# Patient Record
Sex: MALE | Race: WHITE | NOT HISPANIC OR LATINO | Employment: FULL TIME | URBAN - NONMETROPOLITAN AREA
[De-identification: names, ages, dates, MRNs, and addresses within clinical notes are randomized per-mention and may not be internally consistent; named-entity substitution may affect disease eponyms.]

---

## 2017-05-12 ENCOUNTER — OFFICE VISIT (OUTPATIENT)
Dept: FAMILY MEDICINE | Facility: OTHER | Age: 52
End: 2017-05-12
Attending: FAMILY MEDICINE
Payer: COMMERCIAL

## 2017-05-12 VITALS
BODY MASS INDEX: 33.37 KG/M2 | HEART RATE: 64 BPM | WEIGHT: 260 LBS | SYSTOLIC BLOOD PRESSURE: 103 MMHG | TEMPERATURE: 97.1 F | DIASTOLIC BLOOD PRESSURE: 70 MMHG | HEIGHT: 74 IN

## 2017-05-12 DIAGNOSIS — R97.20 ELEVATED PROSTATE SPECIFIC ANTIGEN (PSA): ICD-10-CM

## 2017-05-12 DIAGNOSIS — E78.1 HYPERTRIGLYCERIDEMIA: ICD-10-CM

## 2017-05-12 DIAGNOSIS — Z12.11 SPECIAL SCREENING FOR MALIGNANT NEOPLASMS, COLON: ICD-10-CM

## 2017-05-12 DIAGNOSIS — Z00.00 ROUTINE GENERAL MEDICAL EXAMINATION AT A HEALTH CARE FACILITY: Primary | ICD-10-CM

## 2017-05-12 DIAGNOSIS — K42.9 UMBILICAL HERNIA WITHOUT OBSTRUCTION AND WITHOUT GANGRENE: ICD-10-CM

## 2017-05-12 DIAGNOSIS — Z12.5 SCREENING FOR PROSTATE CANCER: ICD-10-CM

## 2017-05-12 DIAGNOSIS — I83.93 VARICOSE VEINS OF BOTH LOWER EXTREMITIES: ICD-10-CM

## 2017-05-12 LAB
ALBUMIN SERPL-MCNC: 4 G/DL (ref 3.4–5)
ALP SERPL-CCNC: 74 U/L (ref 40–150)
ALT SERPL W P-5'-P-CCNC: 30 U/L (ref 0–70)
ANION GAP SERPL CALCULATED.3IONS-SCNC: 6 MMOL/L (ref 3–14)
AST SERPL W P-5'-P-CCNC: 22 U/L (ref 0–45)
BASOPHILS # BLD AUTO: 0 10E9/L (ref 0–0.2)
BASOPHILS NFR BLD AUTO: 0.4 %
BILIRUB SERPL-MCNC: 0.8 MG/DL (ref 0.2–1.3)
BUN SERPL-MCNC: 12 MG/DL (ref 7–30)
CALCIUM SERPL-MCNC: 8.3 MG/DL (ref 8.5–10.1)
CHLORIDE SERPL-SCNC: 108 MMOL/L (ref 94–109)
CHOLEST SERPL-MCNC: 156 MG/DL
CO2 SERPL-SCNC: 29 MMOL/L (ref 20–32)
CREAT SERPL-MCNC: 0.98 MG/DL (ref 0.66–1.25)
DIFFERENTIAL METHOD BLD: NORMAL
EOSINOPHIL # BLD AUTO: 0.3 10E9/L (ref 0–0.7)
EOSINOPHIL NFR BLD AUTO: 4.8 %
ERYTHROCYTE [DISTWIDTH] IN BLOOD BY AUTOMATED COUNT: 12.7 % (ref 10–15)
GFR SERPL CREATININE-BSD FRML MDRD: 81 ML/MIN/1.7M2
GLUCOSE SERPL-MCNC: 91 MG/DL (ref 70–99)
HCT VFR BLD AUTO: 43.9 % (ref 40–53)
HDLC SERPL-MCNC: 36 MG/DL
HGB BLD-MCNC: 15.5 G/DL (ref 13.3–17.7)
IMM GRANULOCYTES # BLD: 0 10E9/L (ref 0–0.4)
IMM GRANULOCYTES NFR BLD: 0.4 %
LDLC SERPL CALC-MCNC: 92 MG/DL
LYMPHOCYTES # BLD AUTO: 1.5 10E9/L (ref 0.8–5.3)
LYMPHOCYTES NFR BLD AUTO: 29.2 %
MCH RBC QN AUTO: 30.4 PG (ref 26.5–33)
MCHC RBC AUTO-ENTMCNC: 35.3 G/DL (ref 31.5–36.5)
MCV RBC AUTO: 86 FL (ref 78–100)
MONOCYTES # BLD AUTO: 0.6 10E9/L (ref 0–1.3)
MONOCYTES NFR BLD AUTO: 11.3 %
NEUTROPHILS # BLD AUTO: 2.8 10E9/L (ref 1.6–8.3)
NEUTROPHILS NFR BLD AUTO: 53.9 %
NONHDLC SERPL-MCNC: 120 MG/DL
NRBC # BLD AUTO: 0 10*3/UL
NRBC BLD AUTO-RTO: 0 /100
PLATELET # BLD AUTO: 183 10E9/L (ref 150–450)
POTASSIUM SERPL-SCNC: 4 MMOL/L (ref 3.4–5.3)
PROT SERPL-MCNC: 7.1 G/DL (ref 6.8–8.8)
PSA SERPL-ACNC: 5.08 UG/L (ref 0–4)
RBC # BLD AUTO: 5.1 10E12/L (ref 4.4–5.9)
SODIUM SERPL-SCNC: 143 MMOL/L (ref 133–144)
TRIGL SERPL-MCNC: 140 MG/DL
WBC # BLD AUTO: 5.2 10E9/L (ref 4–11)

## 2017-05-12 PROCEDURE — 99396 PREV VISIT EST AGE 40-64: CPT | Performed by: FAMILY MEDICINE

## 2017-05-12 PROCEDURE — 85025 COMPLETE CBC W/AUTO DIFF WBC: CPT | Performed by: FAMILY MEDICINE

## 2017-05-12 PROCEDURE — 80061 LIPID PANEL: CPT | Performed by: FAMILY MEDICINE

## 2017-05-12 PROCEDURE — 93000 ELECTROCARDIOGRAM COMPLETE: CPT | Performed by: INTERNAL MEDICINE

## 2017-05-12 PROCEDURE — 84153 ASSAY OF PSA TOTAL: CPT | Performed by: FAMILY MEDICINE

## 2017-05-12 PROCEDURE — 80053 COMPREHEN METABOLIC PANEL: CPT | Performed by: FAMILY MEDICINE

## 2017-05-12 PROCEDURE — 36415 COLL VENOUS BLD VENIPUNCTURE: CPT | Performed by: FAMILY MEDICINE

## 2017-05-12 ASSESSMENT — ANXIETY QUESTIONNAIRES
2. NOT BEING ABLE TO STOP OR CONTROL WORRYING: MORE THAN HALF THE DAYS
5. BEING SO RESTLESS THAT IT IS HARD TO SIT STILL: NOT AT ALL
IF YOU CHECKED OFF ANY PROBLEMS ON THIS QUESTIONNAIRE, HOW DIFFICULT HAVE THESE PROBLEMS MADE IT FOR YOU TO DO YOUR WORK, TAKE CARE OF THINGS AT HOME, OR GET ALONG WITH OTHER PEOPLE: SOMEWHAT DIFFICULT
GAD7 TOTAL SCORE: 8
7. FEELING AFRAID AS IF SOMETHING AWFUL MIGHT HAPPEN: SEVERAL DAYS
3. WORRYING TOO MUCH ABOUT DIFFERENT THINGS: MORE THAN HALF THE DAYS
6. BECOMING EASILY ANNOYED OR IRRITABLE: SEVERAL DAYS
1. FEELING NERVOUS, ANXIOUS, OR ON EDGE: SEVERAL DAYS

## 2017-05-12 ASSESSMENT — PAIN SCALES - GENERAL: PAINLEVEL: NO PAIN (0)

## 2017-05-12 ASSESSMENT — PATIENT HEALTH QUESTIONNAIRE - PHQ9: 5. POOR APPETITE OR OVEREATING: SEVERAL DAYS

## 2017-05-12 NOTE — LETTER
May 12, 2017      Rajendra SWAPNA Gagnon  [address]   [place] [address]            To Whom this may concern,    Rajendra had a clinic appointment 5/12/17.       Sincerely,    Dr. Mckeon

## 2017-05-12 NOTE — PATIENT INSTRUCTIONS
Preventive Health Recommendations  Male Ages 50   64    Yearly exam:             See your health care provider every year in order to  o   Review health changes.   o   Discuss preventive care.    o   Review your medicines if your doctor has prescribed any.     Have a cholesterol test every 5 years, or more frequently if you are at risk for high cholesterol/heart disease.     Have a diabetes test (fasting glucose) every three years. If you are at risk for diabetes, you should have this test more often.     Have a colonoscopy at age 50, or have a yearly FIT test (stool test). These exams will check for colon cancer.      Talk with your health care provider about whether or not a prostate cancer screening test (PSA) is right for you.    You should be tested each year for STDs (sexually transmitted diseases), if you re at risk.     Shots: Get a flu shot each year. Get a tetanus shot every 10 years.     Nutrition:    Eat at least 5 servings of fruits and vegetables daily.     Eat whole-grain bread, whole-wheat pasta and brown rice instead of white grains and rice.     Talk to your provider about Calcium and Vitamin D.     Lifestyle    Exercise for at least 150 minutes a week (30 minutes a day, 5 days a week). This will help you control your weight and prevent disease.     Limit alcohol to one drink per day.     No smoking.     Wear sunscreen to prevent skin cancer.     See your dentist every six months for an exam and cleaning.     See your eye doctor every 1 to 2 years.     Ref Range & Units 7:54 AM   1yr ago   2yr ago   3yr ago         Sodium 133 - 144 mmol/L 143 139 138 133 (L)R      Potassium 3.4 - 5.3 mmol/L 4.0 3.8 4.1 4.0R      Chloride 94 - 109 mmol/L 108 105 107 101R      Carbon Dioxide 20 - 32 mmol/L 29 29 24 28R      Anion Gap 3 - 14 mmol/L 6 5 7R 4.2 (L)R      Glucose 70 - 99 mg/dL 91 85 86CM 101 (H)R     Comments: Fasting specimen      Urea Nitrogen 7 - 30 mg/dL 12 18 13CM 15R      Creatinine 0.66 - 1.25  mg/dL 0.98 1.02 0.88 1.34 (H)R      GFR Estimate >60 mL/min/1.7m2 81 78CM >90  Non  Amer... 57 (L)     Comments: Non  GFR Calc      GFR Estimate If Black >60 mL/min/1.7m2 >90  ... >90  ... >90  ... 69     >90    GFR Calc         Calcium 8.5 - 10.1 mg/dL 8.3 (L) 8.3 (L) 8.6CM 8.5R      Bilirubin Total 0.2 - 1.3 mg/dL 0.8 0.9 1.0       Albumin 3.4 - 5.0 g/dL 4.0 3.9 4.3R       Protein Total 6.8 - 8.8 g/dL 7.1 7.2 7.4       Alkaline Phosphatase 40 - 150 U/L 74 67 67       ALT 0 - 70 U/L 30 35 33       AST 0 - 45 U/L 22 26 26      Resulting [facility]          Specimen Collected: 05/12/17  7:54 AM Last Resulted: 05/12/17  8:21 AM                CM=Additional comments  R=Reference range differs from displayed range                     Prostate spec antigen screen   Status:  Final result   Visible to patient:  No (Not Released) Dx:  Screening for prostate cancer Order: [id number]             Ref Range & Units 7:54 AM   3yr ago        PSA 0 - 4 ug/L 5.08 (H) 3.50     Comments: Assay Method:  Chemiluminescence using Siemens Vista analyzer     Resulting [facility]          Specimen Collected: 05/12/17  7:54 AM Last Resulted: 05/12/17  8:21 AM                                Lipid Profile   Status:  Final result   Visible to patient:  No (Not Released) Dx:  Hypertriglyceridemia Order: [id number]             Ref Range & Units 7:54 AM   1yr ago   2yr ago   3yr ago        Cholesterol <200 mg/dL 156 176 160CM 173R, CM      Triglycerides <150 mg/dL 140 233 (H) (H)R 166 (H)R     Comments: Fasting specimen      HDL Cholesterol >39 mg/dL 36 (L) 34 (L) 35 (L)R 29 (L)R      LDL Cholesterol Calculated <100 mg/dL 92 95CM 94R, CM 111R, CM     Comments: Desirable:       <100 mg/dl      Non HDL Cholesterol <130 mg/dL 120 142 (H)CM       Resulting [facility]          Specimen Collected: 05/12/17  7:54 AM Last Resulted: 05/12/17   8:21 AM                CM=Additional comments  R=Reference range differs from displayed range                      CBC with platelets differential   Status:  Final result   Visible to patient:  No (Not Released) Dx:  Special screening for malignant neopl... Order: [id number]             Ref Range & Units 7:54 AM  (5/12/17) 1yr ago  (5/11/16) 2yr ago  (8/8/14) 3yr ago  (2/10/14) 3yr ago  (8/22/13)      WBC 4.0 - 11.0 10e9/L 5.2 6.5 6.0 8.0 6.1      RBC Count 4.4 - 5.9 10e12/L 5.10 5.10 5.02 5.50 4.98      Hemoglobin 13.3 - 17.7 g/dL 15.5 15.5 15.3 16.6 15.1      Hematocrit 40.0 - 53.0 % 43.9 44.8 43.5 47.2 43.2      MCV 78 - 100 fl 86 88 87 86 87      MCH 26.5 - 33.0 pg 30.4 30.4 30.5 30.2 30.3      MCHC 31.5 - 36.5 g/dL 35.3 34.6 35.2 35.2 35.0      RDW 10.0 - 15.0 % 12.7 13.0 13.7 13.4 13.4      Platelet Count 150 - 450 10e9/L 183 172 165 196 157      Diff Method  Automated Method Automated Method Automated Method Automated Method Automated Method      % Neutrophils % 53.9 54.9 59.1 61.3 68.4      % Lymphocytes % 29.2 31.2 29.0 27.7 21.2      % Monocytes % 11.3 9.0 8.7 7.4 8.5      % Eosinophils % 4.8 4.0 2.7 2.9 1.3      % Basophils % 0.4 0.6 0.3 0.4 0.3      % Immature Granulocytes % 0.4 0.3 0.2 0.3 0.3      Nucleated RBCs 0 /100 0 0         Absolute Neutrophil 1.6 - 8.3 10e9/L 2.8 3.6 3.5 4.9 4.2      Absolute Lymphocytes 0.8 - 5.3 10e9/L 1.5 2.0 1.7 2.2 1.3      Absolute Monocytes 0.0 - 1.3 10e9/L 0.6 0.6 0.5 0.6 0.5      Absolute Eosinophils 0.0 - 0.7 10e9/L 0.3 0.3 0.2 0.2 0.1      Absolute Basophils 0.0 - 0.2 10e9/L 0.0 0.0 0.0 0.0 0.0      Abs Immature Granulocytes 0 - 0.4 10e9/L 0.0 0.0 0.0 0.0 0.0      Absolute Nucleated RBC  0.0 0.0        Resulting Agency  [facility] HI          Specimen Collected: 05/12/17  7:54 AM Last Resulted: 05/12/17  8:01 AM                                 Status of Other Orders        Order    Lab Status Result Date Provider Status       EKG 12-lead complete w/read - Clinics No  Result  Ordered             Colorectal Cancer Screening  Colorectal cancer (cancer in the colon or rectum) is a leading cause of cancer deaths in the [place]. But it doesn t have to be. When this cancer is found and removed early, the chances of a full recovery are very good. Because colorectal cancer rarely causes symptoms in its early stages, screening for the disease is important. It s even more crucial if you have risk factors for the disease. Learn more about colorectal cancer and its risk factors. Then talk to your healthcare provider about being screened. You could be saving your own life.  Risk factors for colorectal cancer  Your risk of having colorectal cancer increases if you:    Are 50 years of age or older    Have a family history or personal history of colorectal cancer or polyps    Have a personal history of type 2 diabetes, Crohn s disease, or ulcerative colitis    Have an inherited genetic syndrome like Starr syndrome (also known as HNPCC) or familial adenomatous polyposis (FAP)    Are very overweight    Are not physically active    Smoke    Drink a lot of alcohol    Eat a lot of red or processed meat  The colon and rectum        A ESTIVEN can detect a growth in the rectum or anus.      Waste from food you eat enters the colon from the small intestine. As it travels through the colon, the waste (stool) loses water and becomes more solid. Intestinal muscles push it toward the sigmoid the last section of the colon. Stool then moves into the rectum, where it s stored until it s ready to leave the body during a bowel movement.  How cancer develops  Polyps are growths that form on the inner lining of the colon or rectum. Most are benign, which means they aren t cancerous. But over time, some polyps can become malignant (cancerous). This happens when cells in these polyps begin growing abnormally. In time, malignant cells invade more and more of the colon and rectum. The cancer may also spread to nearby  organs or lymph nodes or to other parts of the body. Finding and removing polyps can help prevent cancer from ever forming.  Your screening  Screening means looking for a medical problem before you have symptoms. During screening for colorectal cancer, your healthcare provider will ask about your medical history, examine you, and do one or more tests.  History and exam  The history and exam involve the following:    Medical history. Your healthcare provider will ask about your medical history. Mention if a family member has had colon cancer or polyps. Also mention any health problems you have had in the past.    Digital rectal exam (ESTIVEN). During a ESTIVEN, the healthcare provider inserts a lubricated gloved finger into the rectum. The test is painless and takes less than a minute. Healthcare providers agree that this test alone is not enough to screen for colorectal cancer.  Screening test choices  Fecal occult blood test (FOBT) or fecal immunochemical test (FIT)  These tests check for occult blood in stool (blood you can t see). Hidden blood may be a sign of colon polyps or cancer. A small sample of stool is tested for blood in a laboratory. Most often, you collect this sample at home using a kit your healthcare provider gives you. Follow the instructions carefully for using this kit. You might need to avoid certain foods and medicines before the test, as directed.  Barium enema with contrast (double-contrast barium enema)  This test uses X-rays to provide images of the entire colon and rectum. The day before this test, you will need to do a bowel prep to clean out the colon and rectum. A bowel prep is a liquid diet plus strong laxatives or enemas. You will be awake for the test, but you may be given medicine to help you relax. At the start of the test, a radiologist (a healthcare provider who specializes in imaging tests) places a soft tube into the rectum. The tube is used to fill the colon with a contrast liquid  (barium) and air. This can be uncomfortable for some people. The liquid helps the colon show up clearly on the X-rays. Because the test uses X-rays, it exposes you to a small amount of radiation.  Virtual colonoscopy  This exam is also called a CT colonography. It uses a series of X-ray photographs to create a 3-D view of the colon and rectum. The day before the test, you will need to do a bowel prep to clean out your colon. Your healthcare provider will give you instructions on how to do this. During the procedure, you will lie on a table that is part of a special X-ray machine called a CT scanner. A small tube will be placed into your rectum to fill the colon and rectum with air. This can be uncomfortable for some people. Then, the table will move into the machine and pictures will be taken of your colon and rectum. A computer will combine these photos to create a 3-D picture. Because the test uses X-rays, it exposes you to a small amount of radiation.        Scope exams  Here are two types of scope exams:    Colonoscopy. This test can be used to find and remove polyps anywhere in the colon or rectum. The day before the test, you will do a bowel prep. This is a liquid diet plus a strong laxative solution or an enema. The bowel prep will cleanse your colon. You will be given instructions for this. Just before the test, you are given a medicine to make you sleepy. Then, a long, flexible, lighted tube called a colonoscope is gently inserted into the rectum and guided through the entire colon. Images of the colon are viewed on a video screen. Any polyps that are found are removed and sent to a lab for testing. If a polyp can t be removed, a sample of tissue is taken and the polyp might be removed later during surgery. You will need to bring someone with you to drive you home after this test.     Sigmoidoscopy. This test is similar to colonoscopy, but focuses only on the sigmoid colon and rectum. As with colonoscopy,  bowel prep must be done the day before this test. It might not need to be as complete as the bowel prep for a colonoscopy. You are awake during the procedure, but you may be given medicine to help you relax. During the test, the healthcare provider guides a thin, flexible, lighted tube called a sigmoidoscope through your rectum and lower colon. The images are displayed on a video screen. Polyps are removed, if possible, and sent to a lab for testing.  Colonoscopy is the only screening test that lets your healthcare provider see the entire colon and rectum. This test also lets your healthcare provider remove any pieces of tissue that need to be looked at by a lab. If something suspicious is found using any other tests, you will likely need a colonoscopy.  When to call your healthcare provider after a test  Call your healthcare provider if you have any of the following after any screening test:    Bleeding    Fever over 101 F (38.8 C)    Abdominal pain    Vomiting     9419-6751 [employer]. [address] [place] [address]. All rights reserved. This information is not intended as a substitute for professional medical care. Always follow your healthcare professional's instructions.        Having Hernia Surgery: Patch Repair  Surgery treats a hernia by repairing the weakness in the abdominal wall. An incision is made so the surgeon has a direct view of the hernia. The repair is then done through this incision (open surgery). To repair the defect, special mesh materials are used to patch the weak area and make a  tension-free repair.  Follow your doctor s advice on how to get ready for the procedure. You can usually go home the same day as your surgery. In some cases, though, you may need to stay in the hospital overnight.  Getting Ready for Surgery  Your doctor will talk with you about preparing for surgery. Follow all the instructions you re given and be sure to:     Tell your doctor about any  medications, supplements, or herbs you take. This includes both prescription and over-the-counter items.    Stop taking aspirin, ibuprofen, naproxen and other NSAIDs as directed.    Arrange for an adult family member or friend to give you a ride home after surgery.    Stop smoking. Smoking affects blood flow and can slow healing.    Gently wash the surgical area the night before surgery.    Don t eat or drink after midnight, the night before your surgery. You may need to take some medication with sips of water on the day of surgery. Talk with your doctor.     Repair in Front           Repair in Back           Combination Repair      The Day of Surgery  Arrive at the hospital or surgical center at your scheduled time. You ll be asked to change into a patient gown. You ll then be given an IV to provide fluids and medication. Shortly before surgery, an anesthesiologist will talk with you. He or she will explain the types of anesthesia used to prevent pain during surgery. You will have one or more of the following:    Monitored sedation to make you relaxed and sleepy.    Local anesthesia to numb the surgical site.    Regional anesthesia to numb specific areas of your body.    General anesthesia to let you sleep during surgery.  During the Surgery  Most hernias are treated using  tension-free  repairs. This is surgery that uses special mesh materials to repair the weak area. Unlike traditional repairs, the abdominal fascia (tissue around the muscle that gives strength to the abdominal wall) isn t sutured together. Instead, the mesh covers the weak area like a patch. This repairs the defect without  tension  on the muscles. It also makes it less likely to happen again. The mesh is made of strong, flexible plastic that stays in the body. Over time, nearby tissues grow into the mesh to strengthen the repair.  After Surgery  When the procedure is over, you ll be taken to the recovery area to rest. Your blood pressure and  heart rate will be monitored. You ll also have a bandage over the surgical site. To help reduce discomfort, you ll be given pain medications. You may also be given breathing exercises to keep your lungs clear. Later, you ll be asked to get up and walk. This helps prevent blood clots in the legs. You can go home when your doctor says you re ready.     Risks and Complications  Hernia surgery is safe, but does have risks including:    Bleeding    Infection    Anesthesia risks    Mesh complications    Inability to urinate     Bowel or bladder injury     Numbness or pain in the groin or leg    Risk the hernia will recur    Damage to the testicles or testicular function        6559-6191 [employer]. [address] [place] [address], [place] [address]. All rights reserved. This information is not intended as a substitute for professional medical care. Always follow your healthcare professional's instructions.        PSA Test  The prostate specific antigen (PSA) test is a blood test. It can help find prostate cancer. PSA is a substance in semen. It s made by the prostate. It is normal for some PSA to leak from the prostate into the bloodstream. But sometimes, more than a normal amount of PSA gets into the blood. The PSA test measures the amount of PSA in the blood. If the test shows high blood level of PSA, other tests are needed to help find the cause.     PSA is a simple blood test.   Possible causes of increased PSA  Several things can cause extra PSA to enter the blood, such as:    Prostate cancer    Prostate infection (prostatitis)    Enlarged prostate not due to cancer (benign prostatic hyperplasia, or BPH)    Prostate massage    Prostate biopsy  Why a PSA test is done  A PSA test can be done to check for prostate cancer. But the PSA test by itself can t tell for sure whether or not a man has prostate cancer. And not all health care providers agree if men should have PSA tests to screen for prostate cancer. The  American [employer] Society and many other organizations advise men talk with their health care provider about if prostate cancer screening is right for them. Talk with your health care provider about the PSA test beginning around age 50, or earlier if you re at higher risk.  A PSA test may also be done if a problem is found during a routine prostate exam. And it may be done if you have symptoms that suggest that you have a prostate problem. You may need a PSA if you have symptoms such as:    Needing to urinate more often    Waking often to urinate at night    Having to strain when urinating    Seeing blood in your urine    Having pain when urinating  How a PSA test is done  Before a PSA test, you may have a digital rectal exam (ESTIVEN) of the prostate. For this exam, the health care provider inserts a lubricated, gloved finger into the rectum to feel the prostate. Then you ll be sent to have your blood drawn for the PSA test. The test may be done in the health care provider s office. Or it may be at a lab, clinic, or hospital. Blood is taken from your arm and sent to a lab to be tested.  Getting your results  The time it takes to get your test results varies. Ask your health care provider when you can expect your results. You and your health care provider will discuss the results. A normal range for your PSA depends on a number of factors. These include your age and the size of your prostate. They also include your risk factors for cancer, your symptoms, and the results of any previous PSA tests you ve had. All of these things are taken into account when your PSA tests numbers are assessed.  If you're at higher risk for prostate cancer  If you are -American or have a family history of prostate cancer, talk with your health care provider about PSA tests by age 40 to 45.     2430-3740 [facility]. [address] [place] [address], [place] [address]. All rights reserved. This information is not intended as a  substitute for professional medical care. Always follow your healthcare professional's instructions.

## 2017-05-12 NOTE — MR AVS SNAPSHOT
After Visit Summary   5/12/2017    Rajendra Gagnon    MRN: [id number]           Patient Information     Date Of Birth          1965        Visit Information        Provider Department      5/12/2017 8:00 AM Bhupinder Mckeon MD [facility]        Today's Diagnoses     Routine general medical examination at a health care facility    -  1    Hypertriglyceridemia        Screening for prostate cancer        Special screening for malignant neoplasms, colon        Elevated prostate specific antigen (PSA)        Varicose veins of both lower extremities        Umbilical hernia without obstruction and without gangrene          Care Instructions      Preventive Health Recommendations  Male Ages 50 - 64    Yearly exam:             See your health care provider every year in order to  o   Review health changes.   o   Discuss preventive care.    o   Review your medicines if your doctor has prescribed any.     Have a cholesterol test every 5 years, or more frequently if you are at risk for high cholesterol/heart disease.     Have a diabetes test (fasting glucose) every three years. If you are at risk for diabetes, you should have this test more often.     Have a colonoscopy at age 50, or have a yearly FIT test (stool test). These exams will check for colon cancer.      Talk with your health care provider about whether or not a prostate cancer screening test (PSA) is right for you.    You should be tested each year for STDs (sexually transmitted diseases), if you re at risk.     Shots: Get a flu shot each year. Get a tetanus shot every 10 years.     Nutrition:    Eat at least 5 servings of fruits and vegetables daily.     Eat whole-grain bread, whole-wheat pasta and brown rice instead of white grains and rice.     Talk to your provider about Calcium and Vitamin D.     Lifestyle    Exercise for at least 150 minutes a week (30 minutes a day, 5 days a week). This will help you control your weight and  prevent disease.     Limit alcohol to one drink per day.     No smoking.     Wear sunscreen to prevent skin cancer.     See your dentist every six months for an exam and cleaning.     See your eye doctor every 1 to 2 years.     Ref Range & Units 7:54 AM   1yr ago   2yr ago   3yr ago         Sodium 133 - 144 mmol/L 143 139 138 133 (L)R      Potassium 3.4 - 5.3 mmol/L 4.0 3.8 4.1 4.0R      Chloride 94 - 109 mmol/L 108 105 107 101R      Carbon Dioxide 20 - 32 mmol/L 29 29 24 28R      Anion Gap 3 - 14 mmol/L 6 5 7R 4.2 (L)R      Glucose 70 - 99 mg/dL 91 85 86CM 101 (H)R     Comments: Fasting specimen      Urea Nitrogen 7 - 30 mg/dL 12 18 13CM 15R      Creatinine 0.66 - 1.25 mg/dL 0.98 1.02 0.88 1.34 (H)R      GFR Estimate >60 mL/min/1.7m2 81 78CM >90  Non  Amer... 57 (L)     Comments: Non  GFR Calc      GFR Estimate If Black >60 mL/min/1.7m2 >90  ... >90  ... >90  ... 69     >90    GFR Calc         Calcium 8.5 - 10.1 mg/dL 8.3 (L) 8.3 (L) 8.6CM 8.5R      Bilirubin Total 0.2 - 1.3 mg/dL 0.8 0.9 1.0       Albumin 3.4 - 5.0 g/dL 4.0 3.9 4.3R       Protein Total 6.8 - 8.8 g/dL 7.1 7.2 7.4       Alkaline Phosphatase 40 - 150 U/L 74 67 67       ALT 0 - 70 U/L 30 35 33       AST 0 - 45 U/L 22 26 26      Resulting Agency  [facility]          Specimen Collected: 05/12/17  7:54 AM Last Resulted: 05/12/17  8:21 AM                CM=Additional comments  R=Reference range differs from displayed range                     Prostate spec antigen screen   Status:  Final result   Visible to patient:  No (Not Released) Dx:  Screening for prostate cancer Order: [id number]             Ref Range & Units 7:54 AM   3yr ago        PSA 0 - 4 ug/L 5.08 (H) 3.50     Comments: Assay Method:  Chemiluminescence using Siemens Vista analyzer     Resulting Agency  [facility]          Specimen Collected: 05/12/17  7:54 AM Last Resulted: 05/12/17  8:21 AM                                 Lipid Profile   Status:  Final result   Visible to patient:  No (Not Released) Dx:  Hypertriglyceridemia Order: [id number]             Ref Range & Units 7:54 AM   1yr ago   2yr ago   3yr ago        Cholesterol <200 mg/dL 156 176 160CM 173R, CM      Triglycerides <150 mg/dL 140 233 (H) (H)R 166 (H)R     Comments: Fasting specimen      HDL Cholesterol >39 mg/dL 36 (L) 34 (L) 35 (L)R 29 (L)R      LDL Cholesterol Calculated <100 mg/dL 92 95CM 94R, CM 111R, CM     Comments: Desirable:       <100 mg/dl      Non HDL Cholesterol <130 mg/dL 120 142 (H)CM       Resulting Agency  [facility] HI HI HI          Specimen Collected: 05/12/17  7:54 AM Last Resulted: 05/12/17  8:21 AM                CM=Additional comments  R=Reference range differs from displayed range                      CBC with platelets differential   Status:  Final result   Visible to patient:  No (Not Released) Dx:  Special screening for malignant neopl... Order: [id number]             Ref Range & Units 7:54 AM  (5/12/17) 1yr ago  (5/11/16) 2yr ago  (8/8/14) 3yr ago  (2/10/14) 3yr ago  (8/22/13)      WBC 4.0 - 11.0 10e9/L 5.2 6.5 6.0 8.0 6.1      RBC Count 4.4 - 5.9 10e12/L 5.10 5.10 5.02 5.50 4.98      Hemoglobin 13.3 - 17.7 g/dL 15.5 15.5 15.3 16.6 15.1      Hematocrit 40.0 - 53.0 % 43.9 44.8 43.5 47.2 43.2      MCV 78 - 100 fl 86 88 87 86 87      MCH 26.5 - 33.0 pg 30.4 30.4 30.5 30.2 30.3      MCHC 31.5 - 36.5 g/dL 35.3 34.6 35.2 35.2 35.0      RDW 10.0 - 15.0 % 12.7 13.0 13.7 13.4 13.4      Platelet Count 150 - 450 10e9/L 183 172 165 196 157      Diff Method  Automated Method Automated Method Automated Method Automated Method Automated Method      % Neutrophils % 53.9 54.9 59.1 61.3 68.4      % Lymphocytes % 29.2 31.2 29.0 27.7 21.2      % Monocytes % 11.3 9.0 8.7 7.4 8.5      % Eosinophils % 4.8 4.0 2.7 2.9 1.3      % Basophils % 0.4 0.6 0.3 0.4 0.3      % Immature Granulocytes % 0.4 0.3 0.2 0.3 0.3      Nucleated RBCs 0 /100 0 0         Absolute  Neutrophil 1.6 - 8.3 10e9/L 2.8 3.6 3.5 4.9 4.2      Absolute Lymphocytes 0.8 - 5.3 10e9/L 1.5 2.0 1.7 2.2 1.3      Absolute Monocytes 0.0 - 1.3 10e9/L 0.6 0.6 0.5 0.6 0.5      Absolute Eosinophils 0.0 - 0.7 10e9/L 0.3 0.3 0.2 0.2 0.1      Absolute Basophils 0.0 - 0.2 10e9/L 0.0 0.0 0.0 0.0 0.0      Abs Immature Granulocytes 0 - 0.4 10e9/L 0.0 0.0 0.0 0.0 0.0      Absolute Nucleated RBC  0.0 0.0        Resulting Agency  [facility] HI HI HI          Specimen Collected: 05/12/17  7:54 AM Last Resulted: 05/12/17  8:01 AM                                 Status of Other Orders        Order    Lab Status Result Date Provider Status       EKG 12-lead complete w/read - Clinics No Result  Ordered             Colorectal Cancer Screening  Colorectal cancer (cancer in the colon or rectum) is a leading cause of cancer deaths in the [place]. But it doesn t have to be. When this cancer is found and removed early, the chances of a full recovery are very good. Because colorectal cancer rarely causes symptoms in its early stages, screening for the disease is important. It s even more crucial if you have risk factors for the disease. Learn more about colorectal cancer and its risk factors. Then talk to your healthcare provider about being screened. You could be saving your own life.  Risk factors for colorectal cancer  Your risk of having colorectal cancer increases if you:    Are 50 years of age or older    Have a family history or personal history of colorectal cancer or polyps    Have a personal history of type 2 diabetes, Crohn s disease, or ulcerative colitis    Have an inherited genetic syndrome like Starr syndrome (also known as HNPCC) or familial adenomatous polyposis (FAP)    Are very overweight    Are not physically active    Smoke    Drink a lot of alcohol    Eat a lot of red or processed meat  The colon and rectum        A ESTIVEN can detect a growth in the rectum or anus.      Waste from food you eat enters the colon from  the small intestine. As it travels through the colon, the waste (stool) loses water and becomes more solid. Intestinal muscles push it toward the sigmoid--the last section of the colon. Stool then moves into the rectum, where it s stored until it s ready to leave the body during a bowel movement.  How cancer develops  Polyps are growths that form on the inner lining of the colon or rectum. Most are benign, which means they aren t cancerous. But over time, some polyps can become malignant (cancerous). This happens when cells in these polyps begin growing abnormally. In time, malignant cells invade more and more of the colon and rectum. The cancer may also spread to nearby organs or lymph nodes or to other parts of the body. Finding and removing polyps can help prevent cancer from ever forming.  Your screening  Screening means looking for a medical problem before you have symptoms. During screening for colorectal cancer, your healthcare provider will ask about your medical history, examine you, and do one or more tests.  History and exam  The history and exam involve the following:    Medical history. Your healthcare provider will ask about your medical history. Mention if a family member has had colon cancer or polyps. Also mention any health problems you have had in the past.    Digital rectal exam (ESTIVEN). During a ESTIVEN, the healthcare provider inserts a lubricated gloved finger into the rectum. The test is painless and takes less than a minute. Healthcare providers agree that this test alone is not enough to screen for colorectal cancer.  Screening test choices  Fecal occult blood test (FOBT) or fecal immunochemical test (FIT)  These tests check for occult blood in stool (blood you can t see). Hidden blood may be a sign of colon polyps or cancer. A small sample of stool is tested for blood in a laboratory. Most often, you collect this sample at home using a kit your healthcare provider gives you. Follow the  instructions carefully for using this kit. You might need to avoid certain foods and medicines before the test, as directed.  Barium enema with contrast (double-contrast barium enema)  This test uses X-rays to provide images of the entire colon and rectum. The day before this test, you will need to do a bowel prep to clean out the colon and rectum. A bowel prep is a liquid diet plus strong laxatives or enemas. You will be awake for the test, but you may be given medicine to help you relax. At the start of the test, a radiologist (a healthcare provider who specializes in imaging tests) places a soft tube into the rectum. The tube is used to fill the colon with a contrast liquid (barium) and air. This can be uncomfortable for some people. The liquid helps the colon show up clearly on the X-rays. Because the test uses X-rays, it exposes you to a small amount of radiation.  Virtual colonoscopy  This exam is also called a CT colonography. It uses a series of X-ray photographs to create a 3-D view of the colon and rectum. The day before the test, you will need to do a bowel prep to clean out your colon. Your healthcare provider will give you instructions on how to do this. During the procedure, you will lie on a table that is part of a special X-ray machine called a CT scanner. A small tube will be placed into your rectum to fill the colon and rectum with air. This can be uncomfortable for some people. Then, the table will move into the machine and pictures will be taken of your colon and rectum. A computer will combine these photos to create a 3-D picture. Because the test uses X-rays, it exposes you to a small amount of radiation.        Scope exams  Here are two types of scope exams:    Colonoscopy. This test can be used to find and remove polyps anywhere in the colon or rectum. The day before the test, you will do a bowel prep. This is a liquid diet plus a strong laxative solution or an enema. The bowel prep  will cleanse your colon. You will be given instructions for this. Just before the test, you are given a medicine to make you sleepy. Then, a long, flexible, lighted tube called a colonoscope is gently inserted into the rectum and guided through the entire colon. Images of the colon are viewed on a video screen. Any polyps that are found are removed and sent to a lab for testing. If a polyp can t be removed, a sample of tissue is taken and the polyp might be removed later during surgery. You will need to bring someone with you to drive you home after this test.     Sigmoidoscopy. This test is similar to colonoscopy, but focuses only on the sigmoid colon and rectum. As with colonoscopy, bowel prep must be done the day before this test. It might not need to be as complete as the bowel prep for a colonoscopy. You are awake during the procedure, but you may be given medicine to help you relax. During the test, the healthcare provider guides a thin, flexible, lighted tube called a sigmoidoscope through your rectum and lower colon. The images are displayed on a video screen. Polyps are removed, if possible, and sent to a lab for testing.  Colonoscopy is the only screening test that lets your healthcare provider see the entire colon and rectum. This test also lets your healthcare provider remove any pieces of tissue that need to be looked at by a lab. If something suspicious is found using any other tests, you will likely need a colonoscopy.  When to call your healthcare provider after a test  Call your healthcare provider if you have any of the following after any screening test:    Bleeding    Fever over 101 F (38.8 C)    Abdominal pain    Vomiting     9580-2589 [employer]. [address] [place] [address], [place] [address]. All rights reserved. This information is not intended as a substitute for professional medical care. Always follow your healthcare professional's instructions.        Having Hernia Surgery:  Patch Repair  Surgery treats a hernia by repairing the weakness in the abdominal wall. An incision is made so the surgeon has a direct view of the hernia. The repair is then done through this incision (open surgery). To repair the defect, special mesh materials are used to patch the weak area and make a  tension-free repair.  Follow your doctor s advice on how to get ready for the procedure. You can usually go home the same day as your surgery. In some cases, though, you may need to stay in the hospital overnight.  Getting Ready for Surgery  Your doctor will talk with you about preparing for surgery. Follow all the instructions you re given and be sure to:     Tell your doctor about any medications, supplements, or herbs you take. This includes both prescription and over-the-counter items.    Stop taking aspirin, ibuprofen, naproxen and other NSAIDs as directed.    Arrange for an adult family member or friend to give you a ride home after surgery.    Stop smoking. Smoking affects blood flow and can slow healing.    Gently wash the surgical area the night before surgery.    Don t eat or drink after midnight, the night before your surgery. You may need to take some medication with sips of water on the day of surgery. Talk with your doctor.     Repair in Front           Repair in Back           Combination Repair      The Day of Surgery  Arrive at the hospital or surgical center at your scheduled time. You ll be asked to change into a patient gown. You ll then be given an IV to provide fluids and medication. Shortly before surgery, an anesthesiologist will talk with you. He or she will explain the types of anesthesia used to prevent pain during surgery. You will have one or more of the following:    Monitored sedation to make you relaxed and sleepy.    Local anesthesia to numb the surgical site.    Regional anesthesia to numb specific areas of your body.    General anesthesia to let you sleep during surgery.  During  the Surgery  Most hernias are treated using  tension-free  repairs. This is surgery that uses special mesh materials to repair the weak area. Unlike traditional repairs, the abdominal fascia (tissue around the muscle that gives strength to the abdominal wall) isn t sutured together. Instead, the mesh covers the weak area like a patch. This repairs the defect without  tension  on the muscles. It also makes it less likely to happen again. The mesh is made of strong, flexible plastic that stays in the body. Over time, nearby tissues grow into the mesh to strengthen the repair.  After Surgery  When the procedure is over, you ll be taken to the recovery area to rest. Your blood pressure and heart rate will be monitored. You ll also have a bandage over the surgical site. To help reduce discomfort, you ll be given pain medications. You may also be given breathing exercises to keep your lungs clear. Later, you ll be asked to get up and walk. This helps prevent blood clots in the legs. You can go home when your doctor says you re ready.     Risks and Complications  Hernia surgery is safe, but does have risks including:    Bleeding    Infection    Anesthesia risks    Mesh complications    Inability to urinate     Bowel or bladder injury     Numbness or pain in the groin or leg    Risk the hernia will recur    Damage to the testicles or testicular function        0098-3204 [employer]. [address] [place] [address], [place] [address]. All rights reserved. This information is not intended as a substitute for professional medical care. Always follow your healthcare professional's instructions.        PSA Test  The prostate specific antigen (PSA) test is a blood test. It can help find prostate cancer. PSA is a substance in semen. It s made by the prostate. It is normal for some PSA to leak from the prostate into the bloodstream. But sometimes, more than a normal amount of PSA gets into the blood. The PSA test measures the  amount of PSA in the blood. If the test shows high blood level of PSA, other tests are needed to help find the cause.     PSA is a simple blood test.   Possible causes of increased PSA  Several things can cause extra PSA to enter the blood, such as:    Prostate cancer    Prostate infection (prostatitis)    Enlarged prostate not due to cancer (benign prostatic hyperplasia, or BPH)    Prostate massage    Prostate biopsy  Why a PSA test is done  A PSA test can be done to check for prostate cancer. But the PSA test by itself can t tell for sure whether or not a man has prostate cancer. And not all health care providers agree if men should have PSA tests to screen for prostate cancer. The American Cancer Society and many other organizations advise men talk with their health care provider about if prostate cancer screening is right for them. Talk with your health care provider about the PSA test beginning around age 50, or earlier if you re at higher risk.  A PSA test may also be done if a problem is found during a routine prostate exam. And it may be done if you have symptoms that suggest that you have a prostate problem. You may need a PSA if you have symptoms such as:    Needing to urinate more often    Waking often to urinate at night    Having to strain when urinating    Seeing blood in your urine    Having pain when urinating  How a PSA test is done  Before a PSA test, you may have a digital rectal exam (ESTIVEN) of the prostate. For this exam, the health care provider inserts a lubricated, gloved finger into the rectum to feel the prostate. Then you ll be sent to have your blood drawn for the PSA test. The test may be done in the health care provider s office. Or it may be at a lab, clinic, or hospital. Blood is taken from your arm and sent to a lab to be tested.  Getting your results  The time it takes to get your test results varies. Ask your health care provider when you can expect your results. You and your health  care provider will discuss the results. A normal range for your PSA depends on a number of factors. These include your age and the size of your prostate. They also include your risk factors for cancer, your symptoms, and the results of any previous PSA tests you ve had. All of these things are taken into account when your PSA tests numbers are assessed.  If you're at higher risk for prostate cancer  If you are -American or have a family history of prostate cancer, talk with your health care provider about PSA tests by age 40 to 45.     2940-8363 [employer]. [address] [place] [address]. All rights reserved. This information is not intended as a substitute for professional medical care. Always follow your healthcare professional's instructions.              Follow-ups after your visit        Additional Services     GENERAL SURG ADULT REFERRAL       Your provider has referred you to: [facility]   Please be aware that coverage of these services is subject to the terms and limitations of your health insurance plan.  Call member services at your health plan with any benefit or coverage questions.      Please bring the following to your appointment:  >>   Any x-rays, CTs or MRIs which have been performed.  Contact the facility where they were done to arrange for  prior to your scheduled appointment.  Any new CT, MRI or other procedures ordered by your specialist must be performed at a [facility] facility or coordinated by your clinic's referral office.    >>   List of current medications   >>   This referral request   >>   Any documents/labs given to you for this referral            UROLOGY ADULT REFERRAL       Your provider has referred you to:[facility].    Please be aware that coverage of these services is subject to the terms and limitations of your health insurance plan.  Call member services at your health plan with any benefit or coverage questions.      Please bring  "the following with you to your appointment:    (1) Any X-Rays, CTs or MRIs which have been performed.  Contact the facility where they were done to arrange for  prior to your scheduled appointment.    (2) List of current medications  (3) This referral request   (4) Any documents/labs given to you for this referral                  Who to contact     If you have questions or need follow up information about today's clinic visit or your schedule please contact [facility] directly at [phone].  Normal or non-critical lab and imaging results will be communicated to you by [employer], letter or phone within 4 business days after the clinic has received the results. If you do not hear from us within 7 days, please contact the clinic through [employer] or phone. If you have a critical or abnormal lab result, we will notify you by phone as soon as possible.  Submit refill requests through [employer] or call your pharmacy and they will forward the refill request to us. Please allow 3 business days for your refill to be completed.          Additional Information About Your Visit        [employer] Information     [employer] gives you secure access to your electronic health record. If you see a primary care provider, you can also send messages to your care team and make appointments. If you have questions, please call your primary care clinic.  If you do not have a primary care provider, please call [phone] and they will assist you.        Care EveryWhere ID     This is your Care EveryWhere ID. This could be used by other organizations to access your [facility] medical records  [id number]        Your Vitals Were     Pulse Temperature Height BMI (Body Mass Index)          64 97.1  F (36.2  C) 6' 2\" (1.88 m) 33.38 kg/m2         Blood Pressure from Last 3 Encounters:   05/12/17 103/70   06/16/16 143/95   05/26/16 112/80    Weight from Last 3 Encounters:   05/12/17 260 lb (117.9 kg)   06/16/16 265 lb (120.2 kg) "   05/26/16 255 lb (115.7 kg)              We Performed the Following     CBC with platelets differential     Comprehensive metabolic panel     EKG 12-lead complete w/read - Clinics     GENERAL SURG ADULT REFERRAL     Lipid Profile     Prostate spec antigen screen     UROLOGY ADULT REFERRAL          Today's Medication Changes          These changes are accurate as of: 5/12/17  9:03 AM.  If you have any questions, ask your nurse or doctor.               Stop taking these medicines if you haven't already. Please contact your care team if you have questions.     IBUPROFEN PO   Stopped by:  Bhupinder Mckeon MD           traMADol 50 MG tablet   Commonly known as:  ULTRAM   Stopped by:  Bhupinder Mckeon MD                    Primary Care Provider Office Phone # Fax #    Bhupinder Mckeon [phone]       [facility] [address] [facility]  [facility] [address]        Thank you!     Thank you for choosing [facility]  for your care. Our goal is always to provide you with excellent care. Hearing back from our patients is one way we can continue to improve our services. Please take a few minutes to complete the written survey that you may receive in the mail after your visit with us. Thank you!             Your Updated Medication List - Protect others around you: Learn how to safely use, store and throw away your medicines at www.disposemymeds.org.          This list is accurate as of: 5/12/17  9:03 AM.  Always use your most recent med list.                   Brand Name Dispense Instructions for use    BABY ASPIRIN 81 MG chewable tablet   Generic drug:  aspirin      Take 162 mg by mouth daily Reported on 5/12/2017       calcium & magnesium carbonates 311-232 MG Tabs      Take 1 tablet by mouth daily       cetirizine 10 MG tablet    zyrTEC    30 tablet    Take 1 tablet (10 mg) by mouth every evening       GLUCOSAMINE CHOND COMPLEX/MSM Tabs      Take 0.5 tablets by mouth daily       MULTIVITAMIN  PO      Take 1 tablet by mouth daily with food.       OMEGA-3 FISH OIL PO      Take 1 g by mouth every other day       TYLENOL PO      Take 650 mg by mouth

## 2017-05-12 NOTE — NURSING NOTE
"Chief Complaint   Patient presents with     Physical       Initial /70  Pulse 64  Temp 97.1  F (36.2  C)  Ht 6' 2\" (1.88 m)  Wt 260 lb (117.9 kg)  BMI 33.38 kg/m2 Estimated body mass index is 33.38 kg/(m^2) as calculated from the following:    Height as of this encounter: 6' 2\" (1.88 m).    Weight as of this encounter: 260 lb (117.9 kg).  Medication Reconciliation: complete     Juan A Vitale      "

## 2017-05-12 NOTE — PROGRESS NOTES
SUBJECTIVE:     CC: Rajendra Gagnon is an 52 year old male who presents for preventative health visit.     Healthy Habits:    Do you get at least three servings of calcium containing foods daily (dairy, green leafy vegetables, etc.)? yes    Amount of exercise or daily activities, outside of work: 7 day(s) per week    Problems taking medications regularly No    Medication side effects: No    Have you had an eye exam in the past two years? yes    Do you see a dentist twice per year? yes    Do you have sleep apnea, excessive snoring or daytime drowsiness?no            Today's PHQ-2 Score:   PHQ-2 ( 1999 [employer]) 9/11/2013   Q1: Little interest or pleasure in doing things 0   Q2: Feeling down, depressed or hopeless 1   PHQ-2 Score 1     PHQ-9 SCORE 8/8/2014 5/11/2016 5/12/2017   Total Score 6 - -   Total Score - 4 5         Abuse: Current or Past(Physical, Sexual or Emotional)- No  Do you feel safe in your environment - Yes    Social History   Substance Use Topics     Smoking status: Never Smoker     Smokeless tobacco: Never Used     Alcohol use 2.0 oz/week     4 Cans of beer per week      Comment: rarely     The patient does not drink >3 drinks per day nor >7 drinks per week.    Last PSA:   PSA   Date Value Ref Range Status   05/12/2017 5.08 (H) 0 - 4 ug/L Final     Comment:     Assay Method:  Chemiluminescence using Siemens Vista analyzer       Recent Labs   Lab Test  05/12/17   0754  05/11/16   0750  08/08/14   1427  09/11/13   1111   CHOL  156  176  160  173   HDL  36*  34*  35*  29*   LDL  92  95  94  111   TRIG  140  233*  157*  166*   CHOLHDLRATIO   --    --   4.6  6.0*   NHDL  120  142*   --    --        Reviewed orders with patient. Reviewed health maintenance and updated orders accordingly - Yes    Reviewed and updated as needed this visit by clinical staff  Tobacco  Allergies  Meds  Med Hx  Surg Hx  Fam Hx  Soc Hx        Reviewed and updated as needed this visit by Provider        Past Medical History:  "  Diagnosis Date     Dysthymia 3/26/2012     Hearing loss in right ear 3/14/2012     History of atrial fibrillation 4/23/2012     Hypertriglyceridemia      Right acoustic neuroma 3/14/2012     Tinnitus, unspecified 3/14/2012     Varicose veins       Past Surgical History:   Procedure Laterality Date     acustic neuroma removed  3/2007    right     funnel chest[      states had chest reconstruction     HERNIA REPAIR       ORTHOPEDIC SURGERY      right knee surgery     PE TUBES         ROS:  C: NEGATIVE for fever, chills, change in weight  I: NEGATIVE for worrisome rashes, moles or lesions  E: NEGATIVE for vision changes or irritation  ENT: NEGATIVE for ear, mouth and throat problems  R: NEGATIVE for significant cough or SOB  CV: NEGATIVE for chest pain, palpitations or peripheral edema  GI: NEGATIVE for nausea, abdominal pain, heartburn, or change in bowel habits   male: negative for dysuria, hematuria, decreased urinary stream, erectile dysfunction, urethral discharge  M: NEGATIVE for significant arthralgias or myalgia  N: NEGATIVE for weakness, dizziness or paresthesias  P: NEGATIVE for changes in mood or affect    Problem list, Medication list, Allergies, and Medical/Social/Surgical histories reviewed in [facility] and updated as appropriate.  OBJECTIVE:     /70  Pulse 64  Temp 97.1  F (36.2  C)  Ht 6' 2\" (1.88 m)  Wt 260 lb (117.9 kg)  BMI 33.38 kg/m2  EXAM:  GENERAL: healthy, alert and no distress  EYES: Eyes grossly normal to inspection, PERRL and conjunctivae and sclerae normal  HENT: ear canals and TM's normal, nose and mouth without ulcers or lesions  NECK: no adenopathy, no asymmetry, masses, or scars and thyroid normal to palpation  RESP: lungs clear to auscultation - no rales, rhonchi or wheezes  CV: regular rate and rhythm, normal S1 S2, no S3 or S4, no murmur, click or rub, no peripheral edema and peripheral pulses strong  ABDOMEN: soft, nontender, no hepatosplenomegaly, no masses and bowel " sounds normal  Sliding umibilical hernia    (male): normal male genitalia without lesions or urethral discharge, no hernia  RECTAL: normal sphincter tone, no rectal masses, prostate normal size---- actual small , smooth, nontender without nodules or masses  MS: no gross musculoskeletal defects noted, no edema--- very large varicose veins in both ext.   SKIN: no suspicious lesions or rashes  NEURO: Normal strength and tone, mentation intact and speech normal  PSYCH: mentation appears normal, affect normal/bright  Results for orders placed or performed in visit on 05/12/17 (from the past 24 hour(s))   CBC with platelets differential   Result Value Ref Range    WBC 5.2 4.0 - 11.0 10e9/L    RBC Count 5.10 4.4 - 5.9 10e12/L    Hemoglobin 15.5 13.3 - 17.7 g/dL    Hematocrit 43.9 40.0 - 53.0 %    MCV 86 78 - 100 fl    MCH 30.4 26.5 - 33.0 pg    MCHC 35.3 31.5 - 36.5 g/dL    RDW 12.7 10.0 - 15.0 %    Platelet Count 183 150 - 450 10e9/L    Diff Method Automated Method     % Neutrophils 53.9 %    % Lymphocytes 29.2 %    % Monocytes 11.3 %    % Eosinophils 4.8 %    % Basophils 0.4 %    % Immature Granulocytes 0.4 %    Nucleated RBCs 0 0 /100    Absolute Neutrophil 2.8 1.6 - 8.3 10e9/L    Absolute Lymphocytes 1.5 0.8 - 5.3 10e9/L    Absolute Monocytes 0.6 0.0 - 1.3 10e9/L    Absolute Eosinophils 0.3 0.0 - 0.7 10e9/L    Absolute Basophils 0.0 0.0 - 0.2 10e9/L    Abs Immature Granulocytes 0.0 0 - 0.4 10e9/L    Absolute Nucleated RBC 0.0    Comprehensive metabolic panel   Result Value Ref Range    Sodium 143 133 - 144 mmol/L    Potassium 4.0 3.4 - 5.3 mmol/L    Chloride 108 94 - 109 mmol/L    Carbon Dioxide 29 20 - 32 mmol/L    Anion Gap 6 3 - 14 mmol/L    Glucose 91 70 - 99 mg/dL    Urea Nitrogen 12 7 - 30 mg/dL    Creatinine 0.98 0.66 - 1.25 mg/dL    GFR Estimate 81 >60 mL/min/1.7m2    GFR Estimate If Black >90   GFR Calc   >60 mL/min/1.7m2    Calcium 8.3 (L) 8.5 - 10.1 mg/dL    Bilirubin Total 0.8 0.2 - 1.3  mg/dL    Albumin 4.0 3.4 - 5.0 g/dL    Protein Total 7.1 6.8 - 8.8 g/dL    Alkaline Phosphatase 74 40 - 150 U/L    ALT 30 0 - 70 U/L    AST 22 0 - 45 U/L   Lipid Profile   Result Value Ref Range    Cholesterol 156 <200 mg/dL    Triglycerides 140 <150 mg/dL    HDL Cholesterol 36 (L) >39 mg/dL    LDL Cholesterol Calculated 92 <100 mg/dL    Non HDL Cholesterol 120 <130 mg/dL   Prostate spec antigen screen   Result Value Ref Range    PSA 5.08 (H) 0 - 4 ug/L     EKG - nsr  ASSESSMENT/PLAN:     1. Routine general medical examination at a health care facility  Overall doing well.  LOT of stress in family and work - laid off for 2 yrs.  Lost 30 lbs with increase activity and wt watchers.     2. Hypertriglyceridemia  Doing well. Lipids ok .  HDL little low - discussed   - CBC with platelets differential; Future  - Comprehensive metabolic panel; Future  - Lipid Profile; Future  - EKG 12-lead complete w/read - Clinics; Future  - CBC with platelets differential  - Comprehensive metabolic panel  - Lipid Profile  - EKG 12-lead complete w/read - Clinics    3. Screening for prostate cancer  See below   - Prostate spec antigen screen; Future  - Prostate spec antigen screen    4. Special screening for malignant neoplasms, colon  Discussed. Needs colonoscopy--  Pt is cleared.   - CBC with platelets differential; Future  - Comprehensive metabolic panel; Future  - EKG 12-lead complete w/read - Clinics; Future  - CBC with platelets differential  - Comprehensive metabolic panel  - EKG 12-lead complete w/read - Clinics  - GENERAL SURG ADULT REFERRAL    5. Elevated prostate specific antigen (PSA)  Still in normal growth of 10-15%/ yr.  Higher for younger lori. And smaller prostate.   Discussed and recommend recheck in 6 months. Pt is little anxious- lots of cancer and health issues in family - wants to see Urology. Will refer for another opinion   - UROLOGY ADULT REFERRAL      Umbilical hernia- discussed - need to get fixed at some  "point    Varicose veins- discussed options of fixing them       COUNSELING:  Reviewed preventive health counseling, as reflected in patient instructions       Regular exercise       Healthy diet/nutrition       Colon cancer screening       Prostate cancer screening         reports that he has never smoked. He has never used smokeless tobacco.    Estimated body mass index is 33.38 kg/(m^2) as calculated from the following:    Height as of this encounter: 6' 2\" (1.88 m).    Weight as of this encounter: 260 lb (117.9 kg).   Weight management plan: Discussed healthy diet and exercise guidelines and patient will follow up in 12 months in clinic to re-evaluate.    Counseling Resources:  ATP IV Guidelines  Pooled Cohorts Equation Calculator  FRAX Risk Assessment  ICSI Preventive Guidelines  Dietary Guidelines for Americans, 2010  USDA's MyPlate  ASA Prophylaxis  Lung CA Screening    Bhupinder Mckeon MD  [facility] [place]  "

## 2017-05-13 ASSESSMENT — ANXIETY QUESTIONNAIRES: GAD7 TOTAL SCORE: 8

## 2017-05-13 ASSESSMENT — PATIENT HEALTH QUESTIONNAIRE - PHQ9: SUM OF ALL RESPONSES TO PHQ QUESTIONS 1-9: 5

## 2017-05-26 ENCOUNTER — TELEPHONE (OUTPATIENT)
Dept: SURGERY | Facility: OTHER | Age: 52
End: 2017-05-26

## 2017-05-26 NOTE — TELEPHONE ENCOUNTER
Called to get patient scheduled for meet and greet colonoscopy. Left voicemail to have patient return writers call to get scheduled.    Malinda Monsalve

## 2017-10-02 ENCOUNTER — HOSPITAL ENCOUNTER (EMERGENCY)
Facility: HOSPITAL | Age: 52
Discharge: HOME | End: 2017-10-02
Attending: FAMILY MEDICINE | Admitting: FAMILY MEDICINE
Payer: COMMERCIAL

## 2017-10-02 VITALS
RESPIRATION RATE: 16 BRPM | SYSTOLIC BLOOD PRESSURE: 125 MMHG | DIASTOLIC BLOOD PRESSURE: 89 MMHG | HEART RATE: 85 BPM | TEMPERATURE: 99 F | OXYGEN SATURATION: 98 %

## 2017-10-02 DIAGNOSIS — R00.2 PALPITATIONS: ICD-10-CM

## 2017-10-02 LAB
ALBUMIN SERPL-MCNC: 4 G/DL (ref 3.4–5)
ALP SERPL-CCNC: 80 U/L (ref 40–150)
ALT SERPL W P-5'-P-CCNC: 40 U/L (ref 0–70)
ANION GAP SERPL CALCULATED.3IONS-SCNC: 5 MMOL/L (ref 3–14)
AST SERPL W P-5'-P-CCNC: 30 U/L (ref 0–45)
BASOPHILS # BLD AUTO: 0 10E9/L (ref 0–0.2)
BASOPHILS NFR BLD AUTO: 0.4 %
BILIRUB SERPL-MCNC: 0.7 MG/DL (ref 0.2–1.3)
BUN SERPL-MCNC: 19 MG/DL (ref 7–30)
CALCIUM SERPL-MCNC: 8.3 MG/DL (ref 8.5–10.1)
CHLORIDE SERPL-SCNC: 106 MMOL/L (ref 94–109)
CO2 SERPL-SCNC: 29 MMOL/L (ref 20–32)
CREAT SERPL-MCNC: 1.1 MG/DL (ref 0.66–1.25)
DIFFERENTIAL METHOD BLD: ABNORMAL
EOSINOPHIL # BLD AUTO: 0.2 10E9/L (ref 0–0.7)
EOSINOPHIL NFR BLD AUTO: 1.8 %
ERYTHROCYTE [DISTWIDTH] IN BLOOD BY AUTOMATED COUNT: 12.9 % (ref 10–15)
GFR SERPL CREATININE-BSD FRML MDRD: 71 ML/MIN/1.7M2
GLUCOSE SERPL-MCNC: 94 MG/DL (ref 70–99)
HCT VFR BLD AUTO: 41.8 % (ref 40–53)
HGB BLD-MCNC: 15.3 G/DL (ref 13.3–17.7)
IMM GRANULOCYTES # BLD: 0 10E9/L (ref 0–0.4)
IMM GRANULOCYTES NFR BLD: 0.4 %
LYMPHOCYTES # BLD AUTO: 1.5 10E9/L (ref 0.8–5.3)
LYMPHOCYTES NFR BLD AUTO: 16.4 %
MAGNESIUM SERPL-MCNC: 2.5 MG/DL (ref 1.6–2.3)
MCH RBC QN AUTO: 31.5 PG (ref 26.5–33)
MCHC RBC AUTO-ENTMCNC: 36.6 G/DL (ref 31.5–36.5)
MCV RBC AUTO: 86 FL (ref 78–100)
MONOCYTES # BLD AUTO: 0.6 10E9/L (ref 0–1.3)
MONOCYTES NFR BLD AUTO: 6.3 %
NEUTROPHILS # BLD AUTO: 7 10E9/L (ref 1.6–8.3)
NEUTROPHILS NFR BLD AUTO: 74.7 %
NRBC # BLD AUTO: 0 10*3/UL
NRBC BLD AUTO-RTO: 0 /100
PLATELET # BLD AUTO: 173 10E9/L (ref 150–450)
POTASSIUM SERPL-SCNC: 3.9 MMOL/L (ref 3.4–5.3)
PROT SERPL-MCNC: 7.3 G/DL (ref 6.8–8.8)
RBC # BLD AUTO: 4.85 10E12/L (ref 4.4–5.9)
SODIUM SERPL-SCNC: 140 MMOL/L (ref 133–144)
TROPONIN I SERPL-MCNC: <0.015 UG/L (ref 0–0.04)
TSH SERPL DL<=0.005 MIU/L-ACNC: 1.82 MU/L (ref 0.4–4)
WBC # BLD AUTO: 9.4 10E9/L (ref 4–11)

## 2017-10-02 PROCEDURE — 36415 COLL VENOUS BLD VENIPUNCTURE: CPT | Performed by: FAMILY MEDICINE

## 2017-10-02 PROCEDURE — 71020 ZZHC CHEST TWO VIEWS, FRONT/LAT: CPT | Mod: TC

## 2017-10-02 PROCEDURE — 99285 EMERGENCY DEPT VISIT HI MDM: CPT | Performed by: FAMILY MEDICINE

## 2017-10-02 PROCEDURE — 85025 COMPLETE CBC W/AUTO DIFF WBC: CPT | Performed by: FAMILY MEDICINE

## 2017-10-02 PROCEDURE — 93005 ELECTROCARDIOGRAM TRACING: CPT

## 2017-10-02 PROCEDURE — 83735 ASSAY OF MAGNESIUM: CPT | Performed by: FAMILY MEDICINE

## 2017-10-02 PROCEDURE — 99285 EMERGENCY DEPT VISIT HI MDM: CPT | Mod: 25

## 2017-10-02 PROCEDURE — 80053 COMPREHEN METABOLIC PANEL: CPT | Performed by: FAMILY MEDICINE

## 2017-10-02 PROCEDURE — 84443 ASSAY THYROID STIM HORMONE: CPT | Performed by: FAMILY MEDICINE

## 2017-10-02 PROCEDURE — 84484 ASSAY OF TROPONIN QUANT: CPT | Performed by: FAMILY MEDICINE

## 2017-10-02 RX ORDER — SODIUM CHLORIDE, SODIUM LACTATE, POTASSIUM CHLORIDE, CALCIUM CHLORIDE 600; 310; 30; 20 MG/100ML; MG/100ML; MG/100ML; MG/100ML
INJECTION, SOLUTION INTRAVENOUS CONTINUOUS
Status: DISCONTINUED | OUTPATIENT
Start: 2017-10-02 | End: 2017-10-02

## 2017-10-02 RX ORDER — SODIUM CHLORIDE 9 MG/ML
1000 INJECTION, SOLUTION INTRAVENOUS CONTINUOUS
Status: DISCONTINUED | OUTPATIENT
Start: 2017-10-02 | End: 2017-10-02

## 2017-10-02 NOTE — ED AVS SNAPSHOT
[facility]    [address]    [place] [address]    Phone:  [phone]                                       Rajendra Gagnon   MRN: [id number]    Department:  [facility]   Date of Visit:  10/2/2017           After Visit Summary Signature Page     I have received my discharge instructions, and my questions have been answered. I have discussed any challenges I see with this plan with the nurse or doctor.    ..........................................................................................................................................  Patient/Patient Representative Signature      ..........................................................................................................................................  Patient Representative Print Name and Relationship to Patient    ..................................................               ................................................  Date                                            Time    ..........................................................................................................................................  Reviewed by Signature/Title    ...................................................              ..............................................  Date                                                            Time

## 2017-10-02 NOTE — ED AVS SNAPSHOT
" [facility]    [address] [place] [address]    Phone:  [phone]                                       Rajendra Gagnon   MRN: [id number]    Department:  [facility]   Date of Visit:  10/2/2017           Patient Information     Date Of Birth          1965        Your diagnoses for this visit were:     Palpitations        You were seen by Gege Goins MD.        Discharge Instructions         * Heart Palpitations    Palpitations refers to the feeling that your heart is beating hard, fast or irregular. Some people describe it as \"pounding\" or \"skipped beats\". Palpitations may occur in persons with heart disease, but can also occur in healthy persons. Your doctor does not believe that anything dangerous is causing your symptoms at this time.  Heart-Related Causes:    Arrhythmia (a change from the heart's normal rhythm)    Disease of the heart valves  Non-Heart-Related Causes:    Certain medicines (such as asthma inhalers and decongestants)    Some herbal supplements, energy drinks and pills, and weight loss pills    Illegal stimulant drugs (such as cocaine, crank, methamphetamine, PCP)    Caffeine, alcohol and tobacco    Medical conditions such as thyroid disease, anemia, anxiety and panic disorder  Sometimes the cause cannot be found.  Home Care:  1. Avoid excess caffeine, alcohol, tobacco and any stimulant drugs.  2. Tell your doctor about any prescription or over-the-counter or herbal medicines you take.  Follow Up  with your doctor or as advised by our staff.  Get Prompt Medical Attention  if any of the following occur together with palpitations:    Weakness, dizziness, light-headed or fainting    Chest pain or shortness of breath    Rapid heart rate (over 120 beats per minute, at rest)    Palpitations that lasts over 20 minutes    Weakness of an arm or leg or one side of the face    Difficulty with speech or vision    4535-9336 [employer] [address] [place] [address]. All rights reserved. This information is not intended as a substitute for professional medical care. Always follow your healthcare professional's instructions.             Review of your medicines      Our records show that you are taking the medicines listed below. If these are incorrect, please call your family doctor or clinic.        Dose / Directions Last dose taken    BABY ASPIRIN 81 MG chewable tablet   Dose:  162 mg   Generic drug:  aspirin        Take 162 mg by mouth daily Reported on 5/12/2017   Refills:  0        calcium & magnesium carbonates 311-232 MG Tabs   Dose:  1 tablet   Indication:  along with zinc        Take 1 tablet by mouth daily   Refills:  0        cetirizine 10 MG tablet   Commonly known as:  zyrTEC   Dose:  10 mg   Quantity:  30 tablet        Take 1 tablet (10 mg) by mouth every evening   Refills:  1        GLUCOSAMINE CHOND COMPLEX/MSM Tabs   Dose:  0.5 tablet        Take 0.5 tablets by mouth daily   Refills:  0        MULTIVITAMIN PO   Dose:  1 tablet        Take 1 tablet by mouth daily with food.   Refills:  0        OMEGA-3 FISH OIL PO   Dose:  1 g        Take 1 g by mouth every other day   Refills:  0        TYLENOL PO   Dose:  650 mg        Take 650 mg by mouth   Refills:  0                Procedures and tests performed during your visit     CBC with platelets differential    Chest XR,  PA & LAT    Comprehensive metabolic panel    EKG 12-lead    Magnesium    TSH    Troponin I      Orders Needing Specimen Collection     None      Pending Results     Date and Time Order Name Status Description    10/2/2017 1454 Chest XR,  PA & LAT In process             Pending Culture Results     No orders found from 9/30/2017 to 10/3/2017.            Thank you for choosing [facility]       Thank you for choosing [facility] for your care. Our goal is always to provide you with excellent care. Hearing back from our patients is one way we can continue to improve our  services. Please take a few minutes to complete the written survey that you may receive in the mail after you visit with us. Thank you!        [employer] Information     [facility] gives you secure access to your electronic health record. If you see a primary care provider, you can also send messages to your care team and make appointments. If you have questions, please call your primary care clinic.  If you do not have a primary care provider, please call [phone] and they will assist you.        Care EveryWhere ID     This is your Care EveryWhere ID. This could be used by other organizations to access your [place] medical records  [id number]        Equal Access to Services     BUNNY LOMELI : Josefa Villegas, blaise rashid, niki aranda. So [facility] [phone].    ATENCIÓN: Si habla español, tiene a olvera disposición servicios gratuitos de asistencia lingüística. Llame al [phone].    We comply with applicable federal civil rights laws and [place] laws. We do not discriminate on the basis of race, color, national origin, age, disability, sex, sexual orientation, or gender identity.            After Visit Summary       This is your record. Keep this with you and show to your community pharmacist(s) and doctor(s) at your next visit.

## 2017-10-02 NOTE — LETTER
To Whom it may concern:      Rajendra Gagnon was seen in our Emergency Department today, 10/02/17.  He is cleared to return to work.      Sincerely,      Gege Goins MD

## 2017-10-02 NOTE — ED NOTES
[facility] gate called per patient request and they are going to come  the patient.  [phone]

## 2017-10-02 NOTE — DISCHARGE INSTRUCTIONS
"  * Heart Palpitations    Palpitations refers to the feeling that your heart is beating hard, fast or irregular. Some people describe it as \"pounding\" or \"skipped beats\". Palpitations may occur in persons with heart disease, but can also occur in healthy persons. Your doctor does not believe that anything dangerous is causing your symptoms at this time.  Heart-Related Causes:    Arrhythmia (a change from the heart's normal rhythm)    Disease of the heart valves  Non-Heart-Related Causes:    Certain medicines (such as asthma inhalers and decongestants)    Some herbal supplements, energy drinks and pills, and weight loss pills    Illegal stimulant drugs (such as cocaine, crank, methamphetamine, PCP)    Caffeine, alcohol and tobacco    Medical conditions such as thyroid disease, anemia, anxiety and panic disorder  Sometimes the cause cannot be found.  Home Care:  1. Avoid excess caffeine, alcohol, tobacco and any stimulant drugs.  2. Tell your doctor about any prescription or over-the-counter or herbal medicines you take.  Follow Up  with your doctor or as advised by our staff.  Get Prompt Medical Attention  if any of the following occur together with palpitations:    Weakness, dizziness, light-headed or fainting    Chest pain or shortness of breath    Rapid heart rate (over 120 beats per minute, at rest)    Palpitations that lasts over 20 minutes    Weakness of an arm or leg or one side of the face    Difficulty with speech or vision    6304-6707 [employer]. [address] [place] [address]. All rights reserved. This information is not intended as a substitute for professional medical care. Always follow your healthcare professional's instructions.        "

## 2017-10-02 NOTE — ED NOTES
Patient presents to the emergency room with concerns about a fast and irregular heart rate.  Patient states he first noticed this while at work and that her rate was between 's.  Patient states when his heart rate was beating this fast he has some dizziness but no chest pain or SOB.  At this time patient is awake/alert and interactive with staff.  Skin is pink, warm, dry and intact.  Cardiac monitor on, showing NSR 90's with no ectopy.  IV was placed with labs drawn.  Rest of nursing assessment as charted.  Patient states he did drink 30 OZ of caffeine today and also reports increase stress over the last few years.  MD at bedside to martínez

## 2017-10-05 DIAGNOSIS — R00.2 PALPITATIONS: Primary | ICD-10-CM

## 2017-10-05 NOTE — ED PROVIDER NOTES
eMERGENCY dEPARTMENT eNCOUnter        CHIEF COMPLAINT    Chief Complaint   Patient presents with     Palpitations       HPI    Rajendra Gagnon is a 52 year old male who presents feeling palpitations and a fast heart rate for the last couple of hours.  He was at work at [employer].  Nothing unusual at work except they are preparing for a shut-down.  He ate some smoked salmon that a co-worker brought and after that felt a little sick to his stomach.  Then he felt his heart was going fast.  At one point he things it might have been 120.  he is on the emergency response team.    REVIEW OF SYSTEMS    Cardiac:no Chest Pain, No syncope  Respiratory: No cough or hemoptysis  GI: No Vomiting or Diarrhea  : No Dysuria or Hematuria  General: No Fever or Chills  All other systems reviewed and are negative.    PAST MEDICAL & SURGICAL HISTORY    Past Medical History:   Diagnosis Date     Dysthymia 3/26/2012     Hearing loss in right ear 3/14/2012     History of atrial fibrillation 4/23/2012     Hypertriglyceridemia      Right acoustic neuroma 3/14/2012     Tinnitus, unspecified 3/14/2012     Umbilical hernia without obstruction and without gangrene 5/11/2016     Varicose veins      Varicose veins of both lower extremities 5/12/2017     Past Surgical History:   Procedure Laterality Date     acustic neuroma removed  3/2007    right     funnel chest[      states had chest reconstruction     HERNIA REPAIR       ORTHOPEDIC SURGERY      right knee surgery     PE TUBES         CURRENT MEDICATIONS    Current Outpatient Rx   Medication Sig Dispense Refill     Omega-3 Fatty Acids (OMEGA-3 FISH OIL PO) Take 1 g by mouth every other day       calcium & magnesium carbonates 311-232 MG TABS Take 1 tablet by mouth daily        Multiple Vitamins-Minerals (MULTIVITAMIN OR) Take 1 tablet by mouth daily with food.       Acetaminophen (TYLENOL PO) Take 650 mg by mouth       Misc Natural Products (GLUCOSAMINE CHOND COMPLEX/MSM) TABS Take 0.5 tablets by  mouth daily       cetirizine (ZYRTEC) 10 MG tablet Take 1 tablet (10 mg) by mouth every evening 30 tablet 1     aspirin (BABY ASPIRIN) 81 MG chewable tablet Take 162 mg by mouth daily Reported on 5/12/2017         ALLERGIES    Allergies   Allergen Reactions     Aspartame      Venlafaxine Other (See Comments)     Night terrors       SOCIAL & FAMILY HISTORY    Social History     Social History     Marital status:      Spouse name: N/A     Number of children: N/A     Years of education: N/A     Occupational History     Maintanance /Oiler      [employer]     Social History Main Topics     Smoking status: Never Smoker     Smokeless tobacco: Never Used     Alcohol use 2.0 oz/week     4 Cans of beer per week      Comment: rarely     Drug use: No     Sexual activity: Not Asked     Other Topics Concern     Caffeine Concern Yes     2 cups coffee daily     Parent/Sibling W/ Cabg, Mi Or Angioplasty Before 65f 55m? No     Social History Narrative     Family History   Problem Relation Age of Onset     DIABETES Mother      type 2     Hypertension Mother      HEART DISEASE Mother      Lipids Mother      Alcohol/Drug Father      CANCER Maternal Grandmother      Eye Disorder Maternal Grandfather      HEART DISEASE Maternal Grandfather      CANCER Maternal Grandfather      CANCER Paternal Grandmother      Prostate Cancer Paternal Grandfather      Other - See Comments Daughter      lupus     KIDNEY DISEASE Daughter      KIDNEY DISEASE Daughter        PHYSICAL EXAM    VITAL SIGNS: /89  Pulse 85  Temp 99  F (37.2  C) (Oral)  Resp 16  SpO2 98%   Constitutional:  Well developed, well nourished, no acute distress   HENT:  Atraumatic, moist mucus membranes  Neck: supple, no JVD   Respiratory:  Lungs Clear, no retractions   Cardiovascular:  regular rate, no murmurs  Vascular: Radial pulses 2+ equal bilaterally  GI:  Soft, nontender, normal bowel sounds  Musculoskeletal:  No edema, no acute deformities  Integument:   Skin warm and dry, no petechiae   Neurologic:  Alert & oriented, no slurred speech  Psych: Pleasant affect, no hallucinations    EKG    Interpreted by emergency department physician:  Normal sinus rhythm.  Rate of 90.  No ectopy.    RADIOLOGY/PROCEDURES    CXR:    Results for orders placed or performed during the hospital encounter of 10/02/17   Chest XR,  PA & LAT    Narrative    TWO VIEWS OF CHEST    CLINICAL HISTORY:  Palpitations.    COMPARISON:  Today's study is compared to a prior examination which is  dated June 16, 2016.    FINDINGS:  Cardiac silhouette and pulmonary vasculature are within  normal limits.  Lungs are clear on both projections.  Pectus excavatum  is seen within the sternum.    IMPRESSION:  1.  CLEAR LUNGS, WITHOUT EVIDENCE OF ACUTE OR ACTIVE DISEASE.    2.  PECTUS EXCAVATUM.  Exam Date: Oct 02, 2017 03:11:00 PM  Author: ERNIE STEVENS  This report is final and signed           ED COURSE & MEDICAL DECISION MAKING    Pertinent Labs & Imaging studies reviewed and interpreted. (See chart for details)        See chart for details of medications given during the ED stay.    Vitals:    10/02/17 1459 10/02/17 1554 10/02/17 1700 10/02/17 1701   BP:  130/80 125/89 125/89   Pulse:    85   Resp: 17 (!) 31 (!) 9 16   Temp:    99  F (37.2  C)   TempSrc:    Oral   SpO2: 96%  97% 98%         FINAL IMPRESSION    1. Palpitations        Plan:  His workup has been negative here.  His heart rate remained in the high 80s and 90s, without ectopy.  That seems like it would be high for a healthy male.  I have ordered a ziopatch, they will contact him tomorrow to plan set up.  Return here in the meatime as needed.     Gege Goins MD  10/05/17 9709

## 2017-10-17 ENCOUNTER — TELEPHONE (OUTPATIENT)
Dept: FAMILY MEDICINE | Facility: OTHER | Age: 52
End: 2017-10-17

## 2017-10-17 NOTE — TELEPHONE ENCOUNTER
11:35 AM    Reason for Call: future OVERBOOK    Patient is having the following symptoms: patient needs a hospital follow up, calling from [facility], has questions about getting a Holter monitor     The patient is requesting an appointment for within a week.    Was an appointment offered for this call? No  If yes : Appointment type              Date    Preferred method for responding to this message: Telephone Call    What is your phone number [phone], bad reception but will try to call you back asap    If we cannot reach you directly, may we leave a detailed response at the number you provided? Yes    Can this message wait until your PCP/provider returns, if unavailable today? Not applicable, provider in    Abbie Carrillofin

## 2017-10-19 ENCOUNTER — APPOINTMENT (OUTPATIENT)
Dept: CT IMAGING | Facility: HOSPITAL | Age: 52
End: 2017-10-19
Attending: EMERGENCY MEDICINE
Payer: COMMERCIAL

## 2017-10-19 ENCOUNTER — HOSPITAL ENCOUNTER (EMERGENCY)
Facility: HOSPITAL | Age: 52
Discharge: HOME | End: 2017-10-19
Attending: EMERGENCY MEDICINE | Admitting: EMERGENCY MEDICINE
Payer: COMMERCIAL

## 2017-10-19 VITALS
HEIGHT: 74 IN | RESPIRATION RATE: 20 BRPM | WEIGHT: 260 LBS | SYSTOLIC BLOOD PRESSURE: 136 MMHG | TEMPERATURE: 97 F | DIASTOLIC BLOOD PRESSURE: 88 MMHG | BODY MASS INDEX: 33.37 KG/M2 | OXYGEN SATURATION: 98 % | HEART RATE: 85 BPM

## 2017-10-19 DIAGNOSIS — M62.81 MUSCLE WEAKNESS (GENERALIZED): ICD-10-CM

## 2017-10-19 DIAGNOSIS — R20.0 NUMBNESS OF FACE: Primary | ICD-10-CM

## 2017-10-19 LAB
ALBUMIN SERPL-MCNC: 4 G/DL (ref 3.4–5)
ALP SERPL-CCNC: 67 U/L (ref 40–150)
ALT SERPL W P-5'-P-CCNC: 35 U/L (ref 0–70)
ANION GAP SERPL CALCULATED.3IONS-SCNC: 6 MMOL/L (ref 3–14)
APTT PPP: 27 SEC (ref 24–37)
AST SERPL W P-5'-P-CCNC: 20 U/L (ref 0–45)
BASOPHILS # BLD AUTO: 0 10E9/L (ref 0–0.2)
BASOPHILS NFR BLD AUTO: 0.5 %
BILIRUB SERPL-MCNC: 0.7 MG/DL (ref 0.2–1.3)
BUN SERPL-MCNC: 15 MG/DL (ref 7–30)
CALCIUM SERPL-MCNC: 9.1 MG/DL (ref 8.5–10.1)
CHLORIDE SERPL-SCNC: 104 MMOL/L (ref 94–109)
CO2 SERPL-SCNC: 28 MMOL/L (ref 20–32)
CREAT SERPL-MCNC: 0.96 MG/DL (ref 0.66–1.25)
DIFFERENTIAL METHOD BLD: NORMAL
EOSINOPHIL # BLD AUTO: 0.1 10E9/L (ref 0–0.7)
EOSINOPHIL NFR BLD AUTO: 1.2 %
ERYTHROCYTE [DISTWIDTH] IN BLOOD BY AUTOMATED COUNT: 12.9 % (ref 10–15)
GFR SERPL CREATININE-BSD FRML MDRD: 82 ML/MIN/1.7M2
GLUCOSE BLDC GLUCOMTR-MCNC: 83 MG/DL (ref 70–99)
GLUCOSE SERPL-MCNC: 97 MG/DL (ref 70–99)
HCT VFR BLD AUTO: 44.8 % (ref 40–53)
HGB BLD-MCNC: 15.9 G/DL (ref 13.3–17.7)
IMM GRANULOCYTES # BLD: 0 10E9/L (ref 0–0.4)
IMM GRANULOCYTES NFR BLD: 0.5 %
INR PPP: 1.04 (ref 0.8–1.2)
LYMPHOCYTES # BLD AUTO: 1.1 10E9/L (ref 0.8–5.3)
LYMPHOCYTES NFR BLD AUTO: 16.6 %
MCH RBC QN AUTO: 30.8 PG (ref 26.5–33)
MCHC RBC AUTO-ENTMCNC: 35.5 G/DL (ref 31.5–36.5)
MCV RBC AUTO: 87 FL (ref 78–100)
MONOCYTES # BLD AUTO: 0.4 10E9/L (ref 0–1.3)
MONOCYTES NFR BLD AUTO: 6.4 %
NEUTROPHILS # BLD AUTO: 4.8 10E9/L (ref 1.6–8.3)
NEUTROPHILS NFR BLD AUTO: 74.8 %
NRBC # BLD AUTO: 0 10*3/UL
NRBC BLD AUTO-RTO: 0 /100
PLATELET # BLD AUTO: 166 10E9/L (ref 150–450)
POTASSIUM SERPL-SCNC: 4.5 MMOL/L (ref 3.4–5.3)
PROT SERPL-MCNC: 7.3 G/DL (ref 6.8–8.8)
RBC # BLD AUTO: 5.17 10E12/L (ref 4.4–5.9)
SODIUM SERPL-SCNC: 138 MMOL/L (ref 133–144)
TROPONIN I SERPL-MCNC: <0.015 UG/L (ref 0–0.04)
WBC # BLD AUTO: 6.4 10E9/L (ref 4–11)

## 2017-10-19 PROCEDURE — 36415 COLL VENOUS BLD VENIPUNCTURE: CPT | Performed by: EMERGENCY MEDICINE

## 2017-10-19 PROCEDURE — 86618 LYME DISEASE ANTIBODY: CPT | Performed by: EMERGENCY MEDICINE

## 2017-10-19 PROCEDURE — 93005 ELECTROCARDIOGRAM TRACING: CPT

## 2017-10-19 PROCEDURE — 80053 COMPREHEN METABOLIC PANEL: CPT | Performed by: EMERGENCY MEDICINE

## 2017-10-19 PROCEDURE — 70450 CT HEAD/BRAIN W/O DYE: CPT | Mod: TC,59

## 2017-10-19 PROCEDURE — 84484 ASSAY OF TROPONIN QUANT: CPT | Performed by: EMERGENCY MEDICINE

## 2017-10-19 PROCEDURE — 99285 EMERGENCY DEPT VISIT HI MDM: CPT | Mod: 25

## 2017-10-19 PROCEDURE — 70498 CT ANGIOGRAPHY NECK: CPT | Mod: TC

## 2017-10-19 PROCEDURE — 25000128 H RX IP 250 OP 636: Performed by: RADIOLOGY

## 2017-10-19 PROCEDURE — 99285 EMERGENCY DEPT VISIT HI MDM: CPT | Performed by: EMERGENCY MEDICINE

## 2017-10-19 PROCEDURE — 85025 COMPLETE CBC W/AUTO DIFF WBC: CPT | Performed by: EMERGENCY MEDICINE

## 2017-10-19 PROCEDURE — 93010 ELECTROCARDIOGRAM REPORT: CPT | Performed by: INTERNAL MEDICINE

## 2017-10-19 PROCEDURE — 85730 THROMBOPLASTIN TIME PARTIAL: CPT | Performed by: EMERGENCY MEDICINE

## 2017-10-19 PROCEDURE — 25000128 H RX IP 250 OP 636: Performed by: EMERGENCY MEDICINE

## 2017-10-19 PROCEDURE — 85610 PROTHROMBIN TIME: CPT | Performed by: EMERGENCY MEDICINE

## 2017-10-19 PROCEDURE — 00000146 ZZHCL STATISTIC GLUCOSE BY METER IP

## 2017-10-19 RX ORDER — IOPAMIDOL 755 MG/ML
50 INJECTION, SOLUTION INTRAVASCULAR ONCE
Status: COMPLETED | OUTPATIENT
Start: 2017-10-19 | End: 2017-10-19

## 2017-10-19 RX ADMIN — SODIUM CHLORIDE 500 ML: 9 INJECTION, SOLUTION INTRAVENOUS at 11:31

## 2017-10-19 RX ADMIN — IOPAMIDOL 50 ML: 755 INJECTION, SOLUTION INTRAVENOUS at 11:52

## 2017-10-19 ASSESSMENT — ENCOUNTER SYMPTOMS: NUMBNESS: 1

## 2017-10-19 NOTE — DISCHARGE INSTRUCTIONS
Electromyogram  Your test is on (date) ________________ at (time) __________  [facility]  Neuromuscular laboratory, [address] [place] [address]  Phone: [phone]; Fax: [phone]  What is an electromyogram (EMG)?  This test uses electrodes and a needle to measure the electrical signals of your nerves and muscles. It helps find the cause of weakness, spasms, numbness or pain in the arms, legs or face. The test helps diagnose muscle and nerve diseases.  How is the test done?  The test takes 30 to 60 minutes. Before the test, you will have a brief exam.  Part 1    We place electrodes on the skin.    An electric charge is sent along the nerve. Each charge causes a brief shock.    This allows us to measure how well the nerve carries the signal.    Part 2    The doctor inserts a fine needle into various muscle groups. There is no electric charge.    The doctor can see and hear the muscle's electrical activity on the monitors.    What if I'm feeling nervous?  Most people handle the test without a problem. We do not prescribe medicine for this test. If you feel very anxious, call your regular doctor and discuss options to help you relax.  How do I get the results?  Your regular doctor will receive a report in about a week. You will need to call your doctor.  For informational purposes only. Not to replace the advice of your health care provider.  Copyright   2015 [employer]. All rights reserved. [facility] 423443 - REV 02/17.      Weakness (Uncertain Cause)  Based on your exam today, the exact cause of your weakness is not certain. However, your weakness does not seem to be a sign of a serious illness at this time. Keep an eye on your symptoms and get medical advice as instructed below.  Home care    Rest at home today. Do not over-exert yourself.    Take any medicine as prescribed.    For the next few days, drink extra fluids (unless your  healthcare provider wants you to restrict fluids for other reasons). Do not skip meals.  Follow-up care  Follow up with your healthcare provider or as advised.  When to seek medical advice  Call your healthcare provider for any of the following    Worsening of your symptoms    Symptoms don't start getting better within 2 days    Fever of 100.4  F (38  C) or higher, or as directed by your healthcare provider     Call 911  Get emergency medical care for any of these:    Chest, arm, neck, jaw or upper back pain    Trouble breathing    Numbness or weakness of the face, one arm or one leg    Slurred speech, confusion, trouble speaking, walking or seeing    Blood in vomit or stool (black or red color)    Loss of consciousness  Date Last Reviewed: 6/10/2015    0404-3708 [employer]. [address] [place] [address], [place] [address]. All rights reserved. This information is not intended as a substitute for professional medical care. Always follow your healthcare professional's instructions.

## 2017-10-19 NOTE — ED NOTES
Pt with numbness to face, right more than left and feeling than he is going to slide off bed.  Pt alert and oriented with clear speech, smile equal, good hand grasps with no arm drift.  Negative FAST. Neuro faby called at 0924 and Dr curry here within a minute.  Pt has hx of acoustic tumor on the right. Also was in [facility] 2 days ago for palpitations.

## 2017-10-19 NOTE — ED PROVIDER NOTES
History     Chief Complaint   Patient presents with     Numbness     facial, hands     HPI  Rajendra Gagnon is a 52 year old male who presents to the emergency department accompanied by the spouse.  Patient has been having bilateral facial numbness but especially worse on the right side over the last 24 hours.  He started feeling numbness on both sides of the face yesterday but woke up this morning feeling more numbness on the right side of the face.  Denies any unilateral body weakness, fever, visual changes, facial drooping, slurring of speech.  He had right acoustic neuroma removal surgery several years ago.  He has always felt mild weakness on the right side of face but it seems to have worsened since yesterday.  Patient was treated for palpitations at [facility] in [place] last week.  Thoroughly investigated and discharged home on Xanax for anxiety.  Denies chest pain, weight loss, night sweats, shortness of breath.    Problem List:    Patient Active Problem List    Diagnosis Date Noted     Varicose veins of both lower extremities 05/12/2017     Priority: Medium     ACP (advance care planning) 05/11/2016     Priority: Medium     Advance Care Planning 5/11/2016: ACP Review of Chart / Resources Provided:  Reviewed chart for advance care plan.  Rajendra Gagnon has no plan or code status on file. Discussed available resources and provided with information.   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 Provider to review       Right acoustic neuroma 03/14/2012     Priority: Medium        Past Medical History:    Past Medical History:   Diagnosis Date      Dysthymia 3/26/2012     Hearing loss in right ear 3/14/2012     History of atrial fibrillation 4/23/2012     Hypertriglyceridemia      Right acoustic neuroma 3/14/2012     Tinnitus, unspecified 3/14/2012     Umbilical hernia without obstruction and without gangrene 5/11/2016     Varicose veins      Varicose veins of both lower extremities 5/12/2017       Past Surgical History:    Past Surgical History:   Procedure Laterality Date     acustic neuroma removed  3/2007    right     funnel chest[      states had chest reconstruction     HERNIA REPAIR       ORTHOPEDIC SURGERY      right knee surgery     PE TUBES         Family History:    Family History   Problem Relation Age of Onset     DIABETES Mother      type 2     Hypertension Mother      HEART DISEASE Mother      Lipids Mother      Alcohol/Drug Father      CANCER Maternal Grandmother      Eye Disorder Maternal Grandfather      HEART DISEASE Maternal Grandfather      CANCER Maternal Grandfather      CANCER Paternal Grandmother      Prostate Cancer Paternal Grandfather      Other - See Comments Daughter      lupus     KIDNEY DISEASE Daughter      KIDNEY DISEASE Daughter        Social History:  Marital Status:   [2]  Social History   Substance Use Topics     Smoking status: Never Smoker     Smokeless tobacco: Never Used     Alcohol use 2.0 oz/week     4 Cans of beer per week      Comment: rarely        Medications:      ASPIRIN PO   DiphenhydrAMINE HCl (BENADRYL PO)   Acetaminophen (TYLENOL PO)   Omega-3 Fatty Acids (OMEGA-3 FISH OIL PO)   calcium & magnesium carbonates 311-232 MG TABS   aspirin (BABY ASPIRIN) 81 MG chewable tablet   Multiple Vitamins-Minerals (MULTIVITAMIN OR)         Review of Systems   Neurological: Positive for numbness.   All other systems reviewed and are negative.      Physical Exam   BP: 145/97  Pulse: 85  Heart Rate: 79  Temp: 97.6  F (36.4  C)  Resp: (!) 10 (Simultaneous filing. User may not have seen previous data.)  Height: 188  "cm (6' 2\")  Weight: 117.9 kg (260 lb)  SpO2: 99 % (Simultaneous filing. User may not have seen previous data.)      Physical Exam   Constitutional: He is oriented to person, place, and time. He appears well-developed and well-nourished. No distress.   HENT:   Head: Normocephalic and atraumatic.   Mouth/Throat: Oropharynx is clear and moist. No oropharyngeal exudate.   Eyes: Pupils are equal, round, and reactive to light. No scleral icterus.   Cardiovascular: Regular rhythm, normal heart sounds and intact distal pulses.    Pulmonary/Chest: Breath sounds normal. No respiratory distress. He has no wheezes. He has no rales.   Abdominal: Soft. Bowel sounds are normal. He exhibits no distension. There is no tenderness. There is no rebound.   Musculoskeletal: He exhibits no edema or tenderness.   Neurological: He is alert and oriented to person, place, and time. He displays normal reflexes. No cranial nerve deficit. He exhibits normal muscle tone. Coordination normal.   Skin: Skin is warm. No rash noted. He is not diaphoretic.   Nursing note and vitals reviewed.      ED Course   Patient evaluated and laboratory tests ordered.  Normal EKG.  CT scan of the head ordered.    ED Course     Procedures         Labs Ordered and Resulted from Time of ED Arrival Up to the Time of Departure from the ED   CBC WITH PLATELETS DIFFERENTIAL   COMPREHENSIVE METABOLIC PANEL   INR   PARTIAL THROMBOPLASTIN TIME   TROPONIN I   GLUCOSE BY METER   LYME DISEASE DANIELLE WITH REFLEX TO WB SERUM   VITAL SIGNS AND NEURO CHECKS   ACTIVITY   PULSE OXIMETRY NURSING   GLUCOSE MONITOR NURSING POCT   ASSESSMENT   NOTIFY       Assessments & Plan (with Medical Decision Making)   Bilateral facial numbness and generalized weakness: Patient thoroughly investigated in the emergency department but all results were normal.  Normal CT scan of the head, normal CT angiogram of the head and neck, normal CBC, CMP, INR, troponin, urinalysis, chest x-ray.  Discussed my " findings with the patient.  Patient subsequently discharged home.  Advised follow-up with neurologist through PCP referral.  All questions answered.  Patient subsequently discharged home.    I have reviewed the nursing notes.    I have reviewed the findings, diagnosis, plan and need for follow up with the patient.    Discharge Medication List as of 10/19/2017  1:39 PM          Final diagnoses:   Numbness of face   Muscle weakness (generalized)       10/19/2017   [facility]     Azael Solis MD  10/19/17 6855

## 2017-10-19 NOTE — ED AVS SNAPSHOT
[facility]    [address]    [place] [address]    Phone:  [phone]                                       Rajendra Gagnon   MRN: [id number]    Department:  [facility]   Date of Visit:  10/19/2017           After Visit Summary Signature Page     I have received my discharge instructions, and my questions have been answered. I have discussed any challenges I see with this plan with the nurse or doctor.    ..........................................................................................................................................  Patient/Patient Representative Signature      ..........................................................................................................................................  Patient Representative Print Name and Relationship to Patient    ..................................................               ................................................  Date                                            Time    ..........................................................................................................................................  Reviewed by Signature/Title    ...................................................              ..............................................  Date                                                            Time

## 2017-10-19 NOTE — ED AVS SNAPSHOT
[facility]    [address] [place] [address]    [place] [address]    Phone:  [phone]                                       Rajendra Gagnon   MRN: [id number]    Department:  [facility]   Date of Visit:  10/19/2017           Patient Information     Date Of Birth          1965        Your diagnoses for this visit were:     Numbness of face     Muscle weakness (generalized)        You were seen by Azael Solis MD.      Follow-up Information     Follow up with Bhupinder Mckeon MD In 1 day.    Specialty:  Family Practice    Why:  Re-evaluation    Contact information:    [facility]  [address] [facility] [place] [id number] [phone]          Discharge Instructions       Electromyogram  Your test is on (date) ________________ at (time) __________  [facility]  Neuromuscular laboratory, [address] [place] [address]  Phone: [phone]; Fax: [phone]  What is an electromyogram (EMG)?  This test uses electrodes and a needle to measure the electrical signals of your nerves and muscles. It helps find the cause of weakness, spasms, numbness or pain in the arms, legs or face. The test helps diagnose muscle and nerve diseases.  How is the test done?  The test takes 30 to 60 minutes. Before the test, you will have a brief exam.  Part 1    We place electrodes on the skin.    An electric charge is sent along the nerve. Each charge causes a brief shock.    This allows us to measure how well the nerve carries the signal.    Part 2    The doctor inserts a fine needle into various muscle groups. There is no electric charge.    The doctor can see and hear the muscle's electrical activity on the monitors.    What if I'm feeling nervous?  Most people handle the test without a problem. We do not prescribe medicine for this test. If you feel very anxious, call your regular doctor and discuss options to help you relax.  How do I get the  results?  Your regular doctor will receive a report in about a week. You will need to call your doctor.  For informational purposes only. Not to replace the advice of your health care provider.  Copyright   2015 [facility]. All rights reserved. [employer] [id number] - REV 02/17.      Weakness (Uncertain Cause)  Based on your exam today, the exact cause of your weakness is not certain. However, your weakness does not seem to be a sign of a serious illness at this time. Keep an eye on your symptoms and get medical advice as instructed below.  Home care    Rest at home today. Do not over-exert yourself.    Take any medicine as prescribed.    For the next few days, drink extra fluids (unless your healthcare provider wants you to restrict fluids for other reasons). Do not skip meals.  Follow-up care  Follow up with your healthcare provider or as advised.  When to seek medical advice  Call your healthcare provider for any of the following    Worsening of your symptoms    Symptoms don't start getting better within 2 days    Fever of 100.4  F (38  C) or higher, or as directed by your healthcare provider     Call 911  Get emergency medical care for any of these:    Chest, arm, neck, jaw or upper back pain    Trouble breathing    Numbness or weakness of the face, one arm or one leg    Slurred speech, confusion, trouble speaking, walking or seeing    Blood in vomit or stool (black or red color)    Loss of consciousness  Date Last Reviewed: 6/10/2015    6486-2011 [employer]. [address] [place] [address]. All rights reserved. This information is not intended as a substitute for professional medical care. Always follow your healthcare professional's instructions.             Review of your medicines      Our records show that you are taking the medicines listed below. If these are incorrect, please call your family doctor or clinic.        Dose / Directions Last dose taken    ASPIRIN PO    Dose:  325 mg        Take 325 mg by mouth once   Refills:  0        BABY ASPIRIN 81 MG chewable tablet   Dose:  162 mg   Generic drug:  aspirin        Take 162 mg by mouth daily Reported on 5/12/2017   Refills:  0        BENADRYL PO   Dose:  25 mg        Take 25 mg by mouth   Refills:  0        calcium & magnesium carbonates 311-232 MG Tabs   Dose:  1 tablet   Indication:  along with zinc        Take 1 tablet by mouth daily   Refills:  0        MULTIVITAMIN PO   Dose:  1 tablet        Take 1 tablet by mouth daily with food.   Refills:  0        OMEGA-3 FISH OIL PO   Dose:  1 g        Take 1 g by mouth every other day   Refills:  0        TYLENOL PO   Dose:  650 mg        Take 650 mg by mouth   Refills:  0                Procedures and tests performed during your visit     Activity: Bedrest    CBC with platelets differential    CT Head Neck Angio w/o & w Contrast    CT Head w/o Contrast    Comprehensive metabolic panel    EKG 12-lead, tracing only    Glucose by meter    Glucose monitor nursing POCT    INR    Partial thromboplastin time    Pulse oximetry nursing    Troponin I    Vital signs and neuro checks      Orders Needing Specimen Collection     None      Pending Results     No orders found from 10/17/2017 to 10/20/2017.            Pending Culture Results     No orders found from 10/17/2017 to 10/20/2017.            Thank you for choosing [facility]       Thank you for choosing [facility] for your care. Our goal is always to provide you with excellent care. Hearing back from our patients is one way we can continue to improve our services. Please take a few minutes to complete the written survey that you may receive in the mail after you visit with us. Thank you!        fl3urhart Information     [employer] gives you secure access to your electronic health record. If you see a primary care provider, you can also send messages to your care team and make appointments. If you have questions, please call your primary care  clinic.  If you do not have a primary care provider, please call [phone] and they will assist you.        Care EveryWhere ID     This is your Care EveryWhere ID. This could be used by other organizations to access your [place] medical records  [id number]        Equal Access to Services     BUNNY LOMELI : Josefa Villegas, wariri rashid, hector finchalmikki fuentes, niki dumont. So [facility] [phone].    ATENCIÓN: Si habla español, tiene a olvera disposición servicios gratuitos de asistencia lingüística. Llame al [phone].    We comply with applicable federal civil rights laws and [place] laws. We do not discriminate on the basis of race, color, national origin, age, disability, sex, sexual orientation, or gender identity.            After Visit Summary       This is your record. Keep this with you and show to your community pharmacist(s) and doctor(s) at your next visit.

## 2017-10-19 NOTE — ED NOTES
Pt brought back to room 11.  FAST exam neg with exception of slight lip droop to right side pt's wife states WNL from brain tumor removal 2007.

## 2017-10-19 NOTE — LETTER
To Whom it may concern:      Rajendra SWAPNA Chelsi was seen in our Emergency Department today, 10/19/17.  Pt will F/U with his Primary Care Physician for a return to work.    Sincerely,        Sivan Covington RN/ Dr Solis

## 2017-10-19 NOTE — ED NOTES
"Pt awaiting results. States less numbness in body still slight numbness in right side of face.  No further sensation of sliding off cart.  Pt also feels his speech is better-felt \"slow\" earlier though wife reports she didn't think speech was different.  "

## 2017-10-23 LAB — B BURGDOR IGG+IGM SER QL: 0.77 (ref 0–0.89)

## 2017-10-23 NOTE — PROGRESS NOTES
Negative Lyme Disease Antibodies Serum test results routed to PCP, Dr. ARGENTINA Mckeon. Pt has follow up appt with Dr. Mckeon on 10/24.

## 2017-10-24 ENCOUNTER — TELEPHONE (OUTPATIENT)
Dept: FAMILY MEDICINE | Facility: OTHER | Age: 52
End: 2017-10-24

## 2017-10-24 ENCOUNTER — OFFICE VISIT (OUTPATIENT)
Dept: FAMILY MEDICINE | Facility: OTHER | Age: 52
End: 2017-10-24
Attending: FAMILY MEDICINE
Payer: COMMERCIAL

## 2017-10-24 VITALS
SYSTOLIC BLOOD PRESSURE: 128 MMHG | OXYGEN SATURATION: 98 % | DIASTOLIC BLOOD PRESSURE: 84 MMHG | HEIGHT: 74 IN | TEMPERATURE: 97.5 F | WEIGHT: 271.6 LBS | BODY MASS INDEX: 34.86 KG/M2 | HEART RATE: 70 BPM

## 2017-10-24 DIAGNOSIS — F43.23 ADJUSTMENT DISORDER WITH MIXED ANXIETY AND DEPRESSED MOOD: Primary | ICD-10-CM

## 2017-10-24 DIAGNOSIS — Z12.11 SPECIAL SCREENING FOR MALIGNANT NEOPLASMS, COLON: Primary | ICD-10-CM

## 2017-10-24 PROCEDURE — 99214 OFFICE O/P EST MOD 30 MIN: CPT | Performed by: FAMILY MEDICINE

## 2017-10-24 ASSESSMENT — ANXIETY QUESTIONNAIRES
2. NOT BEING ABLE TO STOP OR CONTROL WORRYING: MORE THAN HALF THE DAYS
4. TROUBLE RELAXING: MORE THAN HALF THE DAYS
6. BECOMING EASILY ANNOYED OR IRRITABLE: SEVERAL DAYS
3. WORRYING TOO MUCH ABOUT DIFFERENT THINGS: MORE THAN HALF THE DAYS
GAD7 TOTAL SCORE: 12
1. FEELING NERVOUS, ANXIOUS, OR ON EDGE: MORE THAN HALF THE DAYS
IF YOU CHECKED OFF ANY PROBLEMS ON THIS QUESTIONNAIRE, HOW DIFFICULT HAVE THESE PROBLEMS MADE IT FOR YOU TO DO YOUR WORK, TAKE CARE OF THINGS AT HOME, OR GET ALONG WITH OTHER PEOPLE: SOMEWHAT DIFFICULT
5. BEING SO RESTLESS THAT IT IS HARD TO SIT STILL: SEVERAL DAYS
7. FEELING AFRAID AS IF SOMETHING AWFUL MIGHT HAPPEN: MORE THAN HALF THE DAYS

## 2017-10-24 ASSESSMENT — PAIN SCALES - GENERAL: PAINLEVEL: NO PAIN (0)

## 2017-10-24 ASSESSMENT — PATIENT HEALTH QUESTIONNAIRE - PHQ9: SUM OF ALL RESPONSES TO PHQ QUESTIONS 1-9: 14

## 2017-10-24 NOTE — PROGRESS NOTES
"  SUBJECTIVE:   Rajendra Gagnon is a 52 year old male who presents to clinic today for the following health issues:      ED/UC Followup:    Facility:  [facility] ED  Date of visit: 10/19/17  Reason for visit: Numbness of face and muscle weakness  Current Status: no facial numbness today, still fatigued and weak, currently has hives on arms, and head was itchy, bottoms of feet were itchy. Benadryl has helped.   Seen 3 times this month for palpations on 10/2, [facility] last week for chest pain /tightness, now just seen in our ER for other vague sx.  Stress with mother dying last year. Sick child - one twin has renal agenesis needs renal transplant, nephew  of murder, heavy in union at work and lots of stress at work. Brother is ill..   Took Xanax in ER - helped   Lots of somatic c/o - fears lymes/CWD after eating venison         PHQ-9 SCORE 2016 2017 10/24/2017   Total Score - - -   Total Score 4 5 14     CLAUDETTE-7 SCORE 2017 10/24/2017   Total Score 8 12           Problem list and histories reviewed & adjusted, as indicated.  Additional history: as documented        Reviewed and updated as needed this visit by clinical staff       Reviewed and updated as needed this visit by Provider         ROS:  C: NEGATIVE for fever, chills, change in weight  E/M: NEGATIVE for ear, mouth and throat problems  R: NEGATIVE for significant cough or SOB  NEURO: NEGATIVE for weakness, dizziness or paresthesias  ROS otherwise negative    OBJECTIVE:                                                    /84 (BP Location: Right arm, Patient Position: Chair, Cuff Size: Adult Regular)  Pulse 70  Temp 97.5  F (36.4  C) (Tympanic)  Ht 6' 2\" (1.88 m)  Wt 271 lb 9.6 oz (123.2 kg)  SpO2 98%  BMI 34.87 kg/m2  Body mass index is 34.87 kg/(m^2).   GENERAL: healthy, alert, well nourished, well hydrated, no distress  Skin =- possible urticarial rash - resolved mostly after benadryl  Psych- stressed. Teary eyed talking about dtr. Lots " on plate at home /work /father/brother - no rest for him      ASSESSMENT/PLAN:                                                    (F43.23) Adjustment disorder with mixed anxiety and depressed mood  (primary encounter diagnosis)  Comment: Lots of somatic c/o- w/u negative and therefore feel stress induced. Always had CLAUDETTE sx and dysthymia chronically  Plan: sertraline (ZOLOFT) 50 MG tablet, PSYCHOLOGY         REFERRAL        Risk and benefits of SSRI was discussed and verbal consent to proceed was given. Pt is open to counseling. Referral sent. F/u in 3 weeks.  Symptomatic treatment was discussed along when patient should call and/or come back into the clinic or go to ER/Urgent care. All questions answered.   Need to lighten the work load. More sleep and rest.  30 minutes was spent with patient and over 50%  of this time was spent on counseling patient regarding  illness, medication and / or treatment  options, coordinating further cares and follow ups that are needed along with resource material that will be helpful in the treatment of these issues.             See Patient Instructions    Bhupinder Mckeon MD  [facility]

## 2017-10-24 NOTE — MR AVS SNAPSHOT
After Visit Summary   10/24/2017    Rajendra Gagnon    MRN: [id number]           Patient Information     Date Of Birth          1965        Visit Information        Provider Department      10/24/2017 1:00 PM Bhupinder Mckeon MD [facility] [place]        Today's Diagnoses     Adjustment disorder with mixed anxiety and depressed mood    -  1       Follow-ups after your visit        Additional Services     PSYCHOLOGY REFERRAL       Your provider has referred you to: Counselor  Please be aware that coverage of these services is subject to the terms and limitations of your health insurance plan.  Call member services at your health plan with any benefit or coverage questions.      Please bring the following to your appointment:    >>   Any x-rays, CTs or MRIs which have been performed.  Contact the facility where they were done to arrange for  prior to your scheduled appointment.   >>   List of current medications   >>   This referral request   >>   Any documents/labs given to you for this referral                  Your next 10 appointments already scheduled     Nov 14, 2017  2:30 PM CST   (Arrive by 2:15 PM)   SHORT with Bhupinder Mckeon MD   [facility] [place] ([facility] )    [address] [place] [address] [place]  [place] [address]   [phone]              Who to contact     If you have questions or need follow up information about today's clinic visit or your schedule please contact [facility] directly at [phone].  Normal or non-critical lab and imaging results will be communicated to you by MyChart, letter or phone within 4 business days after the clinic has received the results. If you do not hear from us within 7 days, please contact the clinic through MyChart or phone. If you have a critical or abnormal lab result, we will notify you by phone as soon as possible.  Submit refill requests through [employer] or call your pharmacy and they will forward the  "refill request to us. Please allow 3 business days for your refill to be completed.          Additional Information About Your Visit        [employer] Information     [employer] gives you secure access to your electronic health record. If you see a primary care provider, you can also send messages to your care team and make appointments. If you have questions, please call your primary care clinic.  If you do not have a primary care provider, please call [phone] and they will assist you.        Care EveryWhere ID     This is your Care EveryWhere ID. This could be used by other organizations to access your [place] medical records  [id number]        Your Vitals Were     Pulse Temperature Height Pulse Oximetry BMI (Body Mass Index)       70 97.5  F (36.4  C) (Tympanic) 6' 2\" (1.88 m) 98% 34.87 kg/m2        Blood Pressure from Last 3 Encounters:   10/24/17 128/84   10/19/17 136/88   10/02/17 125/89    Weight from Last 3 Encounters:   10/24/17 271 lb 9.6 oz (123.2 kg)   10/19/17 260 lb (117.9 kg)   05/12/17 260 lb (117.9 kg)              We Performed the Following     PSYCHOLOGY REFERRAL          Today's Medication Changes          These changes are accurate as of: 10/24/17  1:43 PM.  If you have any questions, ask your nurse or doctor.               Start taking these medicines.        Dose/Directions    sertraline 50 MG tablet   Commonly known as:  ZOLOFT   Used for:  Adjustment disorder with mixed anxiety and depressed mood   Started by:  Bhupinder Mckeon MD        Take 1/2 tablet (25 mg) for 4-6 days , then increase to 1 tablet orally daily   Quantity:  30 tablet   Refills:  1            Where to get your medicines      These medications were sent to [facility] - [address]  [address] [place] [address]     Phone:  [phone]     sertraline 50 MG tablet                Primary Care Provider Office Phone # Fax #    Bhupinder Mckeon [phone]       [facility] " [facility] [address]  [facility] [address]        Equal Access to Services     MARYJOLEEN YANI : Hadii augusto corbett hadgayleo Soomaali, waaxda luqadaha, qaybta kaalmada sajiruthcesilia, niki duttasonyapacheco dumont. So [facility] [phone].    ATENCIÓN: Si habla español, tiene a olvera disposición servicios gratuitos de asistencia lingüística. Llame al [phone].    We comply with applicable federal civil rights laws and [place] laws. We do not discriminate on the basis of race, color, national origin, age, disability, sex, sexual orientation, or gender identity.            Thank you!     Thank you for choosing [facility]  for your care. Our goal is always to provide you with excellent care. Hearing back from our patients is one way we can continue to improve our services. Please take a few minutes to complete the written survey that you may receive in the mail after your visit with us. Thank you!             Your Updated Medication List - Protect others around you: Learn how to safely use, store and throw away your medicines at www.disposemymeds.org.          This list is accurate as of: 10/24/17  1:43 PM.  Always use your most recent med list.                   Brand Name Dispense Instructions for use Diagnosis    ASPIRIN PO      Take 325 mg by mouth once        BABY ASPIRIN 81 MG chewable tablet   Generic drug:  aspirin      Take 162 mg by mouth daily Reported on 5/12/2017        BENADRYL PO      Take 25 mg by mouth        calcium & magnesium carbonates 311-232 MG Tabs      Take 1 tablet by mouth daily        MULTIVITAMIN PO      Take 1 tablet by mouth daily with food.        OMEGA-3 FISH OIL PO      Take 1 g by mouth every other day        sertraline 50 MG tablet    ZOLOFT    30 tablet    Take 1/2 tablet (25 mg) for 4-6 days , then increase to 1 tablet orally daily    Adjustment disorder with mixed anxiety and depressed mood       TYLENOL PO      Take 650 mg by mouth

## 2017-10-24 NOTE — NURSING NOTE
"Chief Complaint   Patient presents with     ER F/U       Initial /84 (BP Location: Right arm, Patient Position: Chair, Cuff Size: Adult Regular)  Pulse 70  Temp 97.5  F (36.4  C) (Tympanic)  Ht 6' 2\" (1.88 m)  Wt 271 lb 9.6 oz (123.2 kg)  SpO2 98%  BMI 34.87 kg/m2 Estimated body mass index is 34.87 kg/(m^2) as calculated from the following:    Height as of this encounter: 6' 2\" (1.88 m).    Weight as of this encounter: 271 lb 9.6 oz (123.2 kg).  Medication Reconciliation: complete     Malinda Reddy     "

## 2017-10-25 ASSESSMENT — ANXIETY QUESTIONNAIRES: GAD7 TOTAL SCORE: 12

## 2017-10-30 ENCOUNTER — TELEPHONE (OUTPATIENT)
Dept: FAMILY MEDICINE | Facility: OTHER | Age: 52
End: 2017-10-30

## 2017-10-30 NOTE — TELEPHONE ENCOUNTER
12:54 PM    Reason for Call: Phone Call    Description: Pt called and stated he believes he is having an allergic reaction to his new med (Zoloft). Pt stated he started meds on Wed of last week. He developed hives on Friday and they have continued. He is also experiencing waves of warmth and tingling sensations throughout his body. Pt did not take med this morning. He has taken Benadryl for the hives and it has helped. Please call pt back at [phone] after 2:45 pm or later.     Was an appointment offered for this call? Yes, but pt has appt before first available  If yes : Appointment type              Date    Preferred method for responding to this message: Telephone Call  What is your phone number ?    If we cannot reach you directly, may we leave a detailed response at the number you provided? Yes    Can this message wait until your PCP/provider returns, if available today? Not applicable    Dagmar Noriega

## 2017-10-30 NOTE — TELEPHONE ENCOUNTER
Probably NOT zoloft -had hive when seen and before zoloft started.  Can stop zoloft for 3-5 days and see if sx resolve of warmth/tingling sensation.  Continue with antihist of claritin or zyrtec daily to BID and could add pepcid 20mg BID for hives. Can also take benadryl at night.

## 2017-10-31 NOTE — TELEPHONE ENCOUNTER
Patient notified via phone, will call when symptoms completely resolve. Does not want to try Zoloft again. Had burning sensation that started at top of head and went down arms into finger tips. Still has a little of that feeling but for most part feels better. Hives are also gone.

## 2017-11-14 ENCOUNTER — OFFICE VISIT (OUTPATIENT)
Dept: FAMILY MEDICINE | Facility: OTHER | Age: 52
End: 2017-11-14
Attending: FAMILY MEDICINE
Payer: COMMERCIAL

## 2017-11-14 ENCOUNTER — TELEPHONE (OUTPATIENT)
Dept: FAMILY MEDICINE | Facility: OTHER | Age: 52
End: 2017-11-14

## 2017-11-14 VITALS
HEART RATE: 87 BPM | HEIGHT: 74 IN | TEMPERATURE: 97.8 F | DIASTOLIC BLOOD PRESSURE: 80 MMHG | WEIGHT: 260 LBS | OXYGEN SATURATION: 95 % | SYSTOLIC BLOOD PRESSURE: 138 MMHG | BODY MASS INDEX: 33.37 KG/M2

## 2017-11-14 DIAGNOSIS — R21 FACIAL RASH: ICD-10-CM

## 2017-11-14 DIAGNOSIS — F43.23 ADJUSTMENT DISORDER WITH MIXED ANXIETY AND DEPRESSED MOOD: ICD-10-CM

## 2017-11-14 DIAGNOSIS — L50.9 HIVES: Primary | ICD-10-CM

## 2017-11-14 LAB — MISCELLANEOUS TEST: NORMAL

## 2017-11-14 PROCEDURE — 86039 ANTINUCLEAR ANTIBODIES (ANA): CPT | Mod: 90 | Performed by: FAMILY MEDICINE

## 2017-11-14 PROCEDURE — 99000 SPECIMEN HANDLING OFFICE-LAB: CPT | Performed by: FAMILY MEDICINE

## 2017-11-14 PROCEDURE — 99214 OFFICE O/P EST MOD 30 MIN: CPT | Performed by: FAMILY MEDICINE

## 2017-11-14 RX ORDER — CETIRIZINE HYDROCHLORIDE 10 MG/1
10 TABLET ORAL DAILY
Qty: 30 TABLET | Refills: 1 | Status: SHIPPED | OUTPATIENT
Start: 2017-11-14 | End: 2018-02-02

## 2017-11-14 RX ORDER — FAMOTIDINE 20 MG/1
20 TABLET, FILM COATED ORAL 2 TIMES DAILY
Qty: 60 TABLET | Refills: 1 | Status: SHIPPED | OUTPATIENT
Start: 2017-11-14 | End: 2018-03-14

## 2017-11-14 ASSESSMENT — ANXIETY QUESTIONNAIRES
5. BEING SO RESTLESS THAT IT IS HARD TO SIT STILL: SEVERAL DAYS
GAD7 TOTAL SCORE: 5
2. NOT BEING ABLE TO STOP OR CONTROL WORRYING: SEVERAL DAYS
7. FEELING AFRAID AS IF SOMETHING AWFUL MIGHT HAPPEN: SEVERAL DAYS
4. TROUBLE RELAXING: NOT AT ALL
3. WORRYING TOO MUCH ABOUT DIFFERENT THINGS: SEVERAL DAYS
6. BECOMING EASILY ANNOYED OR IRRITABLE: NOT AT ALL
1. FEELING NERVOUS, ANXIOUS, OR ON EDGE: SEVERAL DAYS

## 2017-11-14 ASSESSMENT — PATIENT HEALTH QUESTIONNAIRE - PHQ9: SUM OF ALL RESPONSES TO PHQ QUESTIONS 1-9: 7

## 2017-11-14 ASSESSMENT — PAIN SCALES - GENERAL: PAINLEVEL: NO PAIN (0)

## 2017-11-14 NOTE — MR AVS SNAPSHOT
After Visit Summary   11/14/2017    Rajendra Gagnon    MRN: [id number]           Patient Information     Date Of Birth          1965        Visit Information        Provider Department      11/14/2017 2:30 PM Bhupinder Mckeon MD [facility]        Today's Diagnoses     Hives    -  1    Adjustment disorder with mixed anxiety and depressed mood        Facial rash           Follow-ups after your visit        Additional Services     DERMATOLOGY REFERRAL       Your provider has referred you to: Dr johnson    Please be aware that coverage of these services is subject to the terms and limitations of your health insurance plan.  Call member services at your health plan with any benefit or coverage questions.      Please bring the following with you to your appointment:    (1) Any X-Rays, CTs or MRIs which have been performed.  Contact the facility where they were done to arrange for  prior to your scheduled appointment.    (2) List of current medications  (3) This referral request   (4) Any documents/labs given to you for this referral                  Your next 10 appointments already scheduled     Nov 27, 2017 12:30 PM CST   Holter Monitor with HI STRESS RM1   [facility] ([facility] )    [address]  [place] [address]   [phone]            Nov 28, 2017 10:00 AM CST   (Arrive by 9:45 AM)   New Visit with AMMON Johnson MD   [facility] ([facility] )    [address]  [place] [address]   [phone]            Nov 28, 2017 12:30 PM CST   Holter Monitor with HI STRESS RM1   [facility] ([facility] )    [address]  [place] [address]   [phone]            Nov 29, 2017 12:30 PM CST   Holter Monitor with HI STRESS RM1   [facility] ([facility] )    [address]  [place] [address]   [phone]            Dec 04, 2017  2:45 PM CST   (Arrive by 2:30 PM)  "  CONSULT with Reggie [facility],    [facility] [place] )    [address]  [place] [address]   [phone]            Dec 11, 2017  2:45 PM CST   (Arrive by 2:30 PM)   SHORT with Bhupinder Mckeon MD   [facility] ([facility] [place] )    [address]  [place] [address]   [phone]              Who to contact     If you have questions or need follow up information about today's clinic visit or your schedule please contact [facility] directly at [phone].  Normal or non-critical lab and imaging results will be communicated to you by MyChart, letter or phone within 4 business days after the clinic has received the results. If you do not hear from us within 7 days, please contact the clinic through [employer] or phone. If you have a critical or abnormal lab result, we will notify you by phone as soon as possible.  Submit refill requests through [employer] or call your pharmacy and they will forward the refill request to us. Please allow 3 business days for your refill to be completed.          Additional Information About Your Visit        [employer] Information     [employer] gives you secure access to your electronic health record. If you see a primary care provider, you can also send messages to your care team and make appointments. If you have questions, please call your primary care clinic.  If you do not have a primary care provider, please call [phone] and they will assist you.        Care EveryWhere ID     This is your Care EveryWhere ID. This could be used by other organizations to access your [place] medical records  [id number]        Your Vitals Were     Pulse Temperature Height Pulse Oximetry BMI (Body Mass Index)       87 97.8  F (36.6  C) (Tympanic) 6' 2\" (1.88 m) 95% 33.38 kg/m2        Blood Pressure from Last 3 Encounters:   11/14/17 138/80   10/24/17 128/84   10/19/17 136/88    Weight from Last 3 Encounters: "   11/14/17 260 lb (117.9 kg)   10/24/17 271 lb 9.6 oz (123.2 kg)   10/19/17 260 lb (117.9 kg)              We Performed the Following     DEPRESSION ACTION PLAN (DAP)     DERMATOLOGY REFERRAL     RYLAND TITER: Laboratory Miscellaneous Order          Today's Medication Changes          These changes are accurate as of: 11/14/17  3:44 PM.  If you have any questions, ask your nurse or doctor.               Start taking these medicines.        Dose/Directions    cetirizine 10 MG tablet   Commonly known as:  zyrTEC   Used for:  Hives   Started by:  Bhupinder Mckeon MD        Dose:  10 mg   Take 1 tablet (10 mg) by mouth daily   Quantity:  30 tablet   Refills:  1       famotidine 20 MG tablet   Commonly known as:  PEPCID   Used for:  Hives   Started by:  Bhupinder Mckeon MD        Dose:  20 mg   Take 1 tablet (20 mg) by mouth 2 times daily   Quantity:  60 tablet   Refills:  1            Where to get your medicines      These medications [facility] [place] [facility] [address]  [address] [place] [address]     Phone:  [phone]     cetirizine 10 MG tablet    famotidine 20 MG tablet                Primary Care Provider Office Phone # Fax #    Bhupinder Mckeon [phone]       [facility] [address]  [facility] [address]        Equal Access to Services     BUNNY LOMELI AH: Hadii aad ku hadasho Soomaali, waaxda luqadaha, qaybta kaalmada adeegyada, niki tipton . So [facility] [phone].    ATENCIÓN: Si habla español, tiene a olvera disposición servicios gratuitos de asistencia lingüística. Marin al [phone].    We comply with applicable federal civil rights laws and [place] laws. We do not discriminate on the basis of race, color, national origin, age, disability, sex, sexual orientation, or gender identity.            Thank you!     Thank you for choosing [facility]  for your care. Our goal is always to provide you with  excellent care. Hearing back from our patients is one way we can continue to improve our services. Please take a few minutes to complete the written survey that you may receive in the mail after your visit with us. Thank you!             Your Updated Medication List - Protect others around you: Learn how to safely use, store and throw away your medicines at www.disposemymeds.org.          This list is accurate as of: 11/14/17  3:44 PM.  Always use your most recent med list.                   Brand Name Dispense Instructions for use Diagnosis    ASPIRIN PO      Take 325 mg by mouth once        BABY ASPIRIN 81 MG chewable tablet   Generic drug:  aspirin      Take 162 mg by mouth daily Reported on 5/12/2017        BENADRYL PO      Take 25 mg by mouth        calcium & magnesium carbonates 311-232 MG Tabs      Take 1 tablet by mouth daily        cetirizine 10 MG tablet    zyrTEC    30 tablet    Take 1 tablet (10 mg) by mouth daily    Hives       famotidine 20 MG tablet    PEPCID    60 tablet    Take 1 tablet (20 mg) by mouth 2 times daily    Hives       MULTIVITAMIN PO      Take 1 tablet by mouth daily with food.        OMEGA-3 FISH OIL PO      Take 1 g by mouth every other day        sertraline 50 MG tablet    ZOLOFT    30 tablet    Take 1/2 tablet (25 mg) for 4-6 days , then increase to 1 tablet orally daily    Adjustment disorder with mixed anxiety and depressed mood       TYLENOL PO      Take 650 mg by mouth

## 2017-11-14 NOTE — NURSING NOTE
"Chief Complaint   Patient presents with     Depression     F/U     Derm Problem       Initial /80 (BP Location: Right arm, Patient Position: Sitting, Cuff Size: Adult Large)  Pulse 87  Temp 97.8  F (36.6  C) (Tympanic)  Ht 6' 2\" (1.88 m)  Wt 260 lb (117.9 kg)  SpO2 95%  BMI 33.38 kg/m2 Estimated body mass index is 33.38 kg/(m^2) as calculated from the following:    Height as of this encounter: 6' 2\" (1.88 m).    Weight as of this encounter: 260 lb (117.9 kg).  Medication Reconciliation: complete     Orestes Welch    "

## 2017-11-14 NOTE — PROGRESS NOTES
SUBJECTIVE:   Rajendra Gagnon is a 52 year old male who presents to clinic today for the following health issues:      Depression and Anxiety Follow-Up/  ZOLOFT    Status since last visit: No change    Other associated symptoms:None    Complicating factors:     Significant life event: Yes-  Mom passed away last yr,Daughter needs a kidney transplant     Current substance abuse: None    Feel better - not taking any meds    Zoloft - did not feel well- flush feeling in face to arms down to hands- started 3-4 days into it         PHQ-9 Score and MyChart F/U Questions 5/11/2016 5/12/2017 10/24/2017   Total Score 4 5 14   Q9: Suicide Ideation Not at all Not at all Not at all     CLAUDETTE-7 SCORE 5/12/2017 10/24/2017   Total Score 8 12       PHQ-9  English  PHQ-9   Any Language  GAD7  Suicide Assessment Five-step Evaluation and Treatment (SAFE-T)      Amount of exercise or physical activity: 6-7 days/week for an average of greater than 60 minutes    Problems taking medications regularly: No    Medication side effects: Stopped taking ZOLOFT tightness in chest itching everywhere, felt like waves of hot lava going through body    Diet: regular (no restrictions)        Rash/ Hives      Duration: 10/26/17    Description (location/character/radiation): all over body    Intensity:  mild    Accompanying signs and symptoms: itching, hives    History (similar episodes/previous evaluation): None    Precipitating or alleviating factors: None    Therapies tried and outcome: Stopped taking Zoloft, has been using  Benadryl it has been helping         Problem list and histories reviewed & adjusted, as indicated.  Additional history: as documented    Labs reviewed in EPIC    Reviewed and updated as needed this visit by clinical staffTobacco  Allergies  Meds       Reviewed and updated as needed this visit by Provider         ROS:  C: NEGATIVE for fever, chills, change in weight  E/M: NEGATIVE for ear, mouth and throat problems  R: NEGATIVE  "for significant cough or SOB    OBJECTIVE:                                                    /80 (BP Location: Right arm, Patient Position: Sitting, Cuff Size: Adult Large)  Pulse 87  Temp 97.8  F (36.6  C) (Tympanic)  Ht 6' 2\" (1.88 m)  Wt 260 lb (117.9 kg)  SpO2 95%  BMI 33.38 kg/m2  Body mass index is 33.38 kg/(m^2).   GENERAL: healthy, alert, well nourished, well hydrated, no distress  SKIN: butterfly rash on face, few hive like rash on hands   PSYCH: Alert and oriented times 3; speech- coherent , normal rate and volume; able to articulate logical thoughts, able to abstract reason, no tangential thoughts, no hallucinations or delusions, affect- little less depressed / anxiety still present           ASSESSMENT/PLAN:                                                      (L50.9) Hives  (primary encounter diagnosis)  Comment: still persistant -- etiology unclear and discussed. Could even be stress induced.  Pt has been treating prn- will schedule below for H1-2 block.   Plan: cetirizine (ZYRTEC) 10 MG tablet, famotidine         (PEPCID) 20 MG tablet        Symptomatic treatment was discussed along when patient should call and/or come back into the clinic or go to ER/Urgent care. All questions answered.     (F43.23) Adjustment disorder with mixed anxiety and depressed mood  Comment: discussed - options given. Getting set up with tel # for counselors.   Plan: DEPRESSION ACTION PLAN (DAP)        Pt is going to retry Zoloft-- question true reaction. R/B of trying zoloft vs trying another agent fully discussed. Pt wants to try zoloft     (R21) Facial rash  Comment: etiology unclear. Classic butterfly facial rash. Lupus with dtr. Will check RYLAND again and send to derm   Plan: DERMATOLOGY REFERRAL, RYLAND TITER: Laboratory         Miscellaneous Order                  Bhupinder Mckeon MD  [facility] [place]  "

## 2017-11-14 NOTE — TELEPHONE ENCOUNTER
Pt needs a new order [id number] for the 48 Holter the date expires on the 11/19/17 and the pt is having it done on 11/27/17. Please redo order

## 2017-11-15 ASSESSMENT — ANXIETY QUESTIONNAIRES: GAD7 TOTAL SCORE: 5

## 2017-11-17 LAB
RESULT: NORMAL
SEND OUTS MISC TEST CODE: NORMAL
SEND OUTS MISC TEST SPECIMEN: NORMAL
TEST NAME: NORMAL

## 2017-11-27 ENCOUNTER — HOSPITAL ENCOUNTER (OUTPATIENT)
Dept: CARDIOLOGY | Facility: HOSPITAL | Age: 52
Discharge: HOME | End: 2017-11-27
Attending: FAMILY MEDICINE | Admitting: FAMILY MEDICINE
Payer: COMMERCIAL

## 2017-11-27 DIAGNOSIS — R00.2 PALPITATIONS: ICD-10-CM

## 2017-11-27 PROCEDURE — 93226 XTRNL ECG REC<48 HR SCAN A/R: CPT | Performed by: FAMILY MEDICINE

## 2017-11-28 ENCOUNTER — HOSPITAL ENCOUNTER (OUTPATIENT)
Dept: CARDIOLOGY | Facility: HOSPITAL | Age: 52
End: 2017-11-28
Attending: FAMILY MEDICINE
Payer: COMMERCIAL

## 2017-11-28 ENCOUNTER — OFFICE VISIT (OUTPATIENT)
Dept: DERMATOLOGY | Facility: OTHER | Age: 52
End: 2017-11-28
Attending: FAMILY MEDICINE
Payer: COMMERCIAL

## 2017-11-28 VITALS
TEMPERATURE: 98.4 F | HEART RATE: 85 BPM | WEIGHT: 260 LBS | DIASTOLIC BLOOD PRESSURE: 80 MMHG | SYSTOLIC BLOOD PRESSURE: 112 MMHG | BODY MASS INDEX: 33.37 KG/M2 | OXYGEN SATURATION: 98 % | HEIGHT: 74 IN | RESPIRATION RATE: 20 BRPM

## 2017-11-28 DIAGNOSIS — L28.0 LICHEN SIMPLEX CHRONICUS: ICD-10-CM

## 2017-11-28 DIAGNOSIS — R21 FACIAL RASH: ICD-10-CM

## 2017-11-28 DIAGNOSIS — L71.9 ACNE ROSACEA: Primary | ICD-10-CM

## 2017-11-28 PROCEDURE — 99202 OFFICE O/P NEW SF 15 MIN: CPT | Performed by: DERMATOLOGY

## 2017-11-28 RX ORDER — MINOCYCLINE HYDROCHLORIDE 100 MG/1
CAPSULE ORAL
Qty: 28 CAPSULE | Refills: 3 | Status: SHIPPED | OUTPATIENT
Start: 2017-11-28 | End: 2019-01-29

## 2017-11-28 RX ORDER — METRONIDAZOLE 7.5 MG/G
GEL TOPICAL
Qty: 45 G | Refills: 3 | Status: SHIPPED | OUTPATIENT
Start: 2017-11-28 | End: 2022-02-16

## 2017-11-28 ASSESSMENT — PAIN SCALES - GENERAL: PAINLEVEL: NO PAIN (0)

## 2017-11-28 NOTE — NURSING NOTE
"Chief Complaint   Patient presents with     Rash     facial rash and 2 rash area on left lower leg       Initial /80  Pulse 85  Temp 98.4  F (36.9  C) (Tympanic)  Resp 20  Ht 1.88 m (6' 2\")  Wt 117.9 kg (260 lb)  SpO2 98%  BMI 33.38 kg/m2 Estimated body mass index is 33.38 kg/(m^2) as calculated from the following:    Height as of this encounter: 1.88 m (6' 2\").    Weight as of this encounter: 117.9 kg (260 lb).  Medication Reconciliation: complete   Xin Rucker LPN      "

## 2017-11-28 NOTE — MR AVS SNAPSHOT
After Visit Summary   11/28/2017    Rajendra Gagnon    MRN: [id number]           Patient Information     Date Of Birth          1965        Visit Information        Provider Department      11/28/2017 10:00 AM AMMON Johnson MD [facility]        Today's Diagnoses     Acne rosacea    -  1    Facial rash        Lichen simplex chronicus           Follow-ups after your visit        Your next 10 appointments already scheduled     Dec 11, 2017  2:45 PM CST   (Arrive by 2:30 PM)   SHORT with Bhupinder Mckeon MD   [facility] [place] ([facility] [place] )    [address]  [place] [address]   [phone]              Who to contact     If you have questions or need follow up information about today's clinic visit or your schedule please contact [facility] directly at [phone].  Normal or non-critical lab and imaging results will be communicated to you by MyChart, letter or phone within 4 business days after the clinic has received the results. If you do not hear from us within 7 days, please contact the clinic through MyChart or phone. If you have a critical or abnormal lab result, we will notify you by phone as soon as possible.  Submit refill requests through [employer] or call your pharmacy and they will forward the refill request to us. Please allow 3 business days for your refill to be completed.          Additional Information About Your Visit        MyChart Information     [employer] gives you secure access to your electronic health record. If you see a primary care provider, you can also send messages to your care team and make appointments. If you have questions, please call your primary care clinic.  If you do not have a primary care provider, please call [phone] and they will assist you.        Care EveryWhere ID     This is your Care EveryWhere ID. This could be used by other organizations to access your [facility]  "records  [id number]        Your Vitals Were     Pulse Temperature Respirations Height Pulse Oximetry BMI (Body Mass Index)    85 98.4  F (36.9  C) (Tympanic) 20 1.88 m (6' 2\") 98% 33.38 kg/m2       Blood Pressure from Last 3 Encounters:   12/04/17 116/74   11/28/17 112/80   11/14/17 138/80    Weight from Last 3 Encounters:   12/04/17 117.9 kg (260 lb)   11/28/17 117.9 kg (260 lb)   11/14/17 117.9 kg (260 lb)              Today, you had the following     No orders found for display         Today's Medication Changes          These changes are accurate as of: 11/28/17 11:59 PM.  If you have any questions, ask your nurse or doctor.               Start taking these medicines.        Dose/Directions    metroNIDAZOLE 0.75 % topical gel   Commonly known as:  METROGEL   Used for:  Acne rosacea, Lichen simplex chronicus   Started by:  AMMON Johnson MD        ,Apply to redness of the face morning and bedtime   Quantity:  45 g   Refills:  3       minocycline 100 MG capsule   Commonly known as:  MINOCIN/DYNACIN   Used for:  Acne rosacea   Started by:  AMMON Johnson MD        One capsule h.s. For rosacea   Quantity:  28 capsule   Refills:  3            Where to get your medicines      These medications were sent to [facility] - [place] - [address]  [address] [place] [address]     Phone:  [phone]     metroNIDAZOLE 0.75 % topical gel    minocycline 100 MG capsule                Primary Care Provider Office Phone # Fax #    Bhupinder Mckeon [phone]       [facility] [address]  [place] [address]        Equal Access to Services     JOLEEN LOMELI AH: Josefa Villegas, blaise rashid, hector fuentes, niki dumont. So [facility] [phone].    ATENCIÓN: Si habla español, tiene a olvera disposición servicios gratuitos de asistencia lingüística. Llame al [phone].    We comply with applicable federal civil " rights laws and [place] laws. We do not discriminate on the basis of race, color, national origin, age, disability, sex, sexual orientation, or gender identity.            Thank you!     [facility] you [facility]  [facility] your [facility]. Our goal is always to provide you with excellent care. Hearing back from our patients is one way we can continue to improve our services. Please take a few minutes to complete the written survey that you may receive in the mail after your visit with us. Thank you!             Your Updated Medication List - Protect others around you: Learn how to safely use, store and throw away your medicines at www.disposemymeds.org.          This list is accurate as of: 11/28/17 11:59 PM.  Always use your most recent med list.                   Brand Name Dispense Instructions for use Diagnosis    ASPIRIN PO      Take 325 mg by mouth once        BABY ASPIRIN 81 MG chewable tablet   Generic drug:  aspirin      Take 162 mg by mouth daily Reported on 5/12/2017        BENADRYL PO      Take 25 mg by mouth        calcium & magnesium carbonates 311-232 MG Tabs      Take 1 tablet by mouth daily        cetirizine 10 MG tablet    zyrTEC    30 tablet    Take 1 tablet (10 mg) by mouth daily    Hives       famotidine 20 MG tablet    PEPCID    60 tablet    Take 1 tablet (20 mg) by mouth 2 times daily    Hives       metroNIDAZOLE 0.75 % topical gel    METROGEL    45 g    ,Apply to redness of the face morning and bedtime    Acne rosacea, Lichen simplex chronicus       minocycline 100 MG capsule    MINOCIN/DYNACIN    28 capsule    One capsule h.s. For rosacea    Acne rosacea       MULTIVITAMIN PO      Take 1 tablet by mouth daily with food.        OMEGA-3 FISH OIL PO      Take 1 g by mouth every other day        sertraline 50 MG tablet    ZOLOFT    30 tablet    Take 1/2 tablet (25 mg) for 4-6 days , then increase to 1 tablet orally daily    Adjustment disorder with mixed anxiety and depressed mood        TYLENOL PO      Take 650 mg by mouth

## 2017-11-29 ENCOUNTER — HOSPITAL ENCOUNTER (OUTPATIENT)
Dept: CARDIOLOGY | Facility: HOSPITAL | Age: 52
End: 2017-11-29
Attending: FAMILY MEDICINE
Payer: COMMERCIAL

## 2017-11-29 NOTE — CONSULTS
DATE OF VISIT:  2017      SUBJECTIVE:  Rajendra Billings comes in because of a rash on his face and some patches on his legs that have never quite gone away.  He has been studied for lupus in that the patient's facial rash is a butterfly configuration.       OBJECTIVE:  Examination shows on his face numerous bright red papules and a flared type of erythema  consistent with acne rosacea. The  papules are present on his forehead and on the central malar areas. No complaints of any oral lesions.     . This appears to be classic acne rosacea of the standard type. Systemic lupus may have the same location of erythema but    it is not papular and does not have this intensity that we see today.      On his leg are 2 patches of what appears to be lichen simplex eczema - very localized lesions  of thickening and fissuring , probably due to rubbing and scratching. Overall, his skin other than the face and those 2 areas - is  healthy.        ASSESSMENT:  Acne rosacea.  Classic and lichen simplex chronicus.       PLAN:     1.  For the acne rosacea, MetroGel 0.75 morning and night.   2.  Minocycline 100 mg a day for at least 2 months, then discontinue if possible.     3.  For the lichen simplex I injected the areas  with 10 mg cc triamcinolone.  Advised that he only use moisturizers on these spots.   4.  Return in 2-3 months to see how things are going.     5.  Meds and allergies reviewed.         AMMON KAUFMAN MD             D: 2017 15:44   T: 2017 18:44   MT: MARCUS      Name:     RAJENDRA BILLINGS   MRN:      [id number]        Account:       [id number]   :      1965           Consult Date:  2017      Document: [id number]

## 2017-12-04 ENCOUNTER — OFFICE VISIT (OUTPATIENT)
Dept: SURGERY | Facility: OTHER | Age: 52
End: 2017-12-04
Attending: FAMILY MEDICINE
Payer: COMMERCIAL

## 2017-12-04 VITALS
HEART RATE: 83 BPM | BODY MASS INDEX: 33.37 KG/M2 | DIASTOLIC BLOOD PRESSURE: 74 MMHG | RESPIRATION RATE: 18 BRPM | OXYGEN SATURATION: 97 % | WEIGHT: 260 LBS | TEMPERATURE: 98.4 F | SYSTOLIC BLOOD PRESSURE: 116 MMHG | HEIGHT: 74 IN

## 2017-12-04 DIAGNOSIS — Z12.11 SPECIAL SCREENING FOR MALIGNANT NEOPLASMS, COLON: ICD-10-CM

## 2017-12-04 DIAGNOSIS — K42.9 UMBILICAL HERNIA WITHOUT OBSTRUCTION AND WITHOUT GANGRENE: ICD-10-CM

## 2017-12-04 DIAGNOSIS — E66.09 CLASS 1 OBESITY DUE TO EXCESS CALORIES WITHOUT SERIOUS COMORBIDITY WITH BODY MASS INDEX (BMI) OF 33.0 TO 33.9 IN ADULT: ICD-10-CM

## 2017-12-04 DIAGNOSIS — R06.02 SOB (SHORTNESS OF BREATH): Primary | ICD-10-CM

## 2017-12-04 DIAGNOSIS — E66.811 CLASS 1 OBESITY DUE TO EXCESS CALORIES WITHOUT SERIOUS COMORBIDITY WITH BODY MASS INDEX (BMI) OF 33.0 TO 33.9 IN ADULT: ICD-10-CM

## 2017-12-04 PROCEDURE — 99243 OFF/OP CNSLTJ NEW/EST LOW 30: CPT | Performed by: SURGERY

## 2017-12-04 RX ORDER — SODIUM, POTASSIUM,MAG SULFATES 17.5-3.13G
2 SOLUTION, RECONSTITUTED, ORAL ORAL SEE ADMIN INSTRUCTIONS
Qty: 2 BOTTLE | Refills: 0 | Status: ON HOLD | OUTPATIENT
Start: 2017-12-04 | End: 2018-01-03

## 2017-12-04 ASSESSMENT — PAIN SCALES - GENERAL: PAINLEVEL: NO PAIN (0)

## 2017-12-04 NOTE — PROGRESS NOTES
Surgery Consult Clinic Note      RE: Rajendra Gagnon  : 1965    Chief Complaint:  colonoscopy    History of Present Illness:  Mr. Gagnon is a very pleasant 52 year old male who I am seeing at the request of Dr. Bhupinder Mckeon MD for evaluation of screening colon malignant neoplasm and consideration for colonoscopy.  He denies family history of colon or rectal cancer, blood in stool, changes in bowel habits, weight loss, abdominal pain. Abdominal surgeries include inguinal hernia surgery.  He's been to the ER a couple of times for SOB and brings this up with me today.  He's afraid that he shouldn't be having any procedures with anesthesia while having this problem.  I see he's recently had a Holter monitor test  He specifically denies fever, chills, nausea, vomiting, chest pain, shortness of breath or palpitations.      Medical history:  Past Medical History:   Diagnosis Date     Dysthymia 3/26/2012     Hearing loss in right ear 3/14/2012     History of atrial fibrillation 2012     Hypertriglyceridemia      Right acoustic neuroma 3/14/2012     Tinnitus, unspecified 3/14/2012     Umbilical hernia without obstruction and without gangrene 2016     Varicose veins      Varicose veins of both lower extremities 2017       Surgical history:  Past Surgical History:   Procedure Laterality Date     acustic neuroma removed  3/2007    right     funnel chest[      states had chest reconstruction     HERNIA REPAIR       ORTHOPEDIC SURGERY      right knee surgery     PE TUBES         Family history:  Family History   Problem Relation Age of Onset     DIABETES Mother      type 2     Hypertension Mother      HEART DISEASE Mother      Lipids Mother      Alcohol/Drug Father      CANCER Maternal Grandmother      Eye Disorder Maternal Grandfather      HEART DISEASE Maternal Grandfather      CANCER Maternal Grandfather      CANCER Paternal Grandmother      Prostate Cancer Paternal Grandfather      Other - See  Comments Daughter      lupus     KIDNEY DISEASE Daughter      KIDNEY DISEASE Daughter        Medications:  Current Outpatient Prescriptions   Medication Sig Dispense Refill     metroNIDAZOLE (METROGEL) 0.75 % topical gel ,Apply to redness of the face morning and bedtime 45 g 3     minocycline (MINOCIN/DYNACIN) 100 MG capsule One capsule h.s. For rosacea 28 capsule 3     cetirizine (ZYRTEC) 10 MG tablet Take 1 tablet (10 mg) by mouth daily 30 tablet 1     famotidine (PEPCID) 20 MG tablet Take 1 tablet (20 mg) by mouth 2 times daily 60 tablet 1     sertraline (ZOLOFT) 50 MG tablet Take 1/2 tablet (25 mg) for 4-6 days , then increase to 1 tablet orally daily 30 tablet 1     ASPIRIN PO Take 325 mg by mouth once       DiphenhydrAMINE HCl (BENADRYL PO) Take 25 mg by mouth       Acetaminophen (TYLENOL PO) Take 650 mg by mouth       Omega-3 Fatty Acids (OMEGA-3 FISH OIL PO) Take 1 g by mouth every other day       calcium & magnesium carbonates 311-232 MG TABS Take 1 tablet by mouth daily        aspirin (BABY ASPIRIN) 81 MG chewable tablet Take 162 mg by mouth daily Reported on 5/12/2017       Multiple Vitamins-Minerals (MULTIVITAMIN OR) Take 1 tablet by mouth daily with food.         Allergies:  The patientis allergic to aspartame and venlafaxine.  .  Social history:  Social History   Substance Use Topics     Smoking status: Never Smoker     Smokeless tobacco: Never Used     Alcohol use 2.0 oz/week     4 Cans of beer per week      Comment: rarely     Marital status: .    Review of Systems:    Constitutional: Negative for fever, chills and weight loss.   HENT: Negative for ear pain, nosebleeds, congestion, sore throat, tinnitus and ear discharge.    Eyes: Negative for blurred vision, double vision, photophobia and pain.   Respiratory: Negative for cough, hemoptysis, shortness of breath, wheezing and stridor.    Cardiovascular: Negative for chest pain, palpitations and orthopnea.   Gastrointestinal: Per  "HPI  Genitourinary: Negative for urgency, frequency and hematuria.   Musculoskeletal: Negative for myalgias, back pain and joint pain.   Neurological: Negative for tingling, speech change and headaches.   Endo/Heme/Allergies: Does not bruise/bleed easily.   Psychiatric/Behavioral: Negative for depression, suicidal ideas and hallucinations. The patient is not nervous/anxious.    Physical Examination:  /74  Pulse 83  Temp 98.4  F (36.9  C) (Tympanic)  Resp 18  Ht 1.88 m (6' 2\")  Wt 117.9 kg (260 lb)  SpO2 97%  BMI 33.38 kg/m2  General: AAOx4, NAD, WN/WD, ambulating without assistance  HEENT:NCAT, EOMI, PERRL Sclerae anicteric; Trachea mideline, no JVD  Chest:   Clear to auscultation bilaterally.  Cardiac: S1S2 , regular rate and rhythm without additional sounds  Abdomen: Obese, soft, ND/NT no rebound, no guarding, reducible mass superior to the umbilicus   Extremities: Cursory exam unremarkable.  Skin: Warm, dry, < 2 sec cap refill  Neuro: CN 2-12 grossly intact, no focal deficit, GCS 15  Psych: happy, calm, asks appropriate questions      Assessment/Plan:  #1 Screening colon malignant neoplasm  #2 SOB  #3 Umbilical hernia  #4 Obesity    Thank you for the consult.  Mr. Gagnon and I had a long and angy discussion about colonoscopies.  The indications, risks, benefits, althernatives and technical aspects of whole colon colonoscopy were outlined with risks including, but not limited to, perforation, bleeding and inability to visualize entire colon.  Management of each was reviewed including the risk for life saving surgery and possible admittance to the ICU.  The need of mechanical preparation of the colon was reviewed along with the use of monitored anesthetic care which is needed to ensure proper visualization and safety concerns should biopsy be needed.  The patient's questions were asked and answered.  Scheduled first available date. Because of the recent cardiac work up and the SOB complaint, I'd like " him to be clear for anesthesia once all his testing is complete by his primary        Dr Ron  [facility]  [address], [address]  [place]    [address]    Referring Provider:  Bhupinder Mckeon MD  [address]  [place] [address]     Primary Care Provider:  Bhupinder Mckeon

## 2017-12-04 NOTE — MR AVS SNAPSHOT
After Visit Summary   12/4/2017    Rajendra Gagnon    MRN: [id number]           Patient Information     Date Of Birth          1965        Visit Information        Provider Department      12/4/2017 2:45 PM Reggie Ron, DO [facility]        Today's Diagnoses     SOB (shortness of breath)    -  1    Special screening for malignant neoplasms, colon        Umbilical hernia without obstruction and without gangrene        Class 1 obesity due to excess calories without serious comorbidity with body mass index (BMI) of 33.0 to 33.9 in adult          Care Instructions    Thank you for allowing Dr. Ron  and our surgical team to participate in your care.  If you have a scheduling or an appointment question please contact Carine our Health Unit Coordinator at her direct line [phone]. [place]- [phone]      You are scheduled for a: Colonoscopy   Your procedure date is:     You need a friend or family member available to drive you home AND stay with you for 24 hours after you leave the hospital. You will not be allowed to drive yourself. IF you need to take a taxi or the bus you MUST have a responsible person to ride with you. YOUR PROCEDURE WILL BE CANCELLED IF YOU DO NOT HAVE A RESPONSIBLE ADULT TO DRIVE YOU HOME.       You CANNOT have anything to eat or drink after midnight the night before your surgery, ncluding water and coffee. Your stomach needs to be completely empty. Do NOT chew gum, suck on hard candy, or smoke. You can brush your teeth the morning of surgery.       You need to call our Surgery Education Nurses 1-2 weeks prior to your surgery date at  [phone] or toll free [phone]. Please have you medication and allergy lists ready.      Stop your aspirin or other NSAIDs(Ibuprofen, Motrin, Aleve, Celebrex, Naproxen, etc...) 7 days before your surgery.      Hospital admitting will call you the day before your surgery with your arrival time. If you are  scheduled on a Monday admitting will call you the Friday before.      Please call your primary care physician if you should become ill within 24 hours of scheduled surgery. (ex.vomiting, diarrhea, fever)  Day of surgery hold all medications  Unless otherwise instructed by surgery education when they do their preop call withy you. Once you arrive home in which you may resume all medications like normal.          Follow-ups after your visit        Your next 10 appointments already scheduled     Dec 11, 2017  2:45 PM CST   (Arrive by 2:30 PM)   SHORT with Bhupinder Mckeon MD   [facility] [place] ([facility] )    [address]  [place] [address]   [phone]              Who to contact     If you have questions or need follow up information about today's clinic visit or your schedule please contact [facility] directly at [phone].  Normal or non-critical lab and imaging results will be communicated to you by MyChart, letter or phone within 4 business days after the clinic has received the results. If you do not hear from us within 7 days, please contact the clinic through MyChart or phone. If you have a critical or abnormal lab result, we will notify you by phone as soon as possible.  Submit refill requests through [employer] or call your pharmacy and they will forward the refill request to us. Please allow 3 business days for your refill to be completed.          Additional Information About Your Visit        MyChart Information     [employer] gives you secure access to your electronic health record. If you see a primary care provider, you can also send messages to your care team and make appointments. If you have questions, please call your primary care clinic.  If you do not have a primary care provider, please call [phone] and they will assist you.        Care EveryWhere ID     This is your Care EveryWhere ID. This could be used by other organizations to access your  "[facility] medical records  [id number]        Your Vitals Were     Pulse Temperature Respirations Height Pulse Oximetry BMI (Body Mass Index)    83 98.4  F (36.9  C) (Tympanic) 18 6' 2\" (1.88 m) 97% 33.38 kg/m2       Blood Pressure from Last 3 Encounters:   12/04/17 116/74   11/28/17 112/80   11/14/17 138/80    Weight from Last 3 Encounters:   12/04/17 260 lb (117.9 kg)   11/28/17 260 lb (117.9 kg)   11/14/17 260 lb (117.9 kg)              Today, you had the following     No orders found for display         Today's Medication Changes          These changes are accurate as of: 12/4/17  3:29 PM.  If you have any questions, ask your nurse or doctor.               Stop taking these medicines if you haven't already. Please contact your care team if you have questions.     sertraline 50 MG tablet   Commonly known as:  ZOLOFT   Stopped by:  Reggie Ron, DO                    Primary Care Provider Office Phone # Fax #    Bhupinder Mckeon [phone]       [facility] [address] [address]        Equal Access to Services     JOLEEN LOMELI AH: Hadii augusto corbett hadasho Soomaali, waaxda luqadaha, qaybta kaalmada adeegyada, niki dumont. So [facility] [phone].    ATENCIÓN: Si habla español, tiene a olvera disposición servicios gratuitos de asistencia lingüística. Llame al [phone].    We comply with applicable federal civil rights laws and [place] laws. We do not discriminate on the basis of race, color, national origin, age, disability, sex, sexual orientation, or gender identity.            Thank you!     Thank you for choosing [facility]  for your care. Our goal is always to provide you with excellent care. Hearing back from our patients is one way we can continue to improve our services. Please take a few minutes to complete the written survey that you may receive in the mail after your visit with us. Thank you!             Your Updated " Medication List - Protect others around you: Learn how to safely use, store and throw away your medicines at www.disposemymeds.org.          This list is accurate as of: 12/4/17  3:29 PM.  Always use your most recent med list.                   Brand Name Dispense Instructions for use Diagnosis    ASPIRIN PO      Take 325 mg by mouth once        BABY ASPIRIN 81 MG chewable tablet   Generic drug:  aspirin      Take 162 mg by mouth daily Reported on 5/12/2017        BENADRYL PO      Take 25 mg by mouth        calcium & magnesium carbonates 311-232 MG Tabs      Take 1 tablet by mouth daily        CALCIUM-MAGNESIUM-ZINC PO      Take 1 tablet by mouth every other day        cetirizine 10 MG tablet    zyrTEC    30 tablet    Take 1 tablet (10 mg) by mouth daily    Hives       famotidine 20 MG tablet    PEPCID    60 tablet    Take 1 tablet (20 mg) by mouth 2 times daily    Hives       metroNIDAZOLE 0.75 % topical gel    METROGEL    45 g    ,Apply to redness of the face morning and bedtime    Acne rosacea, Lichen simplex chronicus       minocycline 100 MG capsule    MINOCIN/DYNACIN    28 capsule    One capsule h.s. For rosacea    Acne rosacea       MULTIVITAMIN PO      Take 1 tablet by mouth daily with food.        OMEGA-3 FISH OIL PO      Take 1 g by mouth every other day        TYLENOL PO      Take 650 mg by mouth        VITAMIN D (CHOLECALCIFEROL) PO      Take 4,000 Units by mouth daily

## 2017-12-04 NOTE — NURSING NOTE
"Chief Complaint   Patient presents with     Consult     first colonoscopy/ no family history of colon cancer/ no changes in bowels       Initial /74  Pulse 83  Temp 98.4  F (36.9  C) (Tympanic)  Resp 18  Ht 6' 2\" (1.88 m)  Wt 260 lb (117.9 kg)  SpO2 97%  BMI 33.38 kg/m2 Estimated body mass index is 33.38 kg/(m^2) as calculated from the following:    Height as of this encounter: 6' 2\" (1.88 m).    Weight as of this encounter: 260 lb (117.9 kg).  Medication Reconciliation: complete     Sasha Tejada LPN    "

## 2017-12-04 NOTE — PATIENT INSTRUCTIONS
Thank you for allowing Dr. Ron  and our surgical team to participate in your care.  If you have a scheduling or an appointment question please contact Carine [facility] Health Unit Coordinator at her direct line [phone]. Malinda- [phone]      You are scheduled for a: Colonoscopy   Your procedure date is:     You need a friend or family member available to drive you home AND stay with you for 24 hours after you leave the hospital. You will not be allowed to drive yourself. IF you need to take a taxi or the bus you MUST have a responsible person to ride with you. YOUR PROCEDURE WILL BE CANCELLED IF YOU DO NOT HAVE A RESPONSIBLE ADULT TO DRIVE YOU HOME.       You CANNOT have anything to eat or drink after midnight the night before your surgery, ncluding water and coffee. Your stomach needs to be completely empty. Do NOT chew gum, suck on hard candy, or smoke. You can brush your teeth the morning of surgery.       You need to call our Surgery Education Nurses 1-2 weeks prior to your surgery date at  [phone] or toll free [phone]. Please have you medication and allergy lists ready.      Stop your aspirin or other NSAIDs(Ibuprofen, Motrin, Aleve, Celebrex, Naproxen, etc...) 7 days before your surgery.      Hospital admitting will call you the day before your surgery with your arrival time. If you are scheduled on a Monday admitting will call you the Friday before.      Please call your primary care physician if you should become ill within 24 hours of scheduled surgery. (ex.vomiting, diarrhea, fever)  Day of surgery hold all medications  Unless otherwise instructed by surgery education when they do their preop call withy you. Once you arrive home in which you may resume all medications like normal.

## 2017-12-19 ENCOUNTER — OFFICE VISIT (OUTPATIENT)
Dept: FAMILY MEDICINE | Facility: OTHER | Age: 52
End: 2017-12-19
Attending: FAMILY MEDICINE
Payer: COMMERCIAL

## 2017-12-19 ENCOUNTER — RADIANT APPOINTMENT (OUTPATIENT)
Dept: GENERAL RADIOLOGY | Facility: OTHER | Age: 52
End: 2017-12-19
Attending: FAMILY MEDICINE
Payer: COMMERCIAL

## 2017-12-19 VITALS
HEIGHT: 74 IN | WEIGHT: 260 LBS | SYSTOLIC BLOOD PRESSURE: 120 MMHG | BODY MASS INDEX: 33.37 KG/M2 | DIASTOLIC BLOOD PRESSURE: 72 MMHG | HEART RATE: 74 BPM | TEMPERATURE: 97.6 F

## 2017-12-19 DIAGNOSIS — Z01.818 PREOP GENERAL PHYSICAL EXAM: ICD-10-CM

## 2017-12-19 DIAGNOSIS — R06.02 SOB (SHORTNESS OF BREATH): ICD-10-CM

## 2017-12-19 DIAGNOSIS — Z01.818 PRE-OPERATIVE EXAMINATION: ICD-10-CM

## 2017-12-19 DIAGNOSIS — Z01.818 PRE-OPERATIVE GENERAL PHYSICAL EXAMINATION: Primary | ICD-10-CM

## 2017-12-19 DIAGNOSIS — Z12.11 ENCOUNTER FOR SCREENING COLONOSCOPY: ICD-10-CM

## 2017-12-19 DIAGNOSIS — Z12.11 SPECIAL SCREENING FOR MALIGNANT NEOPLASMS, COLON: ICD-10-CM

## 2017-12-19 LAB
ANION GAP SERPL CALCULATED.3IONS-SCNC: 6 MMOL/L (ref 3–14)
BASOPHILS # BLD AUTO: 0 10E9/L (ref 0–0.2)
BASOPHILS NFR BLD AUTO: 0.5 %
BUN SERPL-MCNC: 22 MG/DL (ref 7–30)
CALCIUM SERPL-MCNC: 8.7 MG/DL (ref 8.5–10.1)
CHLORIDE SERPL-SCNC: 106 MMOL/L (ref 94–109)
CO2 SERPL-SCNC: 27 MMOL/L (ref 20–32)
CREAT SERPL-MCNC: 0.99 MG/DL (ref 0.66–1.25)
DIFFERENTIAL METHOD BLD: NORMAL
EOSINOPHIL # BLD AUTO: 0.2 10E9/L (ref 0–0.7)
EOSINOPHIL NFR BLD AUTO: 2.6 %
ERYTHROCYTE [DISTWIDTH] IN BLOOD BY AUTOMATED COUNT: 12.8 % (ref 10–15)
GFR SERPL CREATININE-BSD FRML MDRD: 80 ML/MIN/1.7M2
GLUCOSE SERPL-MCNC: 78 MG/DL (ref 70–99)
HCT VFR BLD AUTO: 42.7 % (ref 40–53)
HGB BLD-MCNC: 15.1 G/DL (ref 13.3–17.7)
IMM GRANULOCYTES # BLD: 0 10E9/L (ref 0–0.4)
IMM GRANULOCYTES NFR BLD: 0.3 %
LYMPHOCYTES # BLD AUTO: 1.6 10E9/L (ref 0.8–5.3)
LYMPHOCYTES NFR BLD AUTO: 24 %
MCH RBC QN AUTO: 31 PG (ref 26.5–33)
MCHC RBC AUTO-ENTMCNC: 35.4 G/DL (ref 31.5–36.5)
MCV RBC AUTO: 88 FL (ref 78–100)
MONOCYTES # BLD AUTO: 0.6 10E9/L (ref 0–1.3)
MONOCYTES NFR BLD AUTO: 8.8 %
NEUTROPHILS # BLD AUTO: 4.2 10E9/L (ref 1.6–8.3)
NEUTROPHILS NFR BLD AUTO: 63.8 %
NRBC # BLD AUTO: 0 10*3/UL
NRBC BLD AUTO-RTO: 0 /100
PLATELET # BLD AUTO: 157 10E9/L (ref 150–450)
POTASSIUM SERPL-SCNC: 4 MMOL/L (ref 3.4–5.3)
RBC # BLD AUTO: 4.87 10E12/L (ref 4.4–5.9)
SODIUM SERPL-SCNC: 139 MMOL/L (ref 133–144)
WBC # BLD AUTO: 6.6 10E9/L (ref 4–11)

## 2017-12-19 PROCEDURE — 36415 COLL VENOUS BLD VENIPUNCTURE: CPT | Performed by: FAMILY MEDICINE

## 2017-12-19 PROCEDURE — 99214 OFFICE O/P EST MOD 30 MIN: CPT | Performed by: FAMILY MEDICINE

## 2017-12-19 PROCEDURE — 71020 XR CHEST 2 VW: CPT | Mod: TC

## 2017-12-19 PROCEDURE — 85025 COMPLETE CBC W/AUTO DIFF WBC: CPT | Performed by: FAMILY MEDICINE

## 2017-12-19 PROCEDURE — 80048 BASIC METABOLIC PNL TOTAL CA: CPT | Performed by: FAMILY MEDICINE

## 2017-12-19 PROCEDURE — 93000 ELECTROCARDIOGRAM COMPLETE: CPT | Performed by: INTERNAL MEDICINE

## 2017-12-19 ASSESSMENT — ANXIETY QUESTIONNAIRES
2. NOT BEING ABLE TO STOP OR CONTROL WORRYING: SEVERAL DAYS
7. FEELING AFRAID AS IF SOMETHING AWFUL MIGHT HAPPEN: SEVERAL DAYS
IF YOU CHECKED OFF ANY PROBLEMS ON THIS QUESTIONNAIRE, HOW DIFFICULT HAVE THESE PROBLEMS MADE IT FOR YOU TO DO YOUR WORK, TAKE CARE OF THINGS AT HOME, OR GET ALONG WITH OTHER PEOPLE: NOT DIFFICULT AT ALL
GAD7 TOTAL SCORE: 5
1. FEELING NERVOUS, ANXIOUS, OR ON EDGE: SEVERAL DAYS
4. TROUBLE RELAXING: NOT AT ALL
6. BECOMING EASILY ANNOYED OR IRRITABLE: SEVERAL DAYS
5. BEING SO RESTLESS THAT IT IS HARD TO SIT STILL: NOT AT ALL
3. WORRYING TOO MUCH ABOUT DIFFERENT THINGS: SEVERAL DAYS

## 2017-12-19 ASSESSMENT — PAIN SCALES - GENERAL: PAINLEVEL: NO PAIN (0)

## 2017-12-19 ASSESSMENT — PATIENT HEALTH QUESTIONNAIRE - PHQ9: SUM OF ALL RESPONSES TO PHQ QUESTIONS 1-9: 6

## 2017-12-19 NOTE — H&P (VIEW-ONLY)
[facility] [place]  [address]  [place] [address]  [phone]  Dept: [phone]    PRE-OP EVALUATION:  Today's date: 2017    Rajendra Gagnon (: 1965) presents for pre-operative evaluation assessment as requested by Dr. Ron.  He requires evaluation and anesthesia risk assessment prior to undergoing surgery/procedure for treatment for screening for colon cancer .  Proposed procedure: colonoscopy    Date of Surgery/ Procedure: 1/3/18  Time of Surgery/ Procedure: unknown  Hospital/Surgical Facility: [facility]    Primary Physician: Bhupinder Mckeon  Type of Anesthesia Anticipated: to be determined    Patient has a Health Care Directive or Living Will:  NO    1. NO - Do you have a history of heart attack, stroke, stent, bypass or surgery on an artery in the head, neck, heart or legs?  2. YES - DO YOU EVER HAVE ANY PAIN OR DISCOMFORT IN YOUR CHEST? Has had work up done  3. NO - Do you have a history of  Heart Failure?  4. NO - Are you troubled by shortness of breath when: walking on the level, up a slight hill or at night?  5. NO - Do you currently have a cold, bronchitis or other respiratory infection?  6. NO - Do you have a cough, shortness of breath or wheezing?  7. YES - DO YOU SOMETIMES GET PAINS IN THE CALVES OF YOUR LEGS WHEN YOU WALK? Poor boots  8. NO - Do you or anyone in your family have previous history of blood clots?  9. NO - Do you or does anyone in your family have a serious bleeding problem such as prolonged bleeding following surgeries or cuts?  10. NO - Have you ever had problems with anemia or been told to take iron pills?  11. NO - Have you had any abnormal blood loss such as black, tarry or bloody stools, or abnormal vaginal bleeding?  12. NO - Have you ever had a blood transfusion?  13. YES - HAVE YOU OR ANY OF YOUR RELATIVES EVER HAD PROBLEMS WITH ANESTHESIA? Hard time waking up, stopped breathing  14. NO - Do you have sleep apnea, excessive snoring or daytime  drowsiness?  15. NO - Do you have any prosthetic heart valves?  16. NO - Do you have prosthetic joints?  17. NO - Is there any chance that you may be pregnant?        HPI:                                                      Brief HPI related to upcoming procedure:   53 yO male  presents for cardiopulmonary/general clearance to undergo the above procedure.  His surgeon listed above has asked for this clearance to undergo anesthesia. .   Overall pt is of good health and has not had any perioperative complications with previous surgeries.              MEDICAL HISTORY:                                                    Patient Active Problem List    Diagnosis Date Noted     Adjustment disorder with mixed anxiety and depressed mood 11/14/2017     Priority: Medium     Varicose veins of both lower extremities 05/12/2017     Priority: Medium     ACP (advance care planning) 05/11/2016     Priority: Medium     Advance Care Planning 5/11/2016: ACP Review of Chart / Resources Provided:  Reviewed chart for advance care plan.  Rajendra Gagnon has no plan or code status on file. Discussed available resources and provided with information.   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 Provider to review       Right acoustic neuroma 03/14/2012     Priority: Medium      Past Medical History:   Diagnosis Date     Dysthymia 3/26/2012     Hearing loss in right ear 3/14/2012     History of atrial fibrillation 4/23/2012     Hypertriglyceridemia      Right acoustic neuroma 3/14/2012     Tinnitus, unspecified 3/14/2012     Umbilical hernia without obstruction and without gangrene  5/11/2016     Varicose veins      Varicose veins of both lower extremities 5/12/2017     Past Surgical History:   Procedure Laterality Date     acustic neuroma removed  3/2007    right     funnel chest[      states had chest reconstruction     HERNIA REPAIR       ORTHOPEDIC SURGERY      right knee surgery     PE TUBES       Current Outpatient Prescriptions   Medication Sig Dispense Refill     Na Sulfate-K Sulfate-Mg Sulf (SUPREP BOWEL PREP) solution Take 354 mLs (2 Bottles) by mouth See Admin Instructions 2 Bottle 0     VITAMIN D, CHOLECALCIFEROL, PO Take 4,000 Units by mouth daily       CALCIUM-MAGNESIUM-ZINC PO Take 1 tablet by mouth every other day       metroNIDAZOLE (METROGEL) 0.75 % topical gel ,Apply to redness of the face morning and bedtime 45 g 3     minocycline (MINOCIN/DYNACIN) 100 MG capsule One capsule h.s. For rosacea 28 capsule 3     cetirizine (ZYRTEC) 10 MG tablet Take 1 tablet (10 mg) by mouth daily 30 tablet 1     famotidine (PEPCID) 20 MG tablet Take 1 tablet (20 mg) by mouth 2 times daily 60 tablet 1     DiphenhydrAMINE HCl (BENADRYL PO) Take 25 mg by mouth       Acetaminophen (TYLENOL PO) Take 650 mg by mouth       Omega-3 Fatty Acids (OMEGA-3 FISH OIL PO) Take 1 g by mouth every other day       calcium & magnesium carbonates 311-232 MG TABS Take 1 tablet by mouth daily        Multiple Vitamins-Minerals (MULTIVITAMIN OR) Take 1 tablet by mouth daily with food.       ASPIRIN PO Take 325 mg by mouth once       aspirin (BABY ASPIRIN) 81 MG chewable tablet Take 162 mg by mouth daily Reported on 5/12/2017       OTC products: None, except as noted above    Allergies   Allergen Reactions     Aspartame      Venlafaxine Other (See Comments)     Night terrors     Zoloft [Sertraline] Other (See Comments)     Head rush, hives and agitation      Latex Allergy: NO    Social History   Substance Use Topics     Smoking status: Never Smoker     Smokeless tobacco: Never Used     Alcohol use 2.0 oz/week     4  "Cans of beer per week      Comment: rarely     History   Drug Use No       REVIEW OF SYSTEMS:                                                    Constitutional, neuro, ENT, endocrine, pulmonary, cardiac, gastrointestinal, genitourinary, musculoskeletal, integument and psychiatric systems are negative, except as otherwise noted.    H/o SOB at rest- needs to take deep breath. Has been  under lots of stress. No sx with heavy exertion.     EXAM:                                                    /72  Pulse 74  Temp 97.6  F (36.4  C)  Ht 6' 2\" (1.88 m)  Wt 260 lb (117.9 kg)  BMI 33.38 kg/m2    GENERAL APPEARANCE: healthy, alert and no distress     EYES: EOMI,  PERRL     HENT: ear canals and TM's normal and nose and mouth without ulcers or lesions     NECK: no adenopathy, no asymmetry, masses, or scars and thyroid normal to palpation     RESP: lungs clear to auscultation - no rales, rhonchi or wheezes     CV: regular rates and rhythm, normal S1 S2, no S3 or S4 and no murmur, click or rub     ABDOMEN:  soft, nontender, no HSM or masses and bowel sounds normal     MS: extremities normal- no gross deformities noted, no evidence of inflammation in joints, FROM in all extremities.     SKIN: no suspicious lesions or rashes     NEURO: Normal strength and tone, sensory exam grossly normal, mentation intact and speech normal     PSYCH: mentation appears normal. and affect normal/bright     LYMPHATICS: No axillary, cervical, or supraclavicular nodes    DIAGNOSTICS:                                                    EKG: appears normal, NSR, Normal Sinus Rhythm, normal axis, normal intervals, no acute ST/T changes c/w ischemia, no LVH by voltage criteria, Right Bundle Branch Block, unchanged from previous tracings  Last comparison in 2013  Recent Labs   Lab Test  12/19/17   0915  10/19/17   1008   HGB  15.1  15.9   PLT  157  166   INR   --   1.04   NA  139  138   POTASSIUM  4.0  4.5   CR  0.99  0.96      Results for " orders placed or performed in visit on 12/19/17 (from the past 24 hour(s))   XR Chest 2 Views    Narrative    XR CHEST 2 VW    HISTORY: 52 years Male ; SOB (shortness of breath); Pre-operative  examination; Special screening for malignant neoplasms, colon    COMPARISON: 10/2/2017    TECHNIQUE: 2 views of the chest were obtained.    FINDINGS: Two views of the chest were obtained. Heart size and  pulmonary vascularity are within normal limits, lungs are clear both  views. No consolidating air space opacities are present.    A pectus deformity is again seen.      Impression    IMPRESSION: Clear chest.    TANISHA HEARD MD     Sodium 133 - 144 mmol/L 139 138        Potassium 3.4 - 5.3 mmol/L 4.0 4.5       Chloride 94 - 109 mmol/L 106 104       Carbon Dioxide 20 - 32 mmol/L 27 28       Anion Gap 3 - 14 mmol/L 6 6       Glucose 70 - 99 mg/dL 78 97 83      Urea Nitrogen 7 - 30 mg/dL 22 15       Creatinine 0.66 - 1.25 mg/dL 0.99 0.96       GFR Estimate >60 mL/min/1.7m2 80 82CM      Comments: Non  GFR Calc      GFR Estimate If Black >60 mL/min/1.7m2 >90 >90CM      Comments:  GFR Calc      Calcium 8.5 - 10.1 mg/dL 8.7 9.1      Resulting Agency  [facility] POINT OF CARE TEST, GLUCOSE          Specimen Collected: 12/19/17  9:15 AM Last Resulted: 12/19/17  9:37 AM                CM=Additional comments                      CBC with platelets differential   Status:  Final result   Visible to patient:  No (Not Released) Dx:  Special screening for malignant neopl... Order: [id number]             Ref Range & Units 9:15 AM  (12/19/17) 2mo ago  (10/19/17) 2mo ago  (10/2/17)      WBC 4.0 - 11.0 10e9/L 6.6 6.4 9.4      RBC Count 4.4 - 5.9 10e12/L 4.87 5.17 4.85      Hemoglobin 13.3 - 17.7 g/dL 15.1 15.9 15.3      Hematocrit 40.0 - 53.0 % 42.7 44.8 41.8      MCV 78 - 100 fl 88 87 86      MCH 26.5 - 33.0 pg 31.0 30.8 31.5      MCHC 31.5 - 36.5 g/dL 35.4 35.5 36.6 (H)      RDW 10.0 - 15.0 % 12.8 12.9 12.9       Platelet Count 150 - 450 10e9/L 157 166 173      Diff Method  Automated Method Automated Method Automated Method      % Neutrophils % 63.8 74.8 74.7      % Lymphocytes % 24.0 16.6 16.4      % Monocytes % 8.8 6.4 6.3      % Eosinophils % 2.6 1.2 1.8      % Basophils % 0.5 0.5 0.4      % Immature Granulocytes % 0.3 0.5 0.4      Nucleated RBCs 0 /100 0 0 0      Absolute Neutrophil 1.6 - 8.3 10e9/L 4.2 4.8 7.0      Absolute Lymphocytes 0.8 - 5.3 10e9/L 1.6 1.1 1.5      Absolute Monocytes 0.0 - 1.3 10e9/L 0.6 0.4 0.6      Absolute Eosinophils 0.0 - 0.7 10e9/L 0.2 0.1 0.2      Absolute Basophils 0.0 - 0.2 10e9/L 0.0 0.0 0.0      Abs Immature Granulocytes 0 - 0.4 10e9/L 0.0 0.0 0.0      Absolute Nucleated RBC  0.0 0.0 0.0     Resulting Agency  [facility]          Specimen Collected: 12/19/17  9:15 AM Last Resulted: 12/19/17  9:24 AM                                 Status of Other Orders        Order    Lab Status Result Date Provider Status       EKG 12-lead complete w/read - Clinics No Result  Ordered               IMPRESSION:                                                    Reason for surgery/procedure: cardiopulmonary clearance   Diagnosis/reason for consult: screening colonoscopy     The proposed surgical procedure is considered LOW risk.    REVISED CARDIAC RISK INDEX  The patient has the following serious cardiovascular risks for perioperative complications such as (MI, PE, VFib and 3  AV Block):  No serious cardiac risks  INTERPRETATION: 0 risks: Class I (very low risk - 0.4% complication rate)    The patient has the following additional risks for perioperative complications:  No identified additional risks      ICD-10-CM    1. Pre-operative general physical examination Z01.818    2. Encounter for screening colonoscopy Z12.11    3. Special screening for malignant neoplasms, colon Z12.11 CBC with platelets differential     Basic metabolic panel     EKG 12-lead complete w/read - Clinics   4. SOB (shortness of  breath) R06.02 CBC with platelets differential     Basic metabolic panel     EKG 12-lead complete w/read - Clinics   5. Preop general physical exam Z01.818      SOB at rest and sound to be more a big sigh and anxiety induced.   RECOMMENDATIONS:                                                      Discussed anxiety and SOB sx. No further w/u needed   APPROVAL GIVEN to proceed with proposed procedure, without further diagnostic evaluation       Signed Electronically by: Bhupinder Mckeon MD    Copy of this evaluation report is provided to requesting physician.    [facility] Preop Guidelines

## 2017-12-19 NOTE — MR AVS SNAPSHOT
After Visit Summary   12/19/2017    Rajendra Gagnon    MRN: [id number]           Patient Information     Date Of Birth          1965        Visit Information        Provider Department      12/19/2017 9:00 AM Bhupinder Mckeon MD [facility]        Today's Diagnoses     Pre-operative general physical examination    -  1    Encounter for screening colonoscopy        Special screening for malignant neoplasms, colon        SOB (shortness of breath)        Preop general physical exam          Care Instructions      Before Your Surgery      Call your surgeon if there is any change in your health. This includes signs of a cold or flu (such as a sore throat, runny nose, cough, rash or fever).    Do not smoke, drink alcohol or take over the counter medicine (unless your surgeon or primary care doctor tells you to) for the 24 hours before and after surgery.    If you take prescribed drugs: Follow your doctor s orders about which medicines to take and which to stop until after surgery.    Eating and drinking prior to surgery: follow the instructions from your surgeon    Take a shower or bath the night before surgery. Use the soap your surgeon gave you to gently clean your skin. If you do not have soap from your surgeon, use your regular soap. Do not shave or scrub the surgery site.  Wear clean pajamas and have clean sheets on your bed.           Follow-ups after your visit        Your next 10 appointments already scheduled     Jan 03, 2018   Procedure with Reggie Ron DO   [facility] ([address] [address] [phone]              Who to contact     If you have questions or need follow up information about today's clinic visit or your schedule please contact [facility] directly at [phone].  Normal or non-critical lab and imaging results will be communicated to you by MyChart, letter or phone within 4 business  "days after the clinic has received the results. If you do not hear from us within 7 days, please contact the clinic through [employer] or phone. If you have a critical or abnormal lab result, we will notify you by phone as soon as possible.  Submit refill requests through [employer] or call your pharmacy and they will forward the refill request to us. Please allow 3 business days for your refill to be completed.          Additional Information About Your Visit        [employer] Information     [employer] gives you secure access to your electronic health record. If you see a primary care provider, you can also send messages to your care team and make appointments. If you have questions, please call your primary care clinic.  If you do not have a primary care provider, please call [phone] and they will assist you.        Care EveryWhere ID     This is your Care EveryWhere ID. This could be used by other organizations to access your [place] medical records  [id number]        Your Vitals Were     Pulse Temperature Height BMI (Body Mass Index)          74 97.6  F (36.4  C) 6' 2\" (1.88 m) 33.38 kg/m2         Blood Pressure from Last 3 Encounters:   12/19/17 120/72   12/04/17 116/74   11/28/17 112/80    Weight from Last 3 Encounters:   12/19/17 260 lb (117.9 kg)   12/04/17 260 lb (117.9 kg)   11/28/17 260 lb (117.9 kg)              We Performed the Following     Basic metabolic panel     CBC with platelets differential     EKG 12-lead complete w/read - Clinics        Primary Care Provider Office Phone # Fax #    Bhupinder Mckeon [phone]       [facility] [address]  [place] [address]        Equal Access to Services     [facility] Center[facility]IESER : Hadii augusto Villegas, waaxda luqadaha, qaybta kaalniki guerrero. So [facility] [phone].    ATENCIÓN: Si habla español, tiene a olvera disposición servicios gratuitos de asistencia lingüística. Llame al " [phone].    We comply with applicable federal civil rights laws and [place] laws. We do not discriminate on the basis of race, color, national origin, age, disability, sex, sexual orientation, or gender identity.            Thank you!     Thank you for choosing [facility]  for your care. Our goal is always to provide you with excellent care. Hearing back from our patients is one way we can continue to improve our services. Please take a few minutes to complete the written survey that you may receive in the mail after your visit with us. Thank you!             Your Updated Medication List - Protect others around you: Learn how to safely use, store and throw away your medicines at www.disposemymeds.org.          This list is accurate as of: 12/19/17 10:14 AM.  Always use your most recent med list.                   Brand Name Dispense Instructions for use Diagnosis    ASPIRIN PO      Take 325 mg by mouth once        BABY ASPIRIN 81 MG chewable tablet   Generic drug:  aspirin      Take 162 mg by mouth daily Reported on 5/12/2017        BENADRYL PO      Take 25 mg by mouth        calcium & magnesium carbonates 311-232 MG Tabs      Take 1 tablet by mouth daily        CALCIUM-MAGNESIUM-ZINC PO      Take 1 tablet by mouth every other day        cetirizine 10 MG tablet    zyrTEC    30 tablet    Take 1 tablet (10 mg) by mouth daily    Hives       famotidine 20 MG tablet    PEPCID    60 tablet    Take 1 tablet (20 mg) by mouth 2 times daily    Hives       metroNIDAZOLE 0.75 % topical gel    METROGEL    45 g    ,Apply to redness of the face morning and bedtime    Acne rosacea, Lichen simplex chronicus       minocycline 100 MG capsule    MINOCIN/DYNACIN    28 capsule    One capsule h.s. For rosacea    Acne rosacea       MULTIVITAMIN PO      Take 1 tablet by mouth daily with food.        Na Sulfate-K Sulfate-Mg Sulf solution    SUPREP BOWEL PREP    2 Bottle    Take 354 mLs (2 Bottles) by mouth See Admin  Instructions    Special screening for malignant neoplasms, colon       OMEGA-3 FISH OIL PO      Take 1 g by mouth every other day        TYLENOL PO      Take 650 mg by mouth        VITAMIN D (CHOLECALCIFEROL) PO      Take 4,000 Units by mouth daily

## 2017-12-19 NOTE — PROGRESS NOTES
[facility] [place]  [address]  [place] [address]  [phone]  Dept: [phone]    PRE-OP EVALUATION:  Today's date: 2017    Rajendra Gagnon (: 1965) presents for pre-operative evaluation assessment as requested by Dr. Ron.  He requires evaluation and anesthesia risk assessment prior to undergoing surgery/procedure for treatment for screening for colon cancer .  Proposed procedure: colonoscopy    Date of Surgery/ Procedure: 1/3/18  Time of Surgery/ Procedure: unknown  Hospital/Surgical Facility: [facility]    Primary Physician: Bhupinder Mckeon  Type of Anesthesia Anticipated: to be determined    Patient has a Health Care Directive or Living Will:  NO    1. NO - Do you have a history of heart attack, stroke, stent, bypass or surgery on an artery in the head, neck, heart or legs?  2. YES - DO YOU EVER HAVE ANY PAIN OR DISCOMFORT IN YOUR CHEST? Has had work up done  3. NO - Do you have a history of  Heart Failure?  4. NO - Are you troubled by shortness of breath when: walking on the level, up a slight hill or at night?  5. NO - Do you currently have a cold, bronchitis or other respiratory infection?  6. NO - Do you have a cough, shortness of breath or wheezing?  7. YES - DO YOU SOMETIMES GET PAINS IN THE CALVES OF YOUR LEGS WHEN YOU WALK? Poor boots  8. NO - Do you or anyone in your family have previous history of blood clots?  9. NO - Do you or does anyone in your family have a serious bleeding problem such as prolonged bleeding following surgeries or cuts?  10. NO - Have you ever had problems with anemia or been told to take iron pills?  11. NO - Have you had any abnormal blood loss such as black, tarry or bloody stools, or abnormal vaginal bleeding?  12. NO - Have you ever had a blood transfusion?  13. YES - HAVE YOU OR ANY OF YOUR RELATIVES EVER HAD PROBLEMS WITH ANESTHESIA? Hard time waking up, stopped breathing  14. NO - Do you have sleep apnea, excessive snoring or daytime  drowsiness?  15. NO - Do you have any prosthetic heart valves?  16. NO - Do you have prosthetic joints?  17. NO - Is there any chance that you may be pregnant?        HPI:                                                      Brief HPI related to upcoming procedure:   51 yO male  presents for cardiopulmonary/general clearance to undergo the above procedure.  His surgeon listed above has asked for this clearance to undergo anesthesia. .   Overall pt is of good health and has not had any perioperative complications with previous surgeries.              MEDICAL HISTORY:                                                    Patient Active Problem List    Diagnosis Date Noted     Adjustment disorder with mixed anxiety and depressed mood 11/14/2017     Priority: Medium     Varicose veins of both lower extremities 05/12/2017     Priority: Medium     ACP (advance care planning) 05/11/2016     Priority: Medium     Advance Care Planning 5/11/2016: ACP Review of Chart / Resources Provided:  Reviewed chart for advance care plan.  Rajendra Gagnon has no plan or code status on file. Discussed available resources and provided with information.   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 Provider to review       Right acoustic neuroma 03/14/2012     Priority: Medium      Past Medical History:   Diagnosis Date     Dysthymia 3/26/2012     Hearing loss in right ear 3/14/2012     History of atrial fibrillation 4/23/2012     Hypertriglyceridemia      Right acoustic neuroma 3/14/2012     Tinnitus, unspecified 3/14/2012     Umbilical hernia without obstruction and without gangrene  5/11/2016     Varicose veins      Varicose veins of both lower extremities 5/12/2017     Past Surgical History:   Procedure Laterality Date     acustic neuroma removed  3/2007    right     funnel chest[      states had chest reconstruction     HERNIA REPAIR       ORTHOPEDIC SURGERY      right knee surgery     PE TUBES       Current Outpatient Prescriptions   Medication Sig Dispense Refill     Na Sulfate-K Sulfate-Mg Sulf (SUPREP BOWEL PREP) solution Take 354 mLs (2 Bottles) by mouth See Admin Instructions 2 Bottle 0     VITAMIN D, CHOLECALCIFEROL, PO Take 4,000 Units by mouth daily       CALCIUM-MAGNESIUM-ZINC PO Take 1 tablet by mouth every other day       metroNIDAZOLE (METROGEL) 0.75 % topical gel ,Apply to redness of the face morning and bedtime 45 g 3     minocycline (MINOCIN/DYNACIN) 100 MG capsule One capsule h.s. For rosacea 28 capsule 3     cetirizine (ZYRTEC) 10 MG tablet Take 1 tablet (10 mg) by mouth daily 30 tablet 1     famotidine (PEPCID) 20 MG tablet Take 1 tablet (20 mg) by mouth 2 times daily 60 tablet 1     DiphenhydrAMINE HCl (BENADRYL PO) Take 25 mg by mouth       Acetaminophen (TYLENOL PO) Take 650 mg by mouth       Omega-3 Fatty Acids (OMEGA-3 FISH OIL PO) Take 1 g by mouth every other day       calcium & magnesium carbonates 311-232 MG TABS Take 1 tablet by mouth daily        Multiple Vitamins-Minerals (MULTIVITAMIN OR) Take 1 tablet by mouth daily with food.       ASPIRIN PO Take 325 mg by mouth once       aspirin (BABY ASPIRIN) 81 MG chewable tablet Take 162 mg by mouth daily Reported on 5/12/2017       OTC products: None, except as noted above    Allergies   Allergen Reactions     Aspartame      Venlafaxine Other (See Comments)     Night terrors     Zoloft [Sertraline] Other (See Comments)     Head rush, hives and agitation      Latex Allergy: NO    Social History   Substance Use Topics     Smoking status: Never Smoker     Smokeless tobacco: Never Used     Alcohol use 2.0 oz/week     4  "Cans of beer per week      Comment: rarely     History   Drug Use No       REVIEW OF SYSTEMS:                                                    Constitutional, neuro, ENT, endocrine, pulmonary, cardiac, gastrointestinal, genitourinary, musculoskeletal, integument and psychiatric systems are negative, except as otherwise noted.    H/o SOB at rest- needs to take deep breath. Has been  under lots of stress. No sx with heavy exertion.     EXAM:                                                    /72  Pulse 74  Temp 97.6  F (36.4  C)  Ht 6' 2\" (1.88 m)  Wt 260 lb (117.9 kg)  BMI 33.38 kg/m2    GENERAL APPEARANCE: healthy, alert and no distress     EYES: EOMI,  PERRL     HENT: ear canals and TM's normal and nose and mouth without ulcers or lesions     NECK: no adenopathy, no asymmetry, masses, or scars and thyroid normal to palpation     RESP: lungs clear to auscultation - no rales, rhonchi or wheezes     CV: regular rates and rhythm, normal S1 S2, no S3 or S4 and no murmur, click or rub     ABDOMEN:  soft, nontender, no HSM or masses and bowel sounds normal     MS: extremities normal- no gross deformities noted, no evidence of inflammation in joints, FROM in all extremities.     SKIN: no suspicious lesions or rashes     NEURO: Normal strength and tone, sensory exam grossly normal, mentation intact and speech normal     PSYCH: mentation appears normal. and affect normal/bright     LYMPHATICS: No axillary, cervical, or supraclavicular nodes    DIAGNOSTICS:                                                    EKG: appears normal, NSR, Normal Sinus Rhythm, normal axis, normal intervals, no acute ST/T changes c/w ischemia, no LVH by voltage criteria, Right Bundle Branch Block, unchanged from previous tracings  Last comparison in 2013  Recent Labs   Lab Test  12/19/17   0915  10/19/17   1008   HGB  15.1  15.9   PLT  157  166   INR   --   1.04   NA  139  138   POTASSIUM  4.0  4.5   CR  0.99  0.96      Results for " orders placed or performed in visit on 12/19/17 (from the past 24 hour(s))   XR Chest 2 Views    Narrative    XR CHEST 2 VW    HISTORY: 52 years Male ; SOB (shortness of breath); Pre-operative  examination; Special screening for malignant neoplasms, colon    COMPARISON: 10/2/2017    TECHNIQUE: 2 views of the chest were obtained.    FINDINGS: Two views of the chest were obtained. Heart size and  pulmonary vascularity are within normal limits, lungs are clear both  views. No consolidating air space opacities are present.    A pectus deformity is again seen.      Impression    IMPRESSION: Clear chest.    TANISHA HEARD MD     Sodium 133 - 144 mmol/L 139 138        Potassium 3.4 - 5.3 mmol/L 4.0 4.5       Chloride 94 - 109 mmol/L 106 104       Carbon Dioxide 20 - 32 mmol/L 27 28       Anion Gap 3 - 14 mmol/L 6 6       Glucose 70 - 99 mg/dL 78 97 83      Urea Nitrogen 7 - 30 mg/dL 22 15       Creatinine 0.66 - 1.25 mg/dL 0.99 0.96       GFR Estimate >60 mL/min/1.7m2 80 82CM      Comments: Non  GFR Calc      GFR Estimate If Black >60 mL/min/1.7m2 >90 >90CM      Comments:  GFR Calc      Calcium 8.5 - 10.1 mg/dL 8.7 9.1      Resulting Agency  [facility] POINT OF CARE TEST, GLUCOSE          Specimen Collected: 12/19/17  9:15 AM Last Resulted: 12/19/17  9:37 AM                CM=Additional comments                      CBC with platelets differential   Status:  Final result   Visible to patient:  No (Not Released) Dx:  Special screening for malignant neopl... Order: [id number]             Ref Range & Units 9:15 AM  (12/19/17) 2mo ago  (10/19/17) 2mo ago  (10/2/17)      WBC 4.0 - 11.0 10e9/L 6.6 6.4 9.4      RBC Count 4.4 - 5.9 10e12/L 4.87 5.17 4.85      Hemoglobin 13.3 - 17.7 g/dL 15.1 15.9 15.3      Hematocrit 40.0 - 53.0 % 42.7 44.8 41.8      MCV 78 - 100 fl 88 87 86      MCH 26.5 - 33.0 pg 31.0 30.8 31.5      MCHC 31.5 - 36.5 g/dL 35.4 35.5 36.6 (H)      RDW 10.0 - 15.0 % 12.8 12.9 12.9       Platelet Count 150 - 450 10e9/L 157 166 173      Diff Method  Automated Method Automated Method Automated Method      % Neutrophils % 63.8 74.8 74.7      % Lymphocytes % 24.0 16.6 16.4      % Monocytes % 8.8 6.4 6.3      % Eosinophils % 2.6 1.2 1.8      % Basophils % 0.5 0.5 0.4      % Immature Granulocytes % 0.3 0.5 0.4      Nucleated RBCs 0 /100 0 0 0      Absolute Neutrophil 1.6 - 8.3 10e9/L 4.2 4.8 7.0      Absolute Lymphocytes 0.8 - 5.3 10e9/L 1.6 1.1 1.5      Absolute Monocytes 0.0 - 1.3 10e9/L 0.6 0.4 0.6      Absolute Eosinophils 0.0 - 0.7 10e9/L 0.2 0.1 0.2      Absolute Basophils 0.0 - 0.2 10e9/L 0.0 0.0 0.0      Abs Immature Granulocytes 0 - 0.4 10e9/L 0.0 0.0 0.0      Absolute Nucleated RBC  0.0 0.0 0.0     Resulting Agency  [facility]          Specimen Collected: 12/19/17  9:15 AM Last Resulted: 12/19/17  9:24 AM                                 Status of Other Orders        Order    Lab Status Result Date Provider Status       EKG 12-lead complete w/read - Clinics No Result  Ordered               IMPRESSION:                                                    Reason for surgery/procedure: cardiopulmonary clearance   Diagnosis/reason for consult: screening colonoscopy     The proposed surgical procedure is considered LOW risk.    REVISED CARDIAC RISK INDEX  The patient has the following serious cardiovascular risks for perioperative complications such as (MI, PE, VFib and 3  AV Block):  No serious cardiac risks  INTERPRETATION: 0 risks: Class I (very low risk - 0.4% complication rate)    The patient has the following additional risks for perioperative complications:  No identified additional risks      ICD-10-CM    1. Pre-operative general physical examination Z01.818    2. Encounter for screening colonoscopy Z12.11    3. Special screening for malignant neoplasms, colon Z12.11 CBC with platelets differential     Basic metabolic panel     EKG 12-lead complete w/read - Clinics   4. SOB (shortness of  breath) R06.02 CBC with platelets differential     Basic metabolic panel     EKG 12-lead complete w/read - Clinics   5. Preop general physical exam Z01.818      SOB at rest and sound to be more a big sigh and anxiety induced.   RECOMMENDATIONS:                                                      Discussed anxiety and SOB sx. No further w/u needed   APPROVAL GIVEN to proceed with proposed procedure, without further diagnostic evaluation       Signed Electronically by: Bhupinder Mckeon MD    Copy of this evaluation report is provided to requesting physician.    [facility] Preop Guidelines

## 2017-12-19 NOTE — NURSING NOTE
"Chief Complaint   Patient presents with     Pre-Op Exam       Initial /72  Pulse 74  Temp 97.6  F (36.4  C)  Ht 6' 2\" (1.88 m)  Wt 260 lb (117.9 kg)  BMI 33.38 kg/m2 Estimated body mass index is 33.38 kg/(m^2) as calculated from the following:    Height as of this encounter: 6' 2\" (1.88 m).    Weight as of this encounter: 260 lb (117.9 kg).  Medication Reconciliation: complete     Juan A Vitale      "

## 2017-12-20 ASSESSMENT — ANXIETY QUESTIONNAIRES: GAD7 TOTAL SCORE: 5

## 2018-01-02 ENCOUNTER — ANESTHESIA EVENT (OUTPATIENT)
Dept: SURGERY | Facility: HOSPITAL | Age: 53
End: 2018-01-02
Payer: COMMERCIAL

## 2018-01-02 RX ORDER — ALBUTEROL SULFATE 0.83 MG/ML
2.5 SOLUTION RESPIRATORY (INHALATION) EVERY 4 HOURS PRN
Status: CANCELLED | OUTPATIENT
Start: 2018-01-02

## 2018-01-02 RX ORDER — NALOXONE HYDROCHLORIDE 0.4 MG/ML
.1-.4 INJECTION, SOLUTION INTRAMUSCULAR; INTRAVENOUS; SUBCUTANEOUS
Status: CANCELLED | OUTPATIENT
Start: 2018-01-02 | End: 2018-01-03

## 2018-01-02 RX ORDER — KETOROLAC TROMETHAMINE 30 MG/ML
30 INJECTION, SOLUTION INTRAMUSCULAR; INTRAVENOUS EVERY 6 HOURS PRN
Status: CANCELLED | OUTPATIENT
Start: 2018-01-02 | End: 2018-01-07

## 2018-01-02 RX ORDER — MEPERIDINE HYDROCHLORIDE 25 MG/ML
12.5 INJECTION INTRAMUSCULAR; INTRAVENOUS; SUBCUTANEOUS
Status: CANCELLED | OUTPATIENT
Start: 2018-01-02

## 2018-01-02 RX ORDER — ONDANSETRON 2 MG/ML
4 INJECTION INTRAMUSCULAR; INTRAVENOUS EVERY 30 MIN PRN
Status: CANCELLED | OUTPATIENT
Start: 2018-01-02

## 2018-01-02 RX ORDER — ONDANSETRON 4 MG/1
4 TABLET, ORALLY DISINTEGRATING ORAL EVERY 30 MIN PRN
Status: CANCELLED | OUTPATIENT
Start: 2018-01-02

## 2018-01-02 RX ORDER — FENTANYL CITRATE 50 UG/ML
25-50 INJECTION, SOLUTION INTRAMUSCULAR; INTRAVENOUS
Status: CANCELLED | OUTPATIENT
Start: 2018-01-02

## 2018-01-02 RX ORDER — DEXAMETHASONE SODIUM PHOSPHATE 4 MG/ML
4 INJECTION, SOLUTION INTRA-ARTICULAR; INTRALESIONAL; INTRAMUSCULAR; INTRAVENOUS; SOFT TISSUE EVERY 10 MIN PRN
Status: CANCELLED | OUTPATIENT
Start: 2018-01-02

## 2018-01-02 RX ORDER — HYDRALAZINE HYDROCHLORIDE 20 MG/ML
2.5-5 INJECTION INTRAMUSCULAR; INTRAVENOUS EVERY 10 MIN PRN
Status: CANCELLED | OUTPATIENT
Start: 2018-01-02

## 2018-01-02 RX ORDER — PROMETHAZINE HYDROCHLORIDE 25 MG/ML
12.5 INJECTION, SOLUTION INTRAMUSCULAR; INTRAVENOUS
Status: CANCELLED | OUTPATIENT
Start: 2018-01-02

## 2018-01-02 RX ORDER — SODIUM CHLORIDE, SODIUM LACTATE, POTASSIUM CHLORIDE, CALCIUM CHLORIDE 600; 310; 30; 20 MG/100ML; MG/100ML; MG/100ML; MG/100ML
INJECTION, SOLUTION INTRAVENOUS CONTINUOUS
Status: CANCELLED | OUTPATIENT
Start: 2018-01-02

## 2018-01-02 ASSESSMENT — ENCOUNTER SYMPTOMS: DYSRHYTHMIAS: 1

## 2018-01-02 NOTE — ANESTHESIA PREPROCEDURE EVALUATION
Anesthesia Evaluation     . Pt has had prior anesthetic.     History of anesthetic complications    slow to wake      ROS/MED HX    ENT/Pulmonary:  - neg pulmonary ROS   (+)ROSMERY risk factors tobacco use, Past use , . .    Neurologic:     (+)other neuro (Pt reports dizziness on and off now) h/o Right acoustic neuroma s/p resection 2006    Cardiovascular:     (+) Dyslipidemia, ----. : . . VALENCIA, . :. dysrhythmias a-fib, Irregular Heartbeat/Palpitations, . Previous cardiac testing date:results:date: results:ECG reviewed date:12/19/2017 results:appears normal, NSR, Normal Sinus Rhythm, normal axis, normal intervals, no acute ST/T changes c/w ischemia, no LVH by voltage criteria, Right Bundle Branch Block, unchanged from previous tracings date: results:         (-) taking anticoagulants/antiplatelets   METS/Exercise Tolerance:     Hematologic:  - neg hematologic  ROS       Musculoskeletal:   (+) arthritis, , , other musculoskeletal- h/o Pectus Excavatum s/p Reconstruction       GI/Hepatic:     (+) GERD Asymptomatic on medication, bowel prep, Other GI/Hepatic Umbilical hernia      Renal/Genitourinary:  - ROS Renal section negative       Endo:     (+) Obesity, .      Psychiatric:     (+) psychiatric history anxiety and depression      Infectious Disease:  - neg infectious disease ROS       Malignancy:   (+) Malignancy History of Neuro  Neuro CA status post Surgery.          Other:    - neg other ROS                 Physical Exam  Normal systems: cardiovascular, pulmonary and dental    Airway   Mallampati: II  TM distance: >3 FB  Neck ROM: full    Dental     Cardiovascular   Rhythm and rate: regular and normal      Pulmonary     Other findings: Pt reports he has to remind himself to breathe, denies SOB or Chest pain                Anesthesia Plan      History & Physical Review  History and physical reviewed and following examination; no interval change.    ASA Status:  3 .        Plan for MAC with Intravenous and Propofol  induction. Maintenance will be TIVA.  Reason for MAC:  Chronic cardiopulmonary disease (G9) and Other - see comments  PONV prophylaxis:  Ondansetron (or other 5HT-3)  Surgeon requests deep sedation. Patient is an ASA 3. Will provide MAC.    Risks and benefits of MAC anesthetic discussed including dental damage, aspiration, loss of airway, conversion to general anesthetic, CV complications, MI, stroke, death. Pt wishes to proceed.       Postoperative Care  Postoperative pain management:  IV analgesics.      Consents  Anesthetic plan, risks, benefits and alternatives discussed with:  Patient..                          .

## 2018-01-03 ENCOUNTER — ANESTHESIA (OUTPATIENT)
Dept: SURGERY | Facility: HOSPITAL | Age: 53
End: 2018-01-03
Payer: COMMERCIAL

## 2018-01-03 ENCOUNTER — SURGERY (OUTPATIENT)
Age: 53
End: 2018-01-03

## 2018-01-03 ENCOUNTER — HOSPITAL ENCOUNTER (OUTPATIENT)
Facility: HOSPITAL | Age: 53
Discharge: HOME | End: 2018-01-03
Attending: SURGERY | Admitting: SURGERY
Payer: COMMERCIAL

## 2018-01-03 VITALS
SYSTOLIC BLOOD PRESSURE: 118 MMHG | RESPIRATION RATE: 16 BRPM | WEIGHT: 280.8 LBS | BODY MASS INDEX: 36.04 KG/M2 | TEMPERATURE: 97 F | DIASTOLIC BLOOD PRESSURE: 75 MMHG | HEIGHT: 74 IN | OXYGEN SATURATION: 97 %

## 2018-01-03 PROCEDURE — 36000050 ZZH SURGERY LEVEL 2 1ST 30 MIN: Performed by: SURGERY

## 2018-01-03 PROCEDURE — 71000027 ZZH RECOVERY PHASE 2 EACH 15 MINS: Performed by: SURGERY

## 2018-01-03 PROCEDURE — 40000305 ZZH STATISTIC PRE PROC ASSESS I: Performed by: SURGERY

## 2018-01-03 PROCEDURE — 01999 UNLISTED ANES PROCEDURE: CPT | Performed by: NURSE ANESTHETIST, CERTIFIED REGISTERED

## 2018-01-03 PROCEDURE — 25000128 H RX IP 250 OP 636: Performed by: ANESTHESIOLOGY

## 2018-01-03 PROCEDURE — 37000008 ZZH ANESTHESIA TECHNICAL FEE, 1ST 30 MIN: Performed by: SURGERY

## 2018-01-03 PROCEDURE — G0121 COLON CA SCRN NOT HI RSK IND: HCPCS | Performed by: SURGERY

## 2018-01-03 PROCEDURE — 25000128 H RX IP 250 OP 636: Performed by: NURSE ANESTHETIST, CERTIFIED REGISTERED

## 2018-01-03 PROCEDURE — 25000125 ZZHC RX 250: Performed by: NURSE ANESTHETIST, CERTIFIED REGISTERED

## 2018-01-03 RX ORDER — PROPOFOL 10 MG/ML
INJECTION, EMULSION INTRAVENOUS PRN
Status: DISCONTINUED | OUTPATIENT
Start: 2018-01-03 | End: 2018-01-03

## 2018-01-03 RX ORDER — SODIUM CHLORIDE, SODIUM LACTATE, POTASSIUM CHLORIDE, CALCIUM CHLORIDE 600; 310; 30; 20 MG/100ML; MG/100ML; MG/100ML; MG/100ML
INJECTION, SOLUTION INTRAVENOUS CONTINUOUS
Status: DISCONTINUED | OUTPATIENT
Start: 2018-01-03 | End: 2018-01-03 | Stop reason: HOSPADM

## 2018-01-03 RX ORDER — LIDOCAINE 40 MG/G
CREAM TOPICAL
Status: DISCONTINUED | OUTPATIENT
Start: 2018-01-03 | End: 2018-01-03 | Stop reason: HOSPADM

## 2018-01-03 RX ORDER — LIDOCAINE HYDROCHLORIDE 20 MG/ML
INJECTION, SOLUTION INFILTRATION; PERINEURAL PRN
Status: DISCONTINUED | OUTPATIENT
Start: 2018-01-03 | End: 2018-01-03

## 2018-01-03 RX ADMIN — PROPOFOL 30 MG: 10 INJECTION, EMULSION INTRAVENOUS at 07:29

## 2018-01-03 RX ADMIN — PROPOFOL 20 MG: 10 INJECTION, EMULSION INTRAVENOUS at 07:47

## 2018-01-03 RX ADMIN — PROPOFOL 30 MG: 10 INJECTION, EMULSION INTRAVENOUS at 07:36

## 2018-01-03 RX ADMIN — PROPOFOL 20 MG: 10 INJECTION, EMULSION INTRAVENOUS at 07:45

## 2018-01-03 RX ADMIN — PROPOFOL 30 MG: 10 INJECTION, EMULSION INTRAVENOUS at 07:34

## 2018-01-03 RX ADMIN — SODIUM CHLORIDE, POTASSIUM CHLORIDE, SODIUM LACTATE AND CALCIUM CHLORIDE: 600; 310; 30; 20 INJECTION, SOLUTION INTRAVENOUS at 07:19

## 2018-01-03 RX ADMIN — PROPOFOL 30 MG: 10 INJECTION, EMULSION INTRAVENOUS at 07:33

## 2018-01-03 RX ADMIN — LIDOCAINE HYDROCHLORIDE 40 MG: 20 INJECTION, SOLUTION INFILTRATION; PERINEURAL at 07:28

## 2018-01-03 RX ADMIN — PROPOFOL 30 MG: 10 INJECTION, EMULSION INTRAVENOUS at 07:30

## 2018-01-03 RX ADMIN — PROPOFOL 20 MG: 10 INJECTION, EMULSION INTRAVENOUS at 07:41

## 2018-01-03 RX ADMIN — PROPOFOL 20 MG: 10 INJECTION, EMULSION INTRAVENOUS at 07:37

## 2018-01-03 RX ADMIN — PROPOFOL 20 MG: 10 INJECTION, EMULSION INTRAVENOUS at 07:39

## 2018-01-03 RX ADMIN — PROPOFOL 30 MG: 10 INJECTION, EMULSION INTRAVENOUS at 07:32

## 2018-01-03 RX ADMIN — PROPOFOL 20 MG: 10 INJECTION, EMULSION INTRAVENOUS at 07:43

## 2018-01-03 ASSESSMENT — LIFESTYLE VARIABLES: TOBACCO_USE: 1

## 2018-01-03 NOTE — IP AVS SNAPSHOT
MRN:[id number]                      After Visit Summary   1/3/2018    Rajendra Gagnon    MRN: [id number]           Thank you!     Thank you for choosing [facility] for your care. Our goal is always to provide you with excellent care. Hearing back from our patients is one way we can continue to improve our services. Please take a few minutes to complete the written survey that you may receive in the mail after you visit with us. Thank you!        Patient Information     Date Of Birth          1965        About your hospital stay     You were admitted on:  January 3, 2018 You last received care in the:  [facility] Preop/[facility] II    You were discharged on:  January 3, 2018       Who to Call     For medical emergencies, please call 911.  For non-urgent questions about your medical care, please call your primary care provider or clinic, [phone]  For questions related to your surgery, please call your surgery clinic        Attending Provider     Provider Specialty    Reggie Ron, DO Surgery       Primary Care Provider Office Phone # Fax #    Bhupinder Mckeon [phone]      Further instructions from your care team           INSTRUCTIONS AFTER COLONOSCOPY    WHEN YOU ARE BACK HOME:    Plan to rest for an hour or two after you get home.    You may have some cramping or pressure until you pass gas.    You may resume your regular medications.    Eat a small, light meal at first, and then gradually return to normal meal sizes.  If you had a polyp removed:    Slight bleeding may occur.  You may have a slight blood stain on the toilet paper after a bowel movement.    To lessen the chance of bleeding, avoid heavy exercise for ONE WEEK.  This includes heavy lifting, vigorous sport activities, and heavy physical labor.  You may resume your normal sexual activity.      Avoid aspirin or aspirin products if instructed by your doctor.    WHAT TO WATCH FOR:  Problems rarely occur after the exam;  "however, it is important for you to watch for early signs of possible problems.  If you have     Unusual pain in your abdomen    Nausea and vomiting that persists    Excessive bleeding    Black or bloody bowel movements    Fever or temperature above 100.6 F  Please call your doctor ([facility] [phone]) or go to the nearest hospital emergency room.    Post-Anesthesia Patient Instructions    IMMEDIATELY FOLLOWING SURGERY:  Do not drive or operate machinery for the first twenty four hours after surgery.  Do not make any important decisions for twenty four hours after surgery or while taking narcotic pain medications or sedatives.  If you develop intractable nausea and vomiting or a severe headache please notify your doctor immediately.    FOLLOW-UP:  Please make an appointment with your surgeon as instructed. You do not need to follow up with anesthesia unless specifically instructed to do so.    WOUND CARE INSTRUCTIONS (if applicable):  Keep a dry clean dressing on the anesthesia/puncture wound site if there is drainage.  Once the wound has quit draining you may leave it open to air.  Generally you should leave the bandage intact for twenty four hours unless there is drainage.  If the epidural site drains for more than 36-48 hours please call the anesthesia department.    QUESTIONS?:  Please feel free to call your physician or the hospital  if you have any questions, and they will be happy to assist you.       Pending Results     No orders found from 1/1/2018 to 1/4/2018.            Admission Information     Date & Time Provider Department Dept. Phone    1/3/2018 Reggie Ron,  [facility] Preop/Phase -[phone]      Your Vitals Were     Blood Pressure Temperature Respirations Height Weight Pulse Oximetry    142/88 96.8  F (36  C) (Oral) 16 1.88 m (6' 2\") 127.4 kg (280 lb 12.8 oz) 96%    BMI (Body Mass Index)                   36.05 kg/m2           [facility] Information     [employer] gives you secure " access to your electronic health record. If you see a primary care provider, you can also send messages to your care team and make appointments. If you have questions, please call your primary care clinic.  If you do not have a primary care provider, please call [phone] and they will assist you.        Care EveryWhere ID     This is your Care EveryWhere ID. This could be used by other organizations to access your [facility] medical records  [id number]        Equal Access to Services     BUNNY LOMELI : Hadii augusto corbett hadasho Soomaali, waaxda luqadaha, qaybta kaalmada adeegyada, niki dumont. So [facility] [phone].    ATENCIÓN: Si yeni scott, tiene a olvera disposición servicios gratuitos de asistencia lingüística. Llame al [phone].    We comply with applicable federal civil rights laws and [place] laws. We do not discriminate on the basis of race, color, national origin, age, disability, sex, sexual orientation, or gender identity.               Review of your medicines      CONTINUE these medicines which have NOT CHANGED        Dose / Directions    ASPIRIN PO        Dose:  325 mg   Take 325 mg by mouth once   Refills:  0       BABY ASPIRIN 81 MG chewable tablet   Generic drug:  aspirin        Dose:  162 mg   Take 162 mg by mouth daily Reported on 5/12/2017   Refills:  0       BENADRYL PO        Dose:  25 mg   Take 25 mg by mouth   Refills:  0       calcium & magnesium carbonates 311-232 MG Tabs   Indication:  along with zinc        Dose:  1 tablet   Take 1 tablet by mouth daily   Refills:  0       CALCIUM-MAGNESIUM-ZINC PO        Dose:  1 tablet   Take 1 tablet by mouth every other day   Refills:  0       cetirizine 10 MG tablet   Commonly known as:  zyrTEC   Used for:  Hives        Dose:  10 mg   Take 1 tablet (10 mg) by mouth daily   Quantity:  30 tablet   Refills:  1       famotidine 20 MG tablet   Commonly known as:  PEPCID   Used for:  Hives        Dose:  20 mg   Take 1  tablet (20 mg) by mouth 2 times daily   Quantity:  60 tablet   Refills:  1       metroNIDAZOLE 0.75 % topical gel   Commonly known as:  METROGEL   Used for:  Acne rosacea, Lichen simplex chronicus        ,Apply to redness of the face morning and bedtime   Quantity:  45 g   Refills:  3       minocycline 100 MG capsule   Commonly known as:  MINOCIN/DYNACIN   Used for:  Acne rosacea        One capsule h.s. For rosacea   Quantity:  28 capsule   Refills:  3       MULTIVITAMIN PO        Dose:  1 tablet   Take 1 tablet by mouth daily with food.   Refills:  0       OMEGA-3 FISH OIL PO        Dose:  1 g   Take 1 g by mouth every other day   Refills:  0       TYLENOL PO        Dose:  650 mg   Take 650 mg by mouth   Refills:  0       VITAMIN D (CHOLECALCIFEROL) PO        Dose:  4000 Units   Take 4,000 Units by mouth daily   Refills:  0         STOP taking     Na Sulfate-K Sulfate-Mg Sulf solution   Commonly known as:  SUPREP BOWEL PREP                    Protect others around you: Learn how to safely use, store and throw away your medicines at www.disposemymeds.org.             Medication List: This is a list of all your medications and when to take them. Check marks below indicate your daily home schedule. Keep this list as a reference.      Medications           Morning Afternoon Evening Bedtime As Needed    ASPIRIN PO   Take 325 mg by mouth once                                BABY ASPIRIN 81 MG chewable tablet   Take 162 mg by mouth daily Reported on 5/12/2017   Generic drug:  aspirin                                BENADRYL PO   Take 25 mg by mouth                                calcium & magnesium carbonates 311-232 MG Tabs   Take 1 tablet by mouth daily                                CALCIUM-MAGNESIUM-ZINC PO   Take 1 tablet by mouth every other day                                cetirizine 10 MG tablet   Commonly known as:  zyrTEC   Take 1 tablet (10 mg) by mouth daily                                famotidine 20 MG  tablet   Commonly known as:  PEPCID   Take 1 tablet (20 mg) by mouth 2 times daily                                metroNIDAZOLE 0.75 % topical gel   Commonly known as:  METROGEL   ,Apply to redness of the face morning and bedtime                                minocycline 100 MG capsule   Commonly known as:  MINOCIN/DYNACIN   One capsule h.s. For rosacea                                MULTIVITAMIN PO   Take 1 tablet by mouth daily with food.                                OMEGA-3 FISH OIL PO   Take 1 g by mouth every other day                                TYLENOL PO   Take 650 mg by mouth                                VITAMIN D (CHOLECALCIFEROL) PO   Take 4,000 Units by mouth daily

## 2018-01-03 NOTE — PROCEDURES
DATE OF SERVICE:  2018.      PREOPERATIVE DIAGNOSIS:  Screening colon malignant neoplasm.      POSTOPERATIVE DIAGNOSIS:  Normal colonoscopy.      PROCEDURE:  Colonoscopy.      INDICATION:  Screening colonoscopy.      SURGEON:  Ernie Ron DO      DESCRIPTION OF PROCEDURE:  The patient was brought into the endoscopy suite and placed in the left lateral decubitus position.  After preprocedural pause and attended monitored anesthesia was administered, the external anus was inspected and was normal.  Digital rectal exam was normal.  The colonoscope was inserted and advanced under direct visualization to the level of the cecum which was identified by the appendiceal orifice and ileocecal valve.  The terminal ileum was easily intubated.  The colonoscope was advanced approximately a foot up the small intestine.  The mucosa of the terminal ileum was unremarkable.  The colonoscope was withdrawn back into the cecum.  The prep was excellent.  Upon slow withdrawal of the colonoscope, approximately 95% of the colon mucosa was directly visualized.  Throughout the entire colon, including the cecum, ascending, transverse, descending, sigmoid colon, these were all unremarkable, no evidence of polyps, diverticula, inflammation, ulceration, bleeding or AVMs.  The rectum was also unremarkable.  Retroflexion of the rectum was normal.  The extra air was removed from the colon and the colonoscope withdrawn.  The patient tolerated the procedure well and taken to postanesthesia care unit.  Timing for interval colonoscopy should be average risk individual at 10 years.         ERNIE RON DO             D: 2018 07:55   T: 2018 08:04   MT: ROSA      Name:     JUSTUS BILLINGS   MRN:      [id number]        Account:        [id number]   :      1965           Procedure Date: 2018      Document: [id number]

## 2018-01-03 NOTE — DISCHARGE INSTRUCTIONS
INSTRUCTIONS AFTER COLONOSCOPY    WHEN YOU ARE BACK HOME:    Plan to rest for an hour or two after you get home.    You may have some cramping or pressure until you pass gas.    You may resume your regular medications.    Eat a small, light meal at first, and then gradually return to normal meal sizes.  If you had a polyp removed:    Slight bleeding may occur.  You may have a slight blood stain on the toilet paper after a bowel movement.    To lessen the chance of bleeding, avoid heavy exercise for ONE WEEK.  This includes heavy lifting, vigorous sport activities, and heavy physical labor.  You may resume your normal sexual activity.      Avoid aspirin or aspirin products if instructed by your doctor.    WHAT TO WATCH FOR:  Problems rarely occur after the exam; however, it is important for you to watch for early signs of possible problems.  If you have     Unusual pain in your abdomen    Nausea and vomiting that persists    Excessive bleeding    Black or bloody bowel movements    Fever or temperature above 100.6 F  Please call your doctor ([facility] [phone]) or go to the nearest hospital emergency room.    Post-Anesthesia Patient Instructions    IMMEDIATELY FOLLOWING SURGERY:  Do not drive or operate machinery for the first twenty four hours after surgery.  Do not make any important decisions for twenty four hours after surgery or while taking narcotic pain medications or sedatives.  If you develop intractable nausea and vomiting or a severe headache please notify your doctor immediately.    FOLLOW-UP:  Please make an appointment with your surgeon as instructed. You do not need to follow up with anesthesia unless specifically instructed to do so.    WOUND CARE INSTRUCTIONS (if applicable):  Keep a dry clean dressing on the anesthesia/puncture wound site if there is drainage.  Once the wound has quit draining you may leave it open to air.  Generally you should leave the bandage intact for twenty four  hours unless there is drainage.  If the epidural site drains for more than 36-48 hours please call the anesthesia department.    QUESTIONS?:  Please feel free to call your physician or the hospital  if you have any questions, and they will be happy to assist you.

## 2018-01-03 NOTE — ANESTHESIA CARE TRANSFER NOTE
Patient: Rajendra Gagnon    Procedure(s):  COLONOSCOPY - Wound Class: II-Clean Contaminated    Diagnosis: SCREENING  Diagnosis Additional Information: No value filed.    Anesthesia Type:   MAC     Note:  Airway :Nasal Cannula  Patient transferred to:Phase II  Handoff Report: Identifed the Patient, Identified the Reponsible Provider, Reviewed the pertinent medical history, Discussed the surgical course, Reviewed Intra-OP anesthesia mangement and issues during anesthesia, Set expectations for post-procedure period and Allowed opportunity for questions and acknowledgement of understanding      Vitals: (Last set prior to Anesthesia Care Transfer)    CRNA VITALS  1/3/2018 0720 - 1/3/2018 0751      1/3/2018             Pulse: 73    Ht Rate: 77    SpO2: 92 %                Electronically Signed By: QAMAR Mcelroy CRNA  January 3, 2018  7:51 AM

## 2018-01-03 NOTE — BRIEF OP NOTE
[facility] - Brief Operative Note    Pre-operative diagnosis: Screening colon malignant neoplasm   Post-operative diagnosis Normal colonoscopy   Procedure: colonoscopy   Surgeon: Reggie Ron DO   Anesthesia: Monitor Anesthesia Care    Estimated blood loss: 0   Blood transfusion: No transfusion was given during surgery   Drains: 0   Specimens: None   Findings: Normal colon mucosa   Complications: None   Condition: Stable   Comments: Details included in dictated operative note.

## 2018-01-03 NOTE — IP AVS SNAPSHOT
[facility]    [address] [address]    Phone:  [phone]                                       After Visit Summary   1/3/2018    Rajendra Gagnon    MRN: [id number]           After Visit Summary Signature Page     I have received my discharge instructions, and my questions have been answered. I have discussed any challenges I see with this plan with the nurse or doctor.    ..........................................................................................................................................  Patient/Patient Representative Signature      ..........................................................................................................................................  Patient Representative Print Name and Relationship to Patient    ..................................................               ................................................  Date                                            Time    ..........................................................................................................................................  Reviewed by Signature/Title    ...................................................              ..............................................  Date                                                            Time

## 2018-01-04 NOTE — ANESTHESIA POSTPROCEDURE EVALUATION
Patient: Rajendra Gagnon    Procedure(s):  COLONOSCOPY - Wound Class: II-Clean Contaminated    Diagnosis:SCREENING  Diagnosis Additional Information: No value filed.    Anesthesia Type:  MAC    Note:  Anesthesia Post Evaluation    Patient participation: Able to fully participate in evaluation  Level of consciousness: awake  Pain management: adequate  Airway patency: patent  Cardiovascular status: acceptable  Respiratory status: acceptable  Hydration status: acceptable  PONV: none     Anesthetic complications: None          Last vitals:  Vitals:    01/03/18 0810 01/03/18 0815 01/03/18 0825   BP: 105/64 118/75    Resp:      Temp:   97  F (36.1  C)   SpO2: 96% 97%          Electronically Signed By: QAMAR Mcelroy CRNA  January 4, 2018  1:01 PM

## 2018-02-02 DIAGNOSIS — L50.9 HIVES: ICD-10-CM

## 2018-02-02 RX ORDER — CETIRIZINE HYDROCHLORIDE 10 MG/1
10 TABLET ORAL DAILY
Qty: 90 TABLET | Refills: 2 | Status: SHIPPED | OUTPATIENT
Start: 2018-02-02 | End: 2019-01-29

## 2018-03-06 ENCOUNTER — DOCUMENTATION ONLY (OUTPATIENT)
Dept: FAMILY MEDICINE | Facility: OTHER | Age: 53
End: 2018-03-06

## 2018-03-14 ENCOUNTER — HOSPITAL ENCOUNTER (EMERGENCY)
Facility: HOSPITAL | Age: 53
Discharge: HOME | End: 2018-03-14
Attending: FAMILY MEDICINE | Admitting: FAMILY MEDICINE
Payer: COMMERCIAL

## 2018-03-14 ENCOUNTER — APPOINTMENT (OUTPATIENT)
Dept: GENERAL RADIOLOGY | Facility: HOSPITAL | Age: 53
End: 2018-03-14
Attending: FAMILY MEDICINE
Payer: COMMERCIAL

## 2018-03-14 VITALS
BODY MASS INDEX: 35.29 KG/M2 | OXYGEN SATURATION: 96 % | SYSTOLIC BLOOD PRESSURE: 113 MMHG | TEMPERATURE: 97.9 F | RESPIRATION RATE: 16 BRPM | HEART RATE: 100 BPM | DIASTOLIC BLOOD PRESSURE: 73 MMHG | HEIGHT: 74 IN | WEIGHT: 275 LBS

## 2018-03-14 DIAGNOSIS — R06.02 SOB (SHORTNESS OF BREATH): Primary | ICD-10-CM

## 2018-03-14 DIAGNOSIS — R07.89 ATYPICAL CHEST PAIN: ICD-10-CM

## 2018-03-14 DIAGNOSIS — R00.2 PALPITATIONS: ICD-10-CM

## 2018-03-14 LAB
ALBUMIN SERPL-MCNC: 4.4 G/DL (ref 3.4–5)
ALBUMIN UR-MCNC: NEGATIVE MG/DL
ALP SERPL-CCNC: 69 U/L (ref 40–150)
ALT SERPL W P-5'-P-CCNC: 57 U/L (ref 0–70)
ANION GAP SERPL CALCULATED.3IONS-SCNC: 8 MMOL/L (ref 3–14)
APPEARANCE UR: CLEAR
AST SERPL W P-5'-P-CCNC: 37 U/L (ref 0–45)
BACTERIA #/AREA URNS HPF: ABNORMAL /HPF
BASOPHILS # BLD AUTO: 0 10E9/L (ref 0–0.2)
BASOPHILS NFR BLD AUTO: 0.7 %
BILIRUB SERPL-MCNC: 0.9 MG/DL (ref 0.2–1.3)
BILIRUB UR QL STRIP: NEGATIVE
BUN SERPL-MCNC: 18 MG/DL (ref 7–30)
CALCIUM SERPL-MCNC: 9.2 MG/DL (ref 8.5–10.1)
CHLORIDE SERPL-SCNC: 102 MMOL/L (ref 94–109)
CO2 SERPL-SCNC: 27 MMOL/L (ref 20–32)
COLOR UR AUTO: ABNORMAL
CREAT SERPL-MCNC: 0.98 MG/DL (ref 0.66–1.25)
DIFFERENTIAL METHOD BLD: NORMAL
EOSINOPHIL # BLD AUTO: 0.1 10E9/L (ref 0–0.7)
EOSINOPHIL NFR BLD AUTO: 1.6 %
ERYTHROCYTE [DISTWIDTH] IN BLOOD BY AUTOMATED COUNT: 12.9 % (ref 10–15)
GFR SERPL CREATININE-BSD FRML MDRD: 81 ML/MIN/1.7M2
GLUCOSE SERPL-MCNC: 91 MG/DL (ref 70–99)
GLUCOSE UR STRIP-MCNC: NEGATIVE MG/DL
HCT VFR BLD AUTO: 46.4 % (ref 40–53)
HGB BLD-MCNC: 16.6 G/DL (ref 13.3–17.7)
HGB UR QL STRIP: NEGATIVE
IMM GRANULOCYTES # BLD: 0 10E9/L (ref 0–0.4)
IMM GRANULOCYTES NFR BLD: 0.5 %
KETONES UR STRIP-MCNC: NEGATIVE MG/DL
LEUKOCYTE ESTERASE UR QL STRIP: NEGATIVE
LYMPHOCYTES # BLD AUTO: 1.2 10E9/L (ref 0.8–5.3)
LYMPHOCYTES NFR BLD AUTO: 20.2 %
MCH RBC QN AUTO: 30.8 PG (ref 26.5–33)
MCHC RBC AUTO-ENTMCNC: 35.8 G/DL (ref 31.5–36.5)
MCV RBC AUTO: 86 FL (ref 78–100)
MONOCYTES # BLD AUTO: 0.4 10E9/L (ref 0–1.3)
MONOCYTES NFR BLD AUTO: 7.2 %
NEUTROPHILS # BLD AUTO: 4.3 10E9/L (ref 1.6–8.3)
NEUTROPHILS NFR BLD AUTO: 69.8 %
NITRATE UR QL: NEGATIVE
NRBC # BLD AUTO: 0 10*3/UL
NRBC BLD AUTO-RTO: 0 /100
PH UR STRIP: 6.5 PH (ref 4.7–8)
PLATELET # BLD AUTO: 178 10E9/L (ref 150–450)
POTASSIUM SERPL-SCNC: 3.7 MMOL/L (ref 3.4–5.3)
PROT SERPL-MCNC: 8 G/DL (ref 6.8–8.8)
RBC # BLD AUTO: 5.39 10E12/L (ref 4.4–5.9)
RBC #/AREA URNS AUTO: 0 /HPF (ref 0–2)
SODIUM SERPL-SCNC: 137 MMOL/L (ref 133–144)
SOURCE: ABNORMAL
SP GR UR STRIP: 1 (ref 1–1.03)
TROPONIN I SERPL-MCNC: <0.015 UG/L (ref 0–0.04)
UROBILINOGEN UR STRIP-MCNC: NORMAL MG/DL (ref 0–2)
WBC # BLD AUTO: 6.2 10E9/L (ref 4–11)
WBC #/AREA URNS AUTO: <1 /HPF (ref 0–5)

## 2018-03-14 PROCEDURE — 25000128 H RX IP 250 OP 636: Performed by: FAMILY MEDICINE

## 2018-03-14 PROCEDURE — 99285 EMERGENCY DEPT VISIT HI MDM: CPT | Mod: Z6 | Performed by: FAMILY MEDICINE

## 2018-03-14 PROCEDURE — 93005 ELECTROCARDIOGRAM TRACING: CPT

## 2018-03-14 PROCEDURE — 80053 COMPREHEN METABOLIC PANEL: CPT | Performed by: FAMILY MEDICINE

## 2018-03-14 PROCEDURE — 74019 RADEX ABDOMEN 2 VIEWS: CPT | Mod: TC

## 2018-03-14 PROCEDURE — 81001 URINALYSIS AUTO W/SCOPE: CPT | Performed by: FAMILY MEDICINE

## 2018-03-14 PROCEDURE — 99285 EMERGENCY DEPT VISIT HI MDM: CPT | Mod: 25

## 2018-03-14 PROCEDURE — 36415 COLL VENOUS BLD VENIPUNCTURE: CPT | Performed by: FAMILY MEDICINE

## 2018-03-14 PROCEDURE — 93010 ELECTROCARDIOGRAM REPORT: CPT | Performed by: INTERNAL MEDICINE

## 2018-03-14 PROCEDURE — 85025 COMPLETE CBC W/AUTO DIFF WBC: CPT | Performed by: FAMILY MEDICINE

## 2018-03-14 PROCEDURE — 96375 TX/PRO/DX INJ NEW DRUG ADDON: CPT

## 2018-03-14 PROCEDURE — 96374 THER/PROPH/DIAG INJ IV PUSH: CPT

## 2018-03-14 PROCEDURE — 84484 ASSAY OF TROPONIN QUANT: CPT | Performed by: FAMILY MEDICINE

## 2018-03-14 RX ORDER — ONDANSETRON 2 MG/ML
8 INJECTION INTRAMUSCULAR; INTRAVENOUS ONCE
Status: COMPLETED | OUTPATIENT
Start: 2018-03-14 | End: 2018-03-14

## 2018-03-14 RX ORDER — METHYLPREDNISOLONE SODIUM SUCCINATE 125 MG/2ML
125 INJECTION, POWDER, LYOPHILIZED, FOR SOLUTION INTRAMUSCULAR; INTRAVENOUS ONCE
Status: COMPLETED | OUTPATIENT
Start: 2018-03-14 | End: 2018-03-14

## 2018-03-14 RX ORDER — LORAZEPAM 2 MG/ML
1 INJECTION INTRAMUSCULAR ONCE
Status: COMPLETED | OUTPATIENT
Start: 2018-03-14 | End: 2018-03-14

## 2018-03-14 RX ORDER — DIPHENHYDRAMINE HYDROCHLORIDE 50 MG/ML
50 INJECTION INTRAMUSCULAR; INTRAVENOUS EVERY 6 HOURS PRN
Status: DISCONTINUED | OUTPATIENT
Start: 2018-03-14 | End: 2018-03-14 | Stop reason: HOSPADM

## 2018-03-14 RX ORDER — DIPHENHYDRAMINE HCL 25 MG
25 CAPSULE ORAL EVERY 6 HOURS PRN
Status: DISCONTINUED | OUTPATIENT
Start: 2018-03-14 | End: 2018-03-14 | Stop reason: HOSPADM

## 2018-03-14 RX ADMIN — ONDANSETRON 8 MG: 2 INJECTION INTRAMUSCULAR; INTRAVENOUS at 15:48

## 2018-03-14 RX ADMIN — DIPHENHYDRAMINE HYDROCHLORIDE 50 MG: 50 INJECTION, SOLUTION INTRAMUSCULAR; INTRAVENOUS at 13:13

## 2018-03-14 RX ADMIN — LORAZEPAM 1 MG: 2 INJECTION, SOLUTION INTRAMUSCULAR; INTRAVENOUS at 13:38

## 2018-03-14 RX ADMIN — METHYLPREDNISOLONE SODIUM SUCCINATE 125 MG: 125 INJECTION, POWDER, FOR SOLUTION INTRAMUSCULAR; INTRAVENOUS at 15:43

## 2018-03-14 NOTE — ED AVS SNAPSHOT
[facility]    [address]    [place] [address]    Phone:  [phone]                                       Rajendra Gagnon   MRN: [id number]    Department:  [facility]   Date of Visit:  3/14/2018           After Visit Summary Signature Page     I have received my discharge instructions, and my questions have been answered. I have discussed any challenges I see with this plan with the nurse or doctor.    ..........................................................................................................................................  Patient/Patient Representative Signature      ..........................................................................................................................................  Patient Representative Print Name and Relationship to Patient    ..................................................               ................................................  Date                                            Time    ..........................................................................................................................................  Reviewed by Signature/Title    ...................................................              ..............................................  Date                                                            Time

## 2018-03-14 NOTE — ED NOTES
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 Pt reports he had a sudden episode of a flushed feeling with dizziness and just not feeling right.  Pt denies any pain.

## 2018-03-14 NOTE — LETTER
March 14, 2018      To Whom It May Concern:      Rajendra Gagnon was seen in our Emergency Department today, 03/14/18.  I expect his condition to improve over the next 1 daydays.  He may return to work/school when improved.    Sincerely,        Gege Goins MD

## 2018-03-14 NOTE — ED NOTES
"Pt called stating his rash was worse.  Increased raised hives on face from neck up. Denies any difficulty breathing.  Pt is pt pacing back and forth in room, anxious with monitors off \"I need room to move around.\"  "

## 2018-03-14 NOTE — DISCHARGE INSTRUCTIONS
Rajendra,    Take the Xanax that was prescribed for you by Gary.  Dr. Mckeon has ordered a stress test for you.  They will call you at home with details.

## 2018-03-14 NOTE — ED AVS SNAPSHOT
[facility]    [address]    [place] [address]    Phone:  [phone]                                       Rajendra Gagnon   MRN: [id number]    Department:  [facility]   Date of Visit:  3/14/2018           Patient Information     Date Of Birth          1965        Your diagnoses for this visit were:     Palpitations        You were seen by Gege Goisn MD.        Discharge Instructions       Rajendra    Take the Xanax that was prescribed for you by Gary.  Dr. Mckeon has ordered a stress test for you.  They will call you at home with details.       Review of your medicines      Our records show that you are taking the medicines listed below. If these are incorrect, please call your family doctor or clinic.        Dose / Directions Last dose taken    BENADRYL PO   Dose:  25 mg        Take 25 mg by mouth   Refills:  0        calcium & magnesium carbonates 311-232 MG Tabs   Dose:  1 tablet   Indication:  along with zinc        Take 1 tablet by mouth daily   Refills:  0        CALCIUM-MAGNESIUM-ZINC PO   Dose:  1 tablet        Take 1 tablet by mouth every other day   Refills:  0        cetirizine 10 MG tablet   Commonly known as:  zyrTEC   Dose:  10 mg   Quantity:  90 tablet        Take 1 tablet (10 mg) by mouth daily   Refills:  2        metroNIDAZOLE 0.75 % topical gel   Commonly known as:  METROGEL   Quantity:  45 g        ,Apply to redness of the face morning and bedtime   Refills:  3        minocycline 100 MG capsule   Commonly known as:  MINOCIN/DYNACIN   Quantity:  28 capsule        One capsule h.s. For rosacea   Refills:  3        MULTIVITAMIN PO   Dose:  1 tablet        Take 1 tablet by mouth daily with food.   Refills:  0        OMEGA-3 FISH OIL PO   Dose:  1 g        Take 1 g by mouth every other day   Refills:  0        PEPCID PO   Dose:  20 mg        Take 20 mg by mouth as needed for heartburn   Refills:  0        TYLENOL PO   Dose:  650 mg        Take 650 mg  by mouth   Refills:  0        VITAMIN D (CHOLECALCIFEROL) PO   Dose:  4000 Units        Take 4,000 Units by mouth daily   Refills:  0                Procedures and tests performed during your visit     CBC with platelets differential    Comprehensive metabolic panel    EKG 12-lead, tracing only    Metanephrine random or 24 hr urine    Troponin I    UA with Microscopic    XR Abdomen 2 Views      Orders Needing Specimen Collection     None      Pending Results     No orders found from 3/12/2018 to 3/15/2018.            Pending Culture Results     No orders found from 3/12/2018 to 3/15/2018.            Thank you for choosing [facility]       Thank you for choosing [facility] for your care. Our goal is always to provide you with excellent care. Hearing back from our patients is one way we can continue to improve our services. Please take a few minutes to complete the written survey that you may receive in the mail after you visit with us. Thank you!        [facility] Information     [employer] gives you secure access to your electronic health record. If you see a primary care provider, you can also send messages to your care team and make appointments. If you have questions, please call your primary care clinic.  If you do not have a primary care provider, please call [phone] and they will assist you.        Care EveryWhere ID     This is your Care EveryWhere ID. This could be used by other organizations to access your [facility] medical records  [id number]        Equal Access to Services     BUNNY LOMELI : Josefa Villegas, waaxda luqadaha, qaybta kaalmada ademarlin, niki dumont. So [facility] [phone].    ATENCIÓN: Si habla español, tiene a olvera disposición servicios gratuitos de asistencia lingüística. Llame al [phone].    We comply with applicable federal civil rights laws and [place] laws. We do not discriminate on the basis of race, color, national origin, age, disability,  sex, sexual orientation, or gender identity.            After Visit Summary       This is your record. Keep this with you and show to your community pharmacist(s) and doctor(s) at your next visit.

## 2018-03-14 NOTE — ED PROVIDER NOTES
"eMERGENCY dEPARTMENT eNCOUnter        CHIEF COMPLAINT    Chief Complaint   Patient presents with     Dizziness     Anxiety       HPI    Rajendra Gagnon is a 52 year old male who presents from hospitals after he \"felt flushed and a rushing in his head and anxious.  Currently has been in here twice before within the last 6 months with similar symptoms.  It appears that always occurs at work.  He reports is under a lot of stress right now.  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  EMS was contacted and evaluated him on scene 12-lead there was normal [facility] transported him here he had normal vital signs in route.      REVIEW OF SYSTEMS    Cardiac: no chest Pain, positive for palpitations, no syncope  Respiratory: No cough or hemoptysis  GI: No Vomiting or Diarrhea  : No Dysuria or Hematuria  General: No Fever or Chills  All other systems reviewed and are negative.    PAST MEDICAL & SURGICAL HISTORY    Past Medical History:   Diagnosis Date     Dysthymia 3/26/2012     Hearing loss in right ear 3/14/2012     History of atrial fibrillation 4/23/2012     Hypertriglyceridemia      Right acoustic neuroma 3/14/2012     Tinnitus, unspecified 3/14/2012     Umbilical hernia without obstruction and without gangrene 5/11/2016     Varicose veins      Varicose veins of both lower extremities 5/12/2017     Past Surgical History:   Procedure Laterality Date     acustic neuroma removed  3/2007    right     COLONOSCOPY N/A 1/3/2018    Procedure: COLONOSCOPY;  COLONOSCOPY;  Surgeon: Reggie Ron DO;  Location: [facility] OR     funnel chest[      states had chest reconstruction     HERNIA REPAIR       ORTHOPEDIC SURGERY      right knee surgery     PE TUBES         CURRENT MEDICATIONS    Current Outpatient Rx   Medication Sig Dispense Refill     Famotidine (PEPCID PO) Take 20 mg by mouth as needed for heartburn       cetirizine (ZYRTEC) 10 MG tablet " Take 1 tablet (10 mg) by mouth daily 90 tablet 2     VITAMIN D, CHOLECALCIFEROL, PO Take 4,000 Units by mouth daily       CALCIUM-MAGNESIUM-ZINC PO Take 1 tablet by mouth every other day       metroNIDAZOLE (METROGEL) 0.75 % topical gel ,Apply to redness of the face morning and bedtime 45 g 3     minocycline (MINOCIN/DYNACIN) 100 MG capsule One capsule h.s. For rosacea 28 capsule 3     DiphenhydrAMINE HCl (BENADRYL PO) Take 25 mg by mouth       Omega-3 Fatty Acids (OMEGA-3 FISH OIL PO) Take 1 g by mouth every other day       calcium & magnesium carbonates 311-232 MG TABS Take 1 tablet by mouth daily        Multiple Vitamins-Minerals (MULTIVITAMIN OR) Take 1 tablet by mouth daily with food.       Acetaminophen (TYLENOL PO) Take 650 mg by mouth         ALLERGIES    Allergies   Allergen Reactions     Aspartame      Venlafaxine Other (See Comments)     Night terrors     Zoloft [Sertraline] Other (See Comments)     Head rush, hives and agitation       SOCIAL & FAMILY HISTORY    Social History     Social History     Marital status:      Spouse name: N/A     Number of children: N/A     Years of education: N/A     Occupational History     Maintanance /Oiler      [employer]     Social History Main Topics     Smoking status: Never Smoker     Smokeless tobacco: Never Used     Alcohol use 2.0 oz/week     4 Cans of beer per week      Comment: rarely     Drug use: No     Sexual activity: Not on file     Other Topics Concern     Caffeine Concern Yes     2 cups coffee daily     Parent/Sibling W/ Cabg, Mi Or Angioplasty Before 65f 55m? No     Social History Narrative     Family History   Problem Relation Age of Onset     DIABETES Mother      type 2     Hypertension Mother      HEART DISEASE Mother      Lipids Mother      Alcohol/Drug Father      CANCER Maternal Grandmother      Eye Disorder Maternal Grandfather      HEART DISEASE Maternal Grandfather      CANCER Maternal Grandfather      CANCER Paternal Grandmother   "    Prostate Cancer Paternal Grandfather      Other - See Comments Daughter      lupus     KIDNEY DISEASE Daughter      KIDNEY DISEASE Daughter        PHYSICAL EXAM    VITAL SIGNS: /73  Pulse 100  Temp 97.9  F (36.6  C) (Tympanic)  Resp 16  Ht 1.88 m (6' 2\")  Wt 124.7 kg (275 lb)  SpO2 96%  BMI 35.31 kg/m2   Constitutional:  Well developed, well nourished, no acute distress , he is very anxious  HENT:  Atraumatic, moist mucus membranes  Neck: supple, no JVD   Respiratory:  Lungs Clear, no retractions   Cardiovascular:  regular rate, no murmurs  Vascular: Radial pulses 2+ equal bilaterally  GI:  Soft, nontender, normal bowel sounds  Musculoskeletal:  No edema, no acute deformities  Integument:  Skin warm and dry, no petechiae, after arriving he became increasingly anxious and developed urticaria   Neurologic:  Alert & oriented, no slurred speech  Psych: Pleasant affect, no hallucinations    EKG    NSR, no ischemic changes      ED COURSE & MEDICAL DECISION MAKING      See chart for details of medications given during the ED stay.    Vitals:    03/14/18 1530 03/14/18 1545 03/14/18 1600 03/14/18 1636   BP: 141/94 124/89 126/86 113/73   Pulse:    100   Resp:    16   Temp:       TempSrc:       SpO2:  96% 97% 96%   Weight:       Height:             FINAL IMPRESSION    1. Palpitations        Plan: Rajendra has had an extensive workup both in the emergency department and by   for these episodes.  He said today he felt \"head rush\" and then felt like he had to get up and run around.  At one point here during his visit he was in the room pacing.  He is fixated on various symptoms including possible tick bites and a spider bite that \"left an indentation in my leg\" during a visit to [place] emergency department he had been discharged with Xanax.  He has not taken that.  I did order a 24-hour urine for catecholamines and spoke to , who will order an outpatient stress test.  At this point the " episodes appear to be anxiety related rather than cardiac.        (Please note that this note was completed with a voice recognition program.  Every attempt was made to edit the dictations, but inevitably there remain words that are mis-transcribed.)       Gege Goins MD  03/14/18 1946

## 2018-03-14 NOTE — ED NOTES
Discharge instructions reviewed with patient and spouse and they verbalize understanding.   Discharged home with spouse.  Encouraged follow up with Dr. Mckeon.  Patient sent home with 24 hour urine collection and instructions.

## 2018-03-16 ENCOUNTER — APPOINTMENT (OUTPATIENT)
Dept: LAB | Facility: HOSPITAL | Age: 53
End: 2018-03-16
Payer: COMMERCIAL

## 2018-03-16 PROCEDURE — 83835 ASSAY OF METANEPHRINES: CPT

## 2018-03-20 LAB
COLLECT DURATION TIME UR: 24 HR
CREAT 24H UR-MRATE: 2304 MG/D (ref 1000–2500)
CREAT SERPL-MCNC: 180 MG/DL
METANEPH 24H UR-MCNC: 79 UG/L
METANEPH 24H UR-MRATE: 101 UG/D (ref 62–207)
METANEPH+NORMETANEPH UR-IMP: NORMAL
METANEPH/CREAT 24H UR: 44 UG/G CRT (ref 0–300)
NORMETANEPHRINE 24H UR-MCNC: 295 UG/L
NORMETANEPHRINE 24H UR-MRATE: 378 UG/D (ref 125–510)
NORMETANEPHRINE/CREAT 24H UR: 164 UG/G CRT (ref 0–400)
SPECIMEN VOL ?TM UR: 1280 ML

## 2019-01-29 ENCOUNTER — OFFICE VISIT (OUTPATIENT)
Dept: FAMILY MEDICINE | Facility: OTHER | Age: 54
End: 2019-01-29
Attending: FAMILY MEDICINE
Payer: COMMERCIAL

## 2019-01-29 ENCOUNTER — ANCILLARY PROCEDURE (OUTPATIENT)
Dept: GENERAL RADIOLOGY | Facility: OTHER | Age: 54
End: 2019-01-29
Attending: FAMILY MEDICINE
Payer: COMMERCIAL

## 2019-01-29 VITALS
HEART RATE: 98 BPM | DIASTOLIC BLOOD PRESSURE: 80 MMHG | OXYGEN SATURATION: 99 % | WEIGHT: 260 LBS | TEMPERATURE: 97.8 F | BODY MASS INDEX: 33.38 KG/M2 | SYSTOLIC BLOOD PRESSURE: 130 MMHG

## 2019-01-29 DIAGNOSIS — R10.31 RLQ ABDOMINAL PAIN: Primary | ICD-10-CM

## 2019-01-29 DIAGNOSIS — R10.31 RLQ ABDOMINAL PAIN: ICD-10-CM

## 2019-01-29 LAB
ALBUMIN UR-MCNC: NEGATIVE MG/DL
APPEARANCE UR: CLEAR
BACTERIA #/AREA URNS HPF: ABNORMAL /HPF
BASOPHILS # BLD AUTO: 0 10E9/L (ref 0–0.2)
BASOPHILS NFR BLD AUTO: 0.5 %
BILIRUB UR QL STRIP: NEGATIVE
COLOR UR AUTO: ABNORMAL
CRP SERPL-MCNC: 4 MG/L (ref 0–8)
DIFFERENTIAL METHOD BLD: NORMAL
EOSINOPHIL # BLD AUTO: 0.1 10E9/L (ref 0–0.7)
EOSINOPHIL NFR BLD AUTO: 1.5 %
ERYTHROCYTE [DISTWIDTH] IN BLOOD BY AUTOMATED COUNT: 12.9 % (ref 10–15)
GLUCOSE UR STRIP-MCNC: NEGATIVE MG/DL
HCT VFR BLD AUTO: 45.5 % (ref 40–53)
HGB BLD-MCNC: 15.8 G/DL (ref 13.3–17.7)
HGB UR QL STRIP: NEGATIVE
IMM GRANULOCYTES # BLD: 0 10E9/L (ref 0–0.4)
IMM GRANULOCYTES NFR BLD: 0.3 %
KETONES UR STRIP-MCNC: NEGATIVE MG/DL
LEUKOCYTE ESTERASE UR QL STRIP: NEGATIVE
LYMPHOCYTES # BLD AUTO: 1.4 10E9/L (ref 0.8–5.3)
LYMPHOCYTES NFR BLD AUTO: 20.6 %
MCH RBC QN AUTO: 30.1 PG (ref 26.5–33)
MCHC RBC AUTO-ENTMCNC: 34.7 G/DL (ref 31.5–36.5)
MCV RBC AUTO: 87 FL (ref 78–100)
MONOCYTES # BLD AUTO: 0.6 10E9/L (ref 0–1.3)
MONOCYTES NFR BLD AUTO: 9.3 %
NEUTROPHILS # BLD AUTO: 4.4 10E9/L (ref 1.6–8.3)
NEUTROPHILS NFR BLD AUTO: 67.8 %
NITRATE UR QL: NEGATIVE
NRBC # BLD AUTO: 0 10*3/UL
NRBC BLD AUTO-RTO: 0 /100
PH UR STRIP: 7 PH (ref 4.7–8)
PLATELET # BLD AUTO: 181 10E9/L (ref 150–450)
RBC # BLD AUTO: 5.25 10E12/L (ref 4.4–5.9)
RBC #/AREA URNS AUTO: 0 /HPF (ref 0–2)
SOURCE: ABNORMAL
SP GR UR STRIP: 1 (ref 1–1.03)
UROBILINOGEN UR STRIP-MCNC: NORMAL MG/DL (ref 0–2)
WBC # BLD AUTO: 6.6 10E9/L (ref 4–11)
WBC #/AREA URNS AUTO: 1 /HPF (ref 0–5)

## 2019-01-29 PROCEDURE — 85025 COMPLETE CBC W/AUTO DIFF WBC: CPT | Performed by: FAMILY MEDICINE

## 2019-01-29 PROCEDURE — 86140 C-REACTIVE PROTEIN: CPT | Performed by: FAMILY MEDICINE

## 2019-01-29 PROCEDURE — 74019 RADEX ABDOMEN 2 VIEWS: CPT | Mod: TC

## 2019-01-29 PROCEDURE — 81001 URINALYSIS AUTO W/SCOPE: CPT | Performed by: FAMILY MEDICINE

## 2019-01-29 PROCEDURE — 99214 OFFICE O/P EST MOD 30 MIN: CPT | Performed by: FAMILY MEDICINE

## 2019-01-29 PROCEDURE — 36415 COLL VENOUS BLD VENIPUNCTURE: CPT | Performed by: FAMILY MEDICINE

## 2019-01-29 RX ORDER — CYANOCOBALAMIN (VITAMIN B-12) 500 MCG
400 LOZENGE ORAL DAILY
COMMUNITY

## 2019-01-29 ASSESSMENT — PAIN SCALES - GENERAL: PAINLEVEL: NO PAIN (0)

## 2019-01-29 NOTE — NURSING NOTE
"Chief Complaint   Patient presents with     Pain       Initial /80   Pulse 98   Temp 97.8  F (36.6  C)   Wt 117.9 kg (260 lb)   SpO2 99%   BMI 33.38 kg/m   Estimated body mass index is 33.38 kg/m  as calculated from the following:    Height as of 3/14/18: 1.88 m (6' 2\").    Weight as of this encounter: 117.9 kg (260 lb).  Medication Reconciliation: complete    Juan A Vitale LPN  "

## 2019-01-29 NOTE — PROGRESS NOTES
"  SUBJECTIVE:   Rajendra Gagnon is a 53 year old male who presents to clinic today for the following health issues:      Abdominal Pain      Duration: couple days/ worse in last 2 days and none today    Description (location/character/radiation): right sided to navel         Associated flank pain: None, does have umbilical hernia is wondering if has hernia on right side    Intensity:  moderate    Accompanying signs and symptoms:        Fever/Chills: no        Gas/Bloating: YES- gas       Nausea/vomitting: YES- nausea       Diarrhea: no        Dysuria or Hematuria: no     History (previous similar pain/trauma/previous testing): none    Precipitating or alleviating factors:       Pain worse with eating/BM/urination: none       Pain relieved by BM: no     Therapies tried and outcome: None    LMP:  not applicable    Last night - increase pain 8-9/10     Today better    Pain seems superfical \" in the muscle'    Pass gas did make it better    BM - some better     No rash     Skin not sensitive     Pain in RLQ     Colonoscopy normal and UTD              Problem list and histories reviewed & adjusted, as indicated.  Additional history: as documented    Labs reviewed in EPIC    Reviewed and updated as needed this visit by clinical staff  Tobacco  Allergies  Meds  Med Hx  Surg Hx  Fam Hx  Soc Hx      Reviewed and updated as needed this visit by Provider         ROS:  CONSTITUTIONAL: NEGATIVE for fever, chills, change in weight  RESP: NEGATIVE for significant cough or SOB  CV: NEGATIVE for chest pain, palpitations or peripheral edema  ROS otherwise negative    OBJECTIVE:                                                    /80   Pulse 98   Temp 97.8  F (36.6  C)   Wt 117.9 kg (260 lb)   SpO2 99%   BMI 33.38 kg/m    Body mass index is 33.38 kg/m .   GENERAL: healthy, alert, well nourished, well hydrated, no distress  NECK: no tenderness, no adenopathy, no asymmetry, no masses, no stiffness; thyroid- normal to " palpation  RESP: lungs clear to auscultation - no rales, no rhonchi, no wheezes  CV: regular rates and rhythm, normal S1 S2, no S3 or S4 and no murmur, no click or rub -  ABDOMEN: soft, no tenderness, no  hepatosplenomegaly, no masses, normal bowel sounds  MS: extremities- no gross deformities noted, no edema    Results for orders placed or performed in visit on 01/29/19   CRP inflammation   Result Value Ref Range    CRP Inflammation 4.0 0.0 - 8.0 mg/L   CBC with platelets differential   Result Value Ref Range    WBC 6.6 4.0 - 11.0 10e9/L    RBC Count 5.25 4.4 - 5.9 10e12/L    Hemoglobin 15.8 13.3 - 17.7 g/dL    Hematocrit 45.5 40.0 - 53.0 %    MCV 87 78 - 100 fl    MCH 30.1 26.5 - 33.0 pg    MCHC 34.7 31.5 - 36.5 g/dL    RDW 12.9 10.0 - 15.0 %    Platelet Count 181 150 - 450 10e9/L    Diff Method Automated Method     % Neutrophils 67.8 %    % Lymphocytes 20.6 %    % Monocytes 9.3 %    % Eosinophils 1.5 %    % Basophils 0.5 %    % Immature Granulocytes 0.3 %    Nucleated RBCs 0 0 /100    Absolute Neutrophil 4.4 1.6 - 8.3 10e9/L    Absolute Lymphocytes 1.4 0.8 - 5.3 10e9/L    Absolute Monocytes 0.6 0.0 - 1.3 10e9/L    Absolute Eosinophils 0.1 0.0 - 0.7 10e9/L    Absolute Basophils 0.0 0.0 - 0.2 10e9/L    Abs Immature Granulocytes 0.0 0 - 0.4 10e9/L    Absolute Nucleated RBC 0.0    UA with Microscopic reflex to Culture   Result Value Ref Range    Color Urine Straw     Appearance Urine Clear     Glucose Urine Negative NEG^Negative mg/dL    Bilirubin Urine Negative NEG^Negative    Ketones Urine Negative NEG^Negative mg/dL    Specific Gravity Urine 1.005 1.003 - 1.035    Blood Urine Negative NEG^Negative    pH Urine 7.0 4.7 - 8.0 pH    Protein Albumin Urine Negative NEG^Negative mg/dL    Urobilinogen mg/dL Normal 0.0 - 2.0 mg/dL    Nitrite Urine Negative NEG^Negative    Leukocyte Esterase Urine Negative NEG^Negative    Source Midstream Urine     WBC Urine 1 0 - 5 /HPF    RBC Urine 0 0 - 2 /HPF    Bacteria Urine None (A)  NEG^Negative /HPF     AXR unremarkable      ASSESSMENT/PLAN:                                                        ICD-10-CM    1. RLQ abdominal pain R10.31 CRP inflammation     CBC with platelets differential     UA with Microscopic reflex to Culture     XR Abdomen 2 Views     Seems MSK in nature.  Possible gas pains. No s/s of infection and labs are unremarkable.  Watch for rash discussed.  Discussed in length conservative measures of OTC medications for pain, Ice/Heat, elevation and the concept of R.I.C.E.. Continue behavioral changes and proper body mechanics to allow for healing. Follow up as directed.   Symptomatic treatment was discussed along when patient should call and/or come back into the clinic or go to ER/Urgent care. All questions answered.   IF worse - needs to be seen. Still small chance of start of early appy but doubt.        See Patient Instructions    Bhupinder Mckeon MD  [facility]

## 2019-01-30 ENCOUNTER — OFFICE VISIT (OUTPATIENT)
Dept: FAMILY MEDICINE | Facility: OTHER | Age: 54
End: 2019-01-30
Attending: FAMILY MEDICINE
Payer: COMMERCIAL

## 2019-01-30 VITALS
WEIGHT: 260 LBS | TEMPERATURE: 97.1 F | HEIGHT: 74 IN | BODY MASS INDEX: 33.37 KG/M2 | DIASTOLIC BLOOD PRESSURE: 68 MMHG | OXYGEN SATURATION: 96 % | SYSTOLIC BLOOD PRESSURE: 118 MMHG | HEART RATE: 83 BPM

## 2019-01-30 DIAGNOSIS — E78.1 HYPERTRIGLYCERIDEMIA: ICD-10-CM

## 2019-01-30 DIAGNOSIS — R97.20 ELEVATED PROSTATE SPECIFIC ANTIGEN (PSA): ICD-10-CM

## 2019-01-30 DIAGNOSIS — Z12.5 SCREENING FOR PROSTATE CANCER: ICD-10-CM

## 2019-01-30 DIAGNOSIS — K42.9 UMBILICAL HERNIA WITHOUT OBSTRUCTION AND WITHOUT GANGRENE: ICD-10-CM

## 2019-01-30 DIAGNOSIS — Z00.00 ANNUAL PHYSICAL EXAM: Primary | ICD-10-CM

## 2019-01-30 DIAGNOSIS — F43.23 ADJUSTMENT DISORDER WITH MIXED ANXIETY AND DEPRESSED MOOD: ICD-10-CM

## 2019-01-30 LAB
ALBUMIN SERPL-MCNC: 4.1 G/DL (ref 3.4–5)
ALP SERPL-CCNC: 70 U/L (ref 40–150)
ALT SERPL W P-5'-P-CCNC: 37 U/L (ref 0–70)
ANION GAP SERPL CALCULATED.3IONS-SCNC: 4 MMOL/L (ref 3–14)
AST SERPL W P-5'-P-CCNC: 20 U/L (ref 0–45)
BILIRUB SERPL-MCNC: 0.9 MG/DL (ref 0.2–1.3)
BUN SERPL-MCNC: 17 MG/DL (ref 7–30)
CALCIUM SERPL-MCNC: 8.3 MG/DL (ref 8.5–10.1)
CHLORIDE SERPL-SCNC: 107 MMOL/L (ref 94–109)
CHOLEST SERPL-MCNC: 178 MG/DL
CO2 SERPL-SCNC: 28 MMOL/L (ref 20–32)
CREAT SERPL-MCNC: 1.04 MG/DL (ref 0.66–1.25)
GFR SERPL CREATININE-BSD FRML MDRD: 82 ML/MIN/{1.73_M2}
GLUCOSE SERPL-MCNC: 93 MG/DL (ref 70–99)
HDLC SERPL-MCNC: 33 MG/DL
LDLC SERPL CALC-MCNC: 114 MG/DL
NONHDLC SERPL-MCNC: 145 MG/DL
POTASSIUM SERPL-SCNC: 4 MMOL/L (ref 3.4–5.3)
PROT SERPL-MCNC: 7.1 G/DL (ref 6.8–8.8)
PSA SERPL-ACNC: 6.39 UG/L (ref 0–4)
SODIUM SERPL-SCNC: 139 MMOL/L (ref 133–144)
TRIGL SERPL-MCNC: 156 MG/DL

## 2019-01-30 PROCEDURE — 80061 LIPID PANEL: CPT | Performed by: FAMILY MEDICINE

## 2019-01-30 PROCEDURE — 99396 PREV VISIT EST AGE 40-64: CPT | Performed by: FAMILY MEDICINE

## 2019-01-30 PROCEDURE — 84153 ASSAY OF PSA TOTAL: CPT | Performed by: FAMILY MEDICINE

## 2019-01-30 PROCEDURE — 36415 COLL VENOUS BLD VENIPUNCTURE: CPT | Performed by: FAMILY MEDICINE

## 2019-01-30 PROCEDURE — 80053 COMPREHEN METABOLIC PANEL: CPT | Performed by: FAMILY MEDICINE

## 2019-01-30 ASSESSMENT — ANXIETY QUESTIONNAIRES
1. FEELING NERVOUS, ANXIOUS, OR ON EDGE: SEVERAL DAYS
IF YOU CHECKED OFF ANY PROBLEMS ON THIS QUESTIONNAIRE, HOW DIFFICULT HAVE THESE PROBLEMS MADE IT FOR YOU TO DO YOUR WORK, TAKE CARE OF THINGS AT HOME, OR GET ALONG WITH OTHER PEOPLE: NOT DIFFICULT AT ALL
3. WORRYING TOO MUCH ABOUT DIFFERENT THINGS: SEVERAL DAYS
4. TROUBLE RELAXING: NOT AT ALL
GAD7 TOTAL SCORE: 3
6. BECOMING EASILY ANNOYED OR IRRITABLE: NOT AT ALL
5. BEING SO RESTLESS THAT IT IS HARD TO SIT STILL: NOT AT ALL
2. NOT BEING ABLE TO STOP OR CONTROL WORRYING: NOT AT ALL
7. FEELING AFRAID AS IF SOMETHING AWFUL MIGHT HAPPEN: SEVERAL DAYS

## 2019-01-30 ASSESSMENT — PAIN SCALES - GENERAL: PAINLEVEL: MILD PAIN (2)

## 2019-01-30 ASSESSMENT — MIFFLIN-ST. JEOR: SCORE: 2104.1

## 2019-01-30 ASSESSMENT — PATIENT HEALTH QUESTIONNAIRE - PHQ9: SUM OF ALL RESPONSES TO PHQ QUESTIONS 1-9: 4

## 2019-01-30 NOTE — PATIENT INSTRUCTIONS
Preventive Health Recommendations  Male Ages 50 - 64    Yearly exam:             See your health care provider every year in order to  o   Review health changes.   o   Discuss preventive care.    o   Review your medicines if your doctor has prescribed any.     Have a cholesterol test every 5 years, or more frequently if you are at risk for high cholesterol/heart disease.     Have a diabetes test (fasting glucose) every three years. If you are at risk for diabetes, you should have this test more often.     Have a colonoscopy at age 50, or have a yearly FIT test (stool test). These exams will check for colon cancer.      Talk with your health care provider about whether or not a prostate cancer screening test (PSA) is right for you.    You should be tested each year for STDs (sexually transmitted diseases), if you re at risk.     Shots: Get a flu shot each year. Get a tetanus shot every 10 years.     Nutrition:    Eat at least 5 servings of fruits and vegetables daily.     Eat whole-grain bread, whole-wheat pasta and brown rice instead of white grains and rice.     Get adequate Calcium and Vitamin D.     Lifestyle    Exercise for at least 150 minutes a week (30 minutes a day, 5 days a week). This will help you control your weight and prevent disease.     Limit alcohol to one drink per day.     No smoking.     Wear sunscreen to prevent skin cancer.     See your dentist every six months for an exam and cleaning.     See your eye doctor every 1 to 2 years.    Results for orders placed or performed in visit on 01/30/19   Prostate spec antigen screen   Result Value Ref Range    PSA 6.39 (H) 0 - 4 ug/L   Lipid Profile   Result Value Ref Range    Cholesterol 178 <200 mg/dL    Triglycerides 156 (H) <150 mg/dL    HDL Cholesterol 33 (L) >39 mg/dL    LDL Cholesterol Calculated 114 (H) <100 mg/dL    Non HDL Cholesterol 145 (H) <130 mg/dL   Comprehensive metabolic panel   Result Value Ref Range    Sodium 139 133 - 144 mmol/L     Potassium 4.0 3.4 - 5.3 mmol/L    Chloride 107 94 - 109 mmol/L    Carbon Dioxide 28 20 - 32 mmol/L    Anion Gap 4 3 - 14 mmol/L    Glucose 93 70 - 99 mg/dL    Urea Nitrogen 17 7 - 30 mg/dL    Creatinine 1.04 0.66 - 1.25 mg/dL    GFR Estimate 82 >60 mL/min/[1.73_m2]    GFR Estimate If Black >90 >60 mL/min/[1.73_m2]    Calcium 8.3 (L) 8.5 - 10.1 mg/dL    Bilirubin Total 0.9 0.2 - 1.3 mg/dL    Albumin 4.1 3.4 - 5.0 g/dL    Protein Total 7.1 6.8 - 8.8 g/dL    Alkaline Phosphatase 70 40 - 150 U/L    ALT 37 0 - 70 U/L    AST 20 0 - 45 U/L         Patient Education     Hernia (Adult)    A hernia can happen when there is a weakness or defect in the wall of the abdomen or groin. Intestines or nearby tissues may move from their usual location and push through the weakness in the wall. This can cause a hernia (bulge) you may see or feel.  Causes and risk factors   A hernia may be present at birth. Or it may be caused by the wear and tear of daily living. Certain factors can make a hernia more likely. These can include:    Heavy lifting    Straining, whether from lifting, movement, or constipation    Chronic cough    Injury to the abdominal wall    Excess weight    Pregnancy    Prior surgery    Older age    Family history of hernia  Symptoms  Symptoms of a hernia may come on suddenly. Or they may appear slowly over time. Some common symptoms include:    Bulge in the groin area, around the navel, or in the scrotum (the bulge may get bigger when you stand and go away when you lie down)    Pain or pressure around the bulge    Pain during activities such as lifting, coughing, or sneezing    A feeling of weakness or pressure in the groin    Pain or swelling in the scrotum  Types of hernias  There are different types of hernia. The type you have depends on its location:    Inguinal. This type is in the groin or scrotum. It is more common in men. But, women can get this hernia, too.    Femoral. This type is in the groin, upper thigh  (where the leg bends), or labia. It is more common in women.    Ventral. This type is in the abdominal wall.    Umbilical. This type occurs around the navel (belly button).    Incisional. This type occurs at the site of a previous surgery.  The condition of the hernia can help determine how urgently it needs to be treated.    Reducible. It goes back in by itself, or it can be pushed back in.    Irreducible. It can t be pushed back in.    Incarcerated/strangulated. The intestine is trapped (incarcerated). If this happens, you won t be able to push the bulge back in. If the incarcerated hernia isn t treated, it may become strangulated. This means the area loses blood supply and the tissue may die. This requires emergency surgery. You need treatment right away.  In most cases, a hernia will not heal on its own.You may need surgery to repair the defect in the abdominal wall or groin. You ll be told more about surgery, if needed.  If your symptoms are not severe, treatment may sometimes be delayed. In such cases, you will need regular follow-up visits with the provider. You ll be asked to keep track of your symptoms and to watch for signs of more serious problems. You may also be given guidelines similar to the home care instructions below.  Home care  To help keep a hernia from getting worse, you may be advised to:    Avoid heavy lifting and straining as directed.    Take steps to prevent constipation, such as eating more fiber and drinking more water. This may help reduce straining that can occur when having a bowel movement. Reducing straining may help keep your symptoms from getting worse.    Maintain a healthy weight or lose excess weight. This can help reduce strain on abdominal muscles and tissues.    Stop smoking. This can help prevent coughing that may also strain abdominal muscles and tissues.  Follow-up care  Follow up with your healthcare provider, or as directed. If imaging tests were done, they will be  reviewed a doctor. You will be told the results and any new findings that may affect your care.  When to seek medical advice  Call your healthcare provider right away if any of these occur:    Hernia hardens, swells, or grows larger    Hernia can no longer be pushed back in    Pain moves to the lower right abdomen (just below the waistline), or spreads to the back  Call 911  Call 911 if any of these occur:    Severe pain, redness, or tenderness in the area near the hernia    Pain worsens quickly and doesn t get better    Inability to have a bowel movement or pass gas    Fever of 100.4 F (38 C) or higher, or as directed by your healthcare provider  Date Last Reviewed: 3/1/2018    1558-7324 [employer]. [address] [place] [address], [place] [address]. All rights reserved. This information is not intended as a substitute for professional medical care. Always follow your healthcare professional's instructions.           Patient Education     How a Hernia Develops  Although a hernia bulge may appear suddenly, hernias often take years to develop. They grow larger as pressure inside the body presses the intestines or other tissues out through a weak area in the abdominal wall, often at the belly button or a site of previous surgery. With time, these tissues can bulge out beneath the skin.  Stages of hernia development    The wall weakens or tears. The abdominal lining bulges out through a weak area and begins to form a hernia sac. The sac may contain fat, intestine, or other tissues. At this point, the hernia may or may not cause a visible bulge.        The intestine pushes into the sac. As the intestine pushes further into the sac, it forms a visible bulge. The bulge may flatten when you lie down or push against it. This is called a reducible hernia and does not cause any immediate danger.             The intestine may become trapped. The sac containing the intestine may become trapped by muscle (incarcerated). If  this happens, you won t be able to flatten the bulge. You may also have pain. Prompt treatment is needed.        The intestine may become strangulated. If the intestine is tightly trapped, it becomes strangulated. The strangulated area loses blood supply and may die. This can cause severe pain and block the intestine. Emergency surgery is needed.   Date Last Reviewed: 8/1/2016 2000-2018 [employer]. [address] [place] [address], [place] [address]. All rights reserved. This information is not intended as a substitute for professional medical care. Always follow your healthcare professional's instructions.           Patient Education     Prostate-Specific Antigen (PSA)  Does this test have other names?  PSA  What is this test?  This test measures the level of prostate-specific antigen (PSA) in your blood.  The cells of the prostate gland make the protein called PSA. Men normally have low levels of PSA. If your PSA levels start to rise, it could mean you have one of the below:    Prostate cancer    A benign prostate condition    Inflammation of the prostate    An infection in the prostate  Experts don t all agree on when to have PSA testing. The U.S. Preventative Services Task Force advises men who don't have any symptoms of prostate cancer to not have a PSA test. The task force says that PSA test results can lead to treating small cancers that would never be life-threatening.  The American Cancer Society advises that men be told the risks and benefits of PSA testing and make their own decision about if and when to be screened.  The American Urologic Society says that PSA screening is most important between the ages of 55 and 69. If you have a brother or father with prostate cancer or you are , you should start testing at age 40.   Why do I need this test?  You may need this test if you are age 50 or older and your healthcare provider wants to screen you for prostate cancer. Some providers  advise screening at age 40 or 45 if you have a family history of prostate cancer or other risk factors.  You may also have this test if you have already been diagnosed with prostate cancer. This is so your healthcare provider can check your treatment and see if your cancer has come back.  What other tests might I have along with this test?  Your healthcare provider may also do a digital rectal exam (ESTIVEN). A ESTIVEN is a physical exam of the prostate, not a lab test. For the exam, your provider will place a gloved finger in your rectum and feel the prostate to check for any bumps or abnormal areas. The FDA recommends that a ESTIVEN be done along with a PSA test.  What do my test results mean?  Test results may vary depending on your age, gender, health history, the method used for the test, and other things. Your test results may not mean you have a problem. Ask your healthcare provider what your test results mean for you.  Results are given in nanograms per milliliter, ng/mL.   Results below 4.0 ng/mL are seen as normal. But some healthcare providers may use these age-based results brackets to define normal for you:     Ages 40 to 49: 0-2.5 ng/mL    Ages 50 to 59: 0-3.5 ng/mL    Ages 60 to 69: 0-4.5 ng/mL    Ages 70 to 79: 0-6.5 ng/mL  A rising PSA may mean that you have cancer. But the PSA results alone won't tell your healthcare provider if it's cancer or a benign prostate condition. If your healthcare provider thinks you have cancer, you may need a biopsy of the prostate to confirm the diagnosis.  How is this test done?  The test is done with a blood sample. A needle is used to draw blood from a vein in your arm or hand.  Does this test pose any risks?  Having a blood test with a needle carries some risks. These include bleeding, infection, bruising, and feeling lightheaded. When the needle pricks your arm or hand, you may feel a slight sting or pain. Afterward, the site may be sore.  What might affect my test  results?  Test results can be affected by:    An infection    Some medicines    Riding a bicycle before the test    Ejaculation before the test  How do I get ready for this test?  You may need to abstain from sex and not ride a bicycle within 1 to 2 days of the test. In addition, be sure your healthcare provider knows about all medicines, herbs, vitamins, and supplements you are taking. This includes medicines that don't need a prescription and any illegal drugs you may use.    0970-1235 [employer]. [address] [place], [place] [id number]. All rights reserved. This information is not intended as a substitute for professional medical care. Always follow your healthcare professional's instructions.           Patient Education     Prostate Biopsy    Cancer occurs when abnormal cells form a tumor. A tumor is a lump of cells that grow uncontrolled. Initial tests that may indicate cancer of the prostate include a digital rectal exam, a PSA (prostate specific antigen blood test), ultrasound, and others. A core needle biopsy will be done if your healthcare provider thinks you have prostate cancer. A thin needle is used to remove small samples of prostate tissue. Usually multiple biopsies are taken. These samples are checked for cancer.  Taking tissue samples  A biopsy takes about 15 to 20 minutes. You may be given an enema or suppository before the biopsy to clear the bowels. Antibiotics are given at least an hour before the biopsy. During the procedure:    You will be given antibiotics to prevent infection.    You may be given a sedative, local pain killer, or pain medicine.    A small, ultrasound  probe is put into the rectum as you lie on your side. A picture of your prostate can then be seen on a monitor. This is called a transrectal ultrasound (TRUS).    Your healthcare provider will use the TRUS picture as a guide. He or she will use a thin needle to remove tiny tissue samples from some sites in the  prostate.    These tissue samples are sent to the pathology department. They are looked at under a microscope so a diagnosis can be made.   Risks and complications of core needle biopsy    Infection    Blood in urine, stool, or semen    Pain    The biopsy misses the tumor   Home care  You may have had the biopsy done through your rectum, your urethra, or through the skin between your scrotum and rectum. Your healthcare provider will tell you what to do after the biopsy. These instructions are based on your health condition, the type of biopsy, and your provider s practices.    Your provider may give you pain medicine such as acetaminophen or ibuprofen for discomfort or pain. Follow your provider s instructions for taking these medicines. Don t take aspirin or ibuprofen after the procedure. If you have a chronic liver or kidney disease, talk with your provider before taking these medicines. Also talk with your provider if you ve had a stomach ulcer or gastrointestinal bleeding. Let your provider know all the medicines you currently take.    You may need to take antibiotics for 1 to 2 days. This will help prevent an infection. Signs of an infection include chills, pain, or fever.    You may be told to drink 8 ounces of water every 30 minutes for 2 hours. This will help ease any discomfort. You can also take a warm bath or put a warm, damp washcloth over your urethra to help ease the pain.    You may see minor bleeding after the procedure. This is normal and usually needs no treatment. You may see blood in your urine or semen (rust color). You may also have light bleeding from your rectum if you have hemorrhoids.    Your provider will tell you when you can go back to your normal activities. This includes sex, exercise, and straining physically. Discuss these with your provider.  When to seek medical advice  Call your healthcare provider right away if any of these occur:    You aren t able to urinate, or see that you  have less flow of urine    Chills and fever of 100.4 F (38 C)      Severe pain    Signs of an infection that is getting worse. These include worsening pain, pain in your side under the rib cage or in the low back, or foul-smelling urine.    Blood clots or bright red blood in your stool or urine    You feel confused or very tired  Date Last Reviewed: 1/1/2017 2000-2018 [employer]. [address] [place] [address], [place] [address]. All rights reserved. This information is not intended as a substitute for professional medical care. Always follow your healthcare professional's instructions.           Patient Education     Prostate Ultrasound    An ultrasound is a type of imaging test. It can be used to look at the prostate. It s a safe procedure that does not use radiation. It uses high-frequency sound waves.  When ultrasound is used  You may need ultrasound on your prostate if your healthcare provider thinks you may have prostate cancer. An ultrasound is most often done after a digital rectal exam (ESTIVEN) or a PSA blood test. These are screening tests for prostate cancer.   How ultrasound helps  Ultrasound uses sound waves to create an image of the prostate gland. It helps your healthcare provider see if the gland looks abnormal. It can also be used to help guide a biopsy. This is a procedure that removes tiny pieces of tissue to test. A prostate biopsy is done with a needle. Ultrasound helps your healthcare provider see where to put the needle.   If you have prostate cancer, you may choose to treat it with radioactive  seeds.  Ultrasound can also be used to help your healthcare provider see while placing these seeds into the prostate.  Before the procedure  You may be told to use an enema or suppository the night or morning before. This is to clear your rectum of stool. The test is often done in your healthcare provider s office. It usually takes less than 15 minutes.   If a biopsy may be done, you might be  asked to stop taking blood-thinning medicines like aspirin for a week or so before the test. This helps lower the risk of bleeding. You might also be given antibiotics before and after the test.  During the procedure  What happens during the procedure:    You lie on your side with your knees bent or with your feet in stirrups.     A disposable cover is put on the ultrasound probe. The probe is tube-shaped and a bit larger than a thumb. A jelly is put on the probe to lubricate it.    Your healthcare provider gently inserts the probe into your rectum. This may cause some discomfort.    The probe emits sound waves. These create an image of your prostate on a computer screen. Your healthcare provider looks at the image. The size and shape of your prostate are checked for problems.    When the test is done, the probe is removed.    You can go home the same day, and go back to your normal activities.  Date Last Reviewed: 5/1/2017 2000-2018 [employer]. [address] [place] [address], [place] [address]. All rights reserved. This information is not intended as a substitute for professional medical care. Always follow your healthcare professional's instructions.

## 2019-01-30 NOTE — Clinical Note
Tyler Colbert- this guys seeing you tomorrow. Elevated PSA going back to 2017-- somehow did not see Urology in 2017- PSA rising - very small prostate to my fingers.  Thanks

## 2019-01-30 NOTE — LETTER
January 30, 2019      Rajendra Gagnon  [id number] [facility] [address]  [place] [address]        To Whom It May Concern:    Rajendra Gagnon  was seen on 1/29/19.  Please excuse him  until 1/29/19 due to appointment.        Sincerely,        Bhupinder Mckeon MD

## 2019-01-30 NOTE — PROGRESS NOTES
SUBJECTIVE:   CC: Rajendra Gagnon is an 53 year old male who presents for preventive health visit.     Healthy Habits:    Do you get at least three servings of calcium containing foods daily (dairy, green leafy vegetables, etc.)? yes    Amount of exercise or daily activities, outside of work: 4-5 day(s) per week    Problems taking medications regularly No    Medication side effects: No    Have you had an eye exam in the past two years? yes    Do you see a dentist twice per year? no    Do you have sleep apnea, excessive snoring or daytime drowsiness?no      Hyperlipidemia Follow-Up      Rate your low fat/cholesterol diet?: poor    Taking statin?  No    Other lipid medications/supplements?:  Fish oil/Omega 3, dose 1200mg without side effects    Depression and Anxiety Follow-Up    Status since last visit: Improved     Other associated symptoms:None    Complicating factors:     Significant life event: No     Current substance abuse: None    PHQ 11/14/2017 12/19/2017 1/30/2019   PHQ-9 Total Score 7 6 4   Q9: Suicide Ideation Not at all Not at all Not at all     CLAUDETTE-7 SCORE 11/14/2017 12/19/2017 1/30/2019   Total Score 5 5 3       PHQ-9  English  PHQ-9   Any Language  CLAUDETTE-7  Suicide Assessment Five-step Evaluation and Treatment (SAFE-T)    Today's PHQ-2 Score:   PHQ-2 ( 1999 [employer]) 1/29/2019 9/11/2013   Q1: Little interest or pleasure in doing things 0 0   Q2: Feeling down, depressed or hopeless 0 1   PHQ-2 Score 0 1       Abuse: Current or Past(Physical, Sexual or Emotional)- No  Do you feel safe in your environment? Yes    Social History     Tobacco Use     Smoking status: Never Smoker     Smokeless tobacco: Never Used   Substance Use Topics     Alcohol use: Yes     Alcohol/week: 2.0 oz     Types: 4 Cans of beer per week     Comment: rarely     If you drink alcohol do you typically have >3 drinks per day or >7 drinks per week? No                      Last PSA:   PSA   Date Value Ref Range Status   05/12/2017 5.08 (H) 0  - 4 ug/L Final     Comment:     Assay Method:  Chemiluminescence using Siemens Vista analyzer       Reviewed orders with patient. Reviewed health maintenance and updated orders accordingly - Yes  Labs reviewed in EPIC    Reviewed and updated as needed this visit by clinical staff  Tobacco  Allergies  Meds  Med Hx  Surg Hx  Fam Hx  Soc Hx        Reviewed and updated as needed this visit by Provider        Past Medical History:   Diagnosis Date     Dysthymia 3/26/2012     Hearing loss in right ear 3/14/2012     History of atrial fibrillation 4/23/2012     Hypertriglyceridemia      Right acoustic neuroma 3/14/2012     Tinnitus, unspecified 3/14/2012     Umbilical hernia without obstruction and without gangrene 5/11/2016     Varicose veins      Varicose veins of both lower extremities 5/12/2017      Past Surgical History:   Procedure Laterality Date     acustic neuroma removed  3/2007    right     COLONOSCOPY N/A 1/3/2018    Repeat in 2028//Procedure: COLONOSCOPY;  COLONOSCOPY;  Surgeon: Reggie Ron DO;  Location: [facility]     funnel chest[      states had chest reconstruction     HERNIA REPAIR       ORTHOPEDIC SURGERY      right knee surgery     PE TUBES         ROS:  CONSTITUTIONAL: NEGATIVE for fever, chills, change in weight  INTEGUMENTARY/SKIN: NEGATIVE for worrisome rashes, moles or lesions  EYES: NEGATIVE for vision changes or irritation  ENT: NEGATIVE for ear, mouth and throat problems  RESP: NEGATIVE for significant cough or SOB  CV: NEGATIVE for chest pain, palpitations or peripheral edema  GI: NEGATIVE for nausea, abdominal pain, heartburn, or change in bowel habits   male: negative for dysuria, hematuria, decreased urinary stream, erectile dysfunction, urethral discharge  MUSCULOSKELETAL: NEGATIVE for significant arthralgias or myalgia  NEURO: NEGATIVE for weakness, dizziness or paresthesias  PSYCHIATRIC: NEGATIVE for changes in mood or affect    OBJECTIVE:   /68   Pulse 83   Temp 97.1  " F (36.2  C)   Ht 1.88 m (6' 2\")   Wt 117.9 kg (260 lb)   SpO2 96%   BMI 33.38 kg/m    EXAM:  GENERAL: healthy, alert and no distress  EYES: Eyes grossly normal to inspection, PERRL and conjunctivae and sclerae normal  HENT: ear canals and TM's normal, nose and mouth without ulcers or lesions  NECK: no adenopathy, no asymmetry, masses, or scars and thyroid normal to palpation  RESP: lungs clear to auscultation - no rales, rhonchi or wheezes  CV: regular rate and rhythm, normal S1 S2, no S3 or S4, no murmur, click or rub, no peripheral edema and peripheral pulses strong  ABDOMEN: soft, nontender, no hepatosplenomegaly, no masses and bowel sounds normal   (male): normal male genitalia without lesions or urethral discharge, no hernia  RECTAL: normal sphincter tone, no rectal masses, prostate normal size, smooth, nontender without nodules or masses  MS: no gross musculoskeletal defects noted, no edema  SKIN: no suspicious lesions or rashes  NEURO: Normal strength and tone, mentation intact and speech normal  PSYCH: mentation appears normal, affect normal/bright    Results for orders placed or performed in visit on 01/30/19   Prostate spec antigen screen   Result Value Ref Range    PSA 6.39 (H) 0 - 4 ug/L   Lipid Profile   Result Value Ref Range    Cholesterol 178 <200 mg/dL    Triglycerides 156 (H) <150 mg/dL    HDL Cholesterol 33 (L) >39 mg/dL    LDL Cholesterol Calculated 114 (H) <100 mg/dL    Non HDL Cholesterol 145 (H) <130 mg/dL   Comprehensive metabolic panel   Result Value Ref Range    Sodium 139 133 - 144 mmol/L    Potassium 4.0 3.4 - 5.3 mmol/L    Chloride 107 94 - 109 mmol/L    Carbon Dioxide 28 20 - 32 mmol/L    Anion Gap 4 3 - 14 mmol/L    Glucose 93 70 - 99 mg/dL    Urea Nitrogen 17 7 - 30 mg/dL    Creatinine 1.04 0.66 - 1.25 mg/dL    GFR Estimate 82 >60 mL/min/[1.73_m2]    GFR Estimate If Black >90 >60 mL/min/[1.73_m2]    Calcium 8.3 (L) 8.5 - 10.1 mg/dL    Bilirubin Total 0.9 0.2 - 1.3 mg/dL    " "Albumin 4.1 3.4 - 5.0 g/dL    Protein Total 7.1 6.8 - 8.8 g/dL    Alkaline Phosphatase 70 40 - 150 U/L    ALT 37 0 - 70 U/L    AST 20 0 - 45 U/L         ASSESSMENT/PLAN:   1. Annual physical exam  See below - overall doing well    2. Hypertriglyceridemia   mild - discussed. No meds. Watch diet   - Comprehensive metabolic panel; Future  - Lipid Profile; Future  - Lipid Profile  - Comprehensive metabolic panel    3. Umbilical hernia without obstruction and without gangrene  Discussed. Needs to get fixed- getting better.  See below. Elevated PSA - need to deal with this first. IF has Ca-- may need prostatectomy   - GENERAL SURG ADULT REFERRAL    4. Adjustment disorder with mixed anxiety and depressed mood  Stable - doing well    5. Screening for prostate cancer  See below   - Prostate spec antigen screen; Future  - Prostate spec antigen screen    6. Elevated prostate specific antigen (PSA)  Smaller prostate on exam but increase PSA. In 2017 - referred to Urology BUT looks like did not see. Pt is not aware why did not see.  Discussed - will make sure sees this year. Discussed many possibility of reasons for elevated psa can reveal.   - UROLOGY ADULT REFERRAL    COUNSELING:  Reviewed preventive health counseling, as reflected in patient instructions       Regular exercise       Healthy diet/nutrition       Prostate cancer screening    BP Readings from Last 1 Encounters:   01/30/19 118/68     Estimated body mass index is 33.38 kg/m  as calculated from the following:    Height as of this encounter: 1.88 m (6' 2\").    Weight as of this encounter: 117.9 kg (260 lb).      Weight management plan: Discussed healthy diet and exercise guidelines     reports that  has never smoked. he has never used smokeless tobacco.      Counseling Resources:  ATP IV Guidelines  Pooled Cohorts Equation Calculator  FRAX Risk Assessment  ICSI Preventive Guidelines  Dietary Guidelines for Americans, 2010  USDA's MyPlate  ASA Prophylaxis  Lung CA " Screening    Bhupinder Mckeon MD  [facility]

## 2019-01-30 NOTE — NURSING NOTE
"Chief Complaint   Patient presents with     Physical       Initial /68   Pulse 83   Temp 97.1  F (36.2  C)   Ht 1.88 m (6' 2\")   Wt 117.9 kg (260 lb)   SpO2 96%   BMI 33.38 kg/m   Estimated body mass index is 33.38 kg/m  as calculated from the following:    Height as of this encounter: 1.88 m (6' 2\").    Weight as of this encounter: 117.9 kg (260 lb).  Medication Reconciliation: complete    Juan A Vitale LPN  "

## 2019-01-31 ENCOUNTER — OFFICE VISIT (OUTPATIENT)
Dept: UROLOGY | Facility: OTHER | Age: 54
End: 2019-01-31
Attending: UROLOGY
Payer: COMMERCIAL

## 2019-01-31 VITALS
DIASTOLIC BLOOD PRESSURE: 70 MMHG | BODY MASS INDEX: 33.38 KG/M2 | RESPIRATION RATE: 16 BRPM | WEIGHT: 260 LBS | SYSTOLIC BLOOD PRESSURE: 118 MMHG

## 2019-01-31 DIAGNOSIS — R97.20 ELEVATED PSA: Primary | ICD-10-CM

## 2019-01-31 PROCEDURE — 99244 OFF/OP CNSLTJ NEW/EST MOD 40: CPT | Performed by: UROLOGY

## 2019-01-31 RX ORDER — CEFDINIR 300 MG/1
CAPSULE ORAL
Qty: 6 CAPSULE | Refills: 0 | Status: SHIPPED | OUTPATIENT
Start: 2019-01-31 | End: 2020-12-14

## 2019-01-31 ASSESSMENT — PAIN SCALES - GENERAL: PAINLEVEL: MILD PAIN (2)

## 2019-01-31 ASSESSMENT — ANXIETY QUESTIONNAIRES: GAD7 TOTAL SCORE: 3

## 2019-01-31 NOTE — PROGRESS NOTES
I was asked to see this patient by Dr Mckeon and provide my opinion about the following:  Elevated PSA    Type of Visit  Consult    Chief Complaint  Elevated PSA    HPI  Mr. Gagnon is a 53 year old male who presents with an elevated PSA.  He does not have a family history of prostate cancer of father or brother however his grandfather was diagnosed wioth prostate cancer.  The patient has not previously undergone prostate biopsy.  No associated worsening LUTS, dysuria or prostatitis at the time of the PSA.  The most recent PSA was collected 1 day ago.      Past Medical History  He  has a past medical history of Dysthymia (3/26/2012), Hearing loss in right ear (3/14/2012), History of atrial fibrillation (4/23/2012), Hypertriglyceridemia, Right acoustic neuroma (3/14/2012), Tinnitus, unspecified (3/14/2012), Umbilical hernia without obstruction and without gangrene (5/11/2016), Varicose veins, and Varicose veins of both lower extremities (5/12/2017).  Patient Active Problem List   Diagnosis     Benign neoplasm of cranial nerve (H)     Dysthymia     History of atrial fibrillation     Hypertriglyceridemia     ACP (advance care planning)     Umbilical hernia without obstruction and without gangrene     Right acoustic neuroma     Varicose veins of both lower extremities     Adjustment disorder with mixed anxiety and depressed mood       Past Surgical History  He  has a past surgical history that includes acustic neuroma removed (3/2007); orthopedic surgery; hernia repair; funnel chest[; pe tubes; and Colonoscopy (N/A, 1/3/2018).    Medications  He has a current medication list which includes the following prescription(s): acetaminophen, calcium & magnesium carbonates, calcium-magnesium-zinc, diphenhydramine hcl, metronidazole, multiple vitamins-minerals, omega-3 fatty acids, vitamin b complex with vitamin c, cholecalciferol, and vitamin e.    Allergies  Allergies   Allergen Reactions     Aspartame      Venlafaxine Other  (See Comments)     Night terrors     Zoloft [Sertraline] Other (See Comments)     Head rush, hives and agitation       Social History  He  reports that  has never smoked. he has never used smokeless tobacco. He reports that he drinks about 2.0 oz of alcohol per week. He reports that he does not use drugs.  No drug abuse.    Family History  Family History   Problem Relation Age of Onset     Diabetes Mother         type 2     Hypertension Mother      Heart Disease Mother      Lipids Mother      Alcohol/Drug Father      Cancer Maternal Grandmother      Eye Disorder Maternal Grandfather      Heart Disease Maternal Grandfather      Cancer Maternal Grandfather      Cancer Paternal Grandmother      Prostate Cancer Paternal Grandfather      Other - See Comments Daughter         lupus     Kidney Disease Daughter      Kidney Disease Daughter        Review of Systems  I personally reviewed the ROS with the patient.    Nursing Notes:   Michelle Shine LPN  1/31/2019 11:41 AM  Signed  Pt presents to clinic for consult on elevated PSA    Review of Systems:    Weight loss:    No     Recent fever/chills:  No   Night sweats:   No  Current skin rash:  Yes   Recent hair loss:  No  Heat intolerance:  No   Cold intolerance:  No  Chest pain:   No   Palpitations:   No  Shortness of breath:  No   Wheezing:   No  Constipation:    No   Diarrhea:   No   Nausea:   No   Vomiting:   No   Kidney/side pain:  yes   Back pain:   No  Frequent headaches:  No   Dizziness:     Yes  Leg swelling:   Yes   Calf pain:    No    Parents, brothers or sisters with history of kidney cancer:   No  Parents, brothers or sisters with history of bladder cancer: No  Father or brother with history of prostate cancer:  Yes - grandfather      Physical Exam  Vitals:    01/31/19 1132   BP: 118/70   BP Location: Left arm   Patient Position: Sitting   Cuff Size: Adult Large   Resp: 16   Weight: 117.9 kg (260 lb)     Constitutional: No acute distress.  Alert and  cooperative   Head: NCAT  Eyes: Conjunctivae normal  Cardiovascular: Regular rate and rhythm  Pulmonary/Chest: Respirations are even and non-labored bilaterally, no audible wheezing. CTAB  Abdominal: Soft. No distension, tenderness, masses or guarding.   Neurological: A + O x 3.  Cranial Nerves II-XII grossly intact.  Extremities: RIA x 4, Warm. No clubbing.  No cyanosis.    Skin: Pink, warm and dry.  No visible rashes noted.  Psychiatric:  Normal mood and affect  Back:  No left CVA tenderness.  No right CVA tenderness.  Genitourinary:  Nonpalpable bladder    Labs  Results for JUSTUS BILLINGS (MRN [id number]) as of 1/31/2019 11:41   9/11/2013 11:11 5/12/2017 07:54 1/30/2019 07:49   PSA 3.50 5.08 (H) 6.39 (H)     Assessment  Mr. Billings is a 53 year old male who presents with elevated PSA.  Discussed the risks of biopsy leading to overdiagnosis and overtreatment.  I explained to the patient that an elevated PSA is a marker of risk of prostate cancer and a prostate biopsy oftentimes is the next step if diagnosis is the goal.  I explained that sampling error can occur with any biopsy and there is a risk of potentially missing a cancer that may be present.    I discussed the risks, benefits, and alternatives to prostate biopsy, including hematuria, hematochezia, and hematospermia.  I also discussed the risk of diagnosing a clinically-insignificant prostate cancer.  I discussed the risks of sepsis, which can be minimized by prophylactic antibiotics.     Plan  Recommended TRUS biopsy of prostate in OR with MAC sedation   Gentamicin 120mg IM prior to the biopsy.   Omnicef 300mg po bid for 3 days to start the day prior to biopsy.   Patient denies taking antiplatelets or anticoagulant medication.

## 2019-01-31 NOTE — NURSING NOTE
Pt presents to clinic for consult on elevated PSA    Review of Systems:    Weight loss:    No     Recent fever/chills:  No   Night sweats:   No  Current skin rash:  Yes   Recent hair loss:  No  Heat intolerance:  No   Cold intolerance:  No  Chest pain:   No   Palpitations:   No  Shortness of breath:  No   Wheezing:   No  Constipation:    No   Diarrhea:   No   Nausea:   No   Vomiting:   No   Kidney/side pain:  yes   Back pain:   No  Frequent headaches:  No   Dizziness:     Yes  Leg swelling:   Yes   Calf pain:    No    Parents, brothers or sisters with history of kidney cancer:   No  Parents, brothers or sisters with history of bladder cancer: No  Father or brother with history of prostate cancer:  Yes - grandfather

## 2019-01-31 NOTE — H&P (VIEW-ONLY)
I was asked to see this patient by Dr Mckeon and provide my opinion about the following:  Elevated PSA    Type of Visit  Consult    Chief Complaint  Elevated PSA    HPI  Mr. Gagnon is a 53 year old male who presents with an elevated PSA.  He does not have a family history of prostate cancer of father or brother however his grandfather was diagnosed wioth prostate cancer.  The patient has not previously undergone prostate biopsy.  No associated worsening LUTS, dysuria or prostatitis at the time of the PSA.  The most recent PSA was collected 1 day ago.      Past Medical History  He  has a past medical history of Dysthymia (3/26/2012), Hearing loss in right ear (3/14/2012), History of atrial fibrillation (4/23/2012), Hypertriglyceridemia, Right acoustic neuroma (3/14/2012), Tinnitus, unspecified (3/14/2012), Umbilical hernia without obstruction and without gangrene (5/11/2016), Varicose veins, and Varicose veins of both lower extremities (5/12/2017).  Patient Active Problem List   Diagnosis     Benign neoplasm of cranial nerve (H)     Dysthymia     History of atrial fibrillation     Hypertriglyceridemia     ACP (advance care planning)     Umbilical hernia without obstruction and without gangrene     Right acoustic neuroma     Varicose veins of both lower extremities     Adjustment disorder with mixed anxiety and depressed mood       Past Surgical History  He  has a past surgical history that includes acustic neuroma removed (3/2007); orthopedic surgery; hernia repair; funnel chest[; pe tubes; and Colonoscopy (N/A, 1/3/2018).    Medications  He has a current medication list which includes the following prescription(s): acetaminophen, calcium & magnesium carbonates, calcium-magnesium-zinc, diphenhydramine hcl, metronidazole, multiple vitamins-minerals, omega-3 fatty acids, vitamin b complex with vitamin c, cholecalciferol, and vitamin e.    Allergies  Allergies   Allergen Reactions     Aspartame      Venlafaxine Other  (See Comments)     Night terrors     Zoloft [Sertraline] Other (See Comments)     Head rush, hives and agitation       Social History  He  reports that  has never smoked. he has never used smokeless tobacco. He reports that he drinks about 2.0 oz of alcohol per week. He reports that he does not use drugs.  No drug abuse.    Family History  Family History   Problem Relation Age of Onset     Diabetes Mother         type 2     Hypertension Mother      Heart Disease Mother      Lipids Mother      Alcohol/Drug Father      Cancer Maternal Grandmother      Eye Disorder Maternal Grandfather      Heart Disease Maternal Grandfather      Cancer Maternal Grandfather      Cancer Paternal Grandmother      Prostate Cancer Paternal Grandfather      Other - See Comments Daughter         lupus     Kidney Disease Daughter      Kidney Disease Daughter        Review of Systems  I personally reviewed the ROS with the patient.    Nursing Notes:   Michelle Shine LPN  1/31/2019 11:41 AM  Signed  Pt presents to clinic for consult on elevated PSA    Review of Systems:    Weight loss:    No     Recent fever/chills:  No   Night sweats:   No  Current skin rash:  Yes   Recent hair loss:  No  Heat intolerance:  No   Cold intolerance:  No  Chest pain:   No   Palpitations:   No  Shortness of breath:  No   Wheezing:   No  Constipation:    No   Diarrhea:   No   Nausea:   No   Vomiting:   No   Kidney/side pain:  yes   Back pain:   No  Frequent headaches:  No   Dizziness:     Yes  Leg swelling:   Yes   Calf pain:    No    Parents, brothers or sisters with history of kidney cancer:   No  Parents, brothers or sisters with history of bladder cancer: No  Father or brother with history of prostate cancer:  Yes - grandfather      Physical Exam  Vitals:    01/31/19 1132   BP: 118/70   BP Location: Left arm   Patient Position: Sitting   Cuff Size: Adult Large   Resp: 16   Weight: 117.9 kg (260 lb)     Constitutional: No acute distress.  Alert and  cooperative   Head: NCAT  Eyes: Conjunctivae normal  Cardiovascular: Regular rate and rhythm  Pulmonary/Chest: Respirations are even and non-labored bilaterally, no audible wheezing. CTAB  Abdominal: Soft. No distension, tenderness, masses or guarding.   Neurological: A + O x 3.  Cranial Nerves II-XII grossly intact.  Extremities: RIA x 4, Warm. No clubbing.  No cyanosis.    Skin: Pink, warm and dry.  No visible rashes noted.  Psychiatric:  Normal mood and affect  Back:  No left CVA tenderness.  No right CVA tenderness.  Genitourinary:  Nonpalpable bladder    Labs  Results for JUSTUS BILLINGS (MRN [id number]) as of 1/31/2019 11:41   9/11/2013 11:11 5/12/2017 07:54 1/30/2019 07:49   PSA 3.50 5.08 (H) 6.39 (H)     Assessment  Mr. Billings is a 53 year old male who presents with elevated PSA.  Discussed the risks of biopsy leading to overdiagnosis and overtreatment.  I explained to the patient that an elevated PSA is a marker of risk of prostate cancer and a prostate biopsy oftentimes is the next step if diagnosis is the goal.  I explained that sampling error can occur with any biopsy and there is a risk of potentially missing a cancer that may be present.    I discussed the risks, benefits, and alternatives to prostate biopsy, including hematuria, hematochezia, and hematospermia.  I also discussed the risk of diagnosing a clinically-insignificant prostate cancer.  I discussed the risks of sepsis, which can be minimized by prophylactic antibiotics.     Plan  Recommended TRUS biopsy of prostate in OR with MAC sedation   Gentamicin 120mg IM prior to the biopsy.   Omnicef 300mg po bid for 3 days to start the day prior to biopsy.   Patient denies taking antiplatelets or anticoagulant medication.

## 2019-02-04 ENCOUNTER — ANESTHESIA EVENT (OUTPATIENT)
Dept: SURGERY | Facility: OTHER | Age: 54
End: 2019-02-04
Payer: COMMERCIAL

## 2019-02-04 RX ORDER — SODIUM CHLORIDE, SODIUM LACTATE, POTASSIUM CHLORIDE, CALCIUM CHLORIDE 600; 310; 30; 20 MG/100ML; MG/100ML; MG/100ML; MG/100ML
INJECTION, SOLUTION INTRAVENOUS CONTINUOUS
Status: CANCELLED | OUTPATIENT
Start: 2019-02-04

## 2019-02-05 ENCOUNTER — ANESTHESIA (OUTPATIENT)
Dept: SURGERY | Facility: OTHER | Age: 54
End: 2019-02-05
Payer: COMMERCIAL

## 2019-02-05 ENCOUNTER — HOSPITAL ENCOUNTER (OUTPATIENT)
Facility: OTHER | Age: 54
Discharge: HOME | End: 2019-02-05
Attending: UROLOGY | Admitting: UROLOGY
Payer: COMMERCIAL

## 2019-02-05 VITALS
HEIGHT: 74 IN | WEIGHT: 260 LBS | TEMPERATURE: 98.2 F | DIASTOLIC BLOOD PRESSURE: 72 MMHG | OXYGEN SATURATION: 98 % | BODY MASS INDEX: 33.37 KG/M2 | RESPIRATION RATE: 16 BRPM | HEART RATE: 64 BPM | SYSTOLIC BLOOD PRESSURE: 116 MMHG

## 2019-02-05 PROCEDURE — 25000125 ZZHC RX 250: Performed by: NURSE ANESTHETIST, CERTIFIED REGISTERED

## 2019-02-05 PROCEDURE — 76499 UNLISTED DX RADIOGRAPHIC PX: CPT

## 2019-02-05 PROCEDURE — 76872 US TRANSRECTAL: CPT | Mod: 26 | Performed by: UROLOGY

## 2019-02-05 PROCEDURE — 71000027 ZZH RECOVERY PHASE 2 EACH 15 MINS: Performed by: UROLOGY

## 2019-02-05 PROCEDURE — 25000128 H RX IP 250 OP 636: Performed by: UROLOGY

## 2019-02-05 PROCEDURE — 76942 ECHO GUIDE FOR BIOPSY: CPT | Mod: 26 | Performed by: UROLOGY

## 2019-02-05 PROCEDURE — 25000128 H RX IP 250 OP 636: Performed by: NURSE ANESTHETIST, CERTIFIED REGISTERED

## 2019-02-05 PROCEDURE — 55700 ZZHC BIOPSY PROSTATE NEEDLE/PUNCH: CPT | Performed by: UROLOGY

## 2019-02-05 PROCEDURE — 88305 TISSUE EXAM BY PATHOLOGIST: CPT

## 2019-02-05 PROCEDURE — 55700 AS BIOPSY PROSTATE NEEDLE/PUNCH: CPT | Performed by: NURSE ANESTHETIST, CERTIFIED REGISTERED

## 2019-02-05 PROCEDURE — 40000306 ZZH STATISTIC PRE PROC ASSESS II: Performed by: UROLOGY

## 2019-02-05 PROCEDURE — 37000008 ZZH ANESTHESIA TECHNICAL FEE, 1ST 30 MIN: Performed by: UROLOGY

## 2019-02-05 PROCEDURE — 36000050 ZZH SURGERY LEVEL 2 1ST 30 MIN: Performed by: UROLOGY

## 2019-02-05 PROCEDURE — 76872 US TRANSRECTAL: CPT

## 2019-02-05 PROCEDURE — 76942 ECHO GUIDE FOR BIOPSY: CPT | Mod: XU

## 2019-02-05 PROCEDURE — 27210794 ZZH OR GENERAL SUPPLY STERILE: Performed by: UROLOGY

## 2019-02-05 PROCEDURE — 25000125 ZZHC RX 250: Performed by: UROLOGY

## 2019-02-05 RX ORDER — GENTAMICIN SULFATE 60 MG/50ML
120 INJECTION, SOLUTION INTRAVENOUS
Status: COMPLETED | OUTPATIENT
Start: 2019-02-05 | End: 2019-02-05

## 2019-02-05 RX ORDER — LIDOCAINE HYDROCHLORIDE 20 MG/ML
INJECTION, SOLUTION INFILTRATION; PERINEURAL PRN
Status: DISCONTINUED | OUTPATIENT
Start: 2019-02-05 | End: 2019-02-05

## 2019-02-05 RX ORDER — NALOXONE HYDROCHLORIDE 0.4 MG/ML
.1-.4 INJECTION, SOLUTION INTRAMUSCULAR; INTRAVENOUS; SUBCUTANEOUS
Status: DISCONTINUED | OUTPATIENT
Start: 2019-02-05 | End: 2019-02-05 | Stop reason: HOSPADM

## 2019-02-05 RX ORDER — MEPERIDINE HYDROCHLORIDE 50 MG/ML
12.5 INJECTION INTRAMUSCULAR; INTRAVENOUS; SUBCUTANEOUS
Status: DISCONTINUED | OUTPATIENT
Start: 2019-02-05 | End: 2019-02-05 | Stop reason: HOSPADM

## 2019-02-05 RX ORDER — FENTANYL CITRATE 50 UG/ML
25-50 INJECTION, SOLUTION INTRAMUSCULAR; INTRAVENOUS
Status: DISCONTINUED | OUTPATIENT
Start: 2019-02-05 | End: 2019-02-05 | Stop reason: HOSPADM

## 2019-02-05 RX ORDER — HYDROMORPHONE HYDROCHLORIDE 1 MG/ML
.3-.5 INJECTION, SOLUTION INTRAMUSCULAR; INTRAVENOUS; SUBCUTANEOUS EVERY 10 MIN PRN
Status: DISCONTINUED | OUTPATIENT
Start: 2019-02-05 | End: 2019-02-05 | Stop reason: HOSPADM

## 2019-02-05 RX ORDER — LIDOCAINE 40 MG/G
CREAM TOPICAL
Status: DISCONTINUED | OUTPATIENT
Start: 2019-02-05 | End: 2019-02-05 | Stop reason: HOSPADM

## 2019-02-05 RX ORDER — OXYCODONE AND ACETAMINOPHEN 5; 325 MG/1; MG/1
1-2 TABLET ORAL EVERY 4 HOURS PRN
Status: DISCONTINUED | OUTPATIENT
Start: 2019-02-05 | End: 2019-02-05 | Stop reason: HOSPADM

## 2019-02-05 RX ORDER — DIPHENHYDRAMINE HCL 25 MG
25 CAPSULE ORAL
Status: DISCONTINUED | OUTPATIENT
Start: 2019-02-05 | End: 2019-02-05 | Stop reason: HOSPADM

## 2019-02-05 RX ORDER — PROPOFOL 10 MG/ML
INJECTION, EMULSION INTRAVENOUS CONTINUOUS PRN
Status: DISCONTINUED | OUTPATIENT
Start: 2019-02-05 | End: 2019-02-05

## 2019-02-05 RX ORDER — SODIUM CHLORIDE 9 MG/ML
INJECTION, SOLUTION INTRAVENOUS CONTINUOUS
Status: DISCONTINUED | OUTPATIENT
Start: 2019-02-05 | End: 2019-02-05 | Stop reason: HOSPADM

## 2019-02-05 RX ORDER — DIPHENHYDRAMINE HYDROCHLORIDE 50 MG/ML
12.5 INJECTION INTRAMUSCULAR; INTRAVENOUS
Status: DISCONTINUED | OUTPATIENT
Start: 2019-02-05 | End: 2019-02-05 | Stop reason: HOSPADM

## 2019-02-05 RX ORDER — SODIUM CHLORIDE, SODIUM LACTATE, POTASSIUM CHLORIDE, CALCIUM CHLORIDE 600; 310; 30; 20 MG/100ML; MG/100ML; MG/100ML; MG/100ML
INJECTION, SOLUTION INTRAVENOUS CONTINUOUS
Status: DISCONTINUED | OUTPATIENT
Start: 2019-02-05 | End: 2019-02-05 | Stop reason: HOSPADM

## 2019-02-05 RX ORDER — PROPOFOL 10 MG/ML
INJECTION, EMULSION INTRAVENOUS PRN
Status: DISCONTINUED | OUTPATIENT
Start: 2019-02-05 | End: 2019-02-05

## 2019-02-05 RX ADMIN — GENTAMICIN SULFATE 120 MG: 60 INJECTION, SOLUTION INTRAVENOUS at 12:26

## 2019-02-05 RX ADMIN — PROPOFOL 100 MG: 10 INJECTION, EMULSION INTRAVENOUS at 12:30

## 2019-02-05 RX ADMIN — PROPOFOL 120 MCG/KG/MIN: 10 INJECTION, EMULSION INTRAVENOUS at 12:30

## 2019-02-05 RX ADMIN — SODIUM CHLORIDE: 900 INJECTION, SOLUTION INTRAVENOUS at 11:38

## 2019-02-05 RX ADMIN — LIDOCAINE HYDROCHLORIDE 80 MG: 20 INJECTION, SOLUTION INFILTRATION; PERINEURAL at 12:30

## 2019-02-05 ASSESSMENT — MIFFLIN-ST. JEOR: SCORE: 2104.1

## 2019-02-05 ASSESSMENT — ENCOUNTER SYMPTOMS: DYSRHYTHMIAS: 1

## 2019-02-05 ASSESSMENT — LIFESTYLE VARIABLES: TOBACCO_USE: 1

## 2019-02-05 NOTE — ANESTHESIA POSTPROCEDURE EVALUATION
Patient: Rajendra Gagnon    Procedure(s):  TRANSRECTAL Ultrasound BIOPSY of PROSTATE    Diagnosis:elevated psa  Diagnosis Additional Information: No value filed.    Anesthesia Type:  MAC    Note:  Anesthesia Post Evaluation    Patient location during evaluation: Phase 2  Patient participation: Able to fully participate in evaluation  Level of consciousness: awake and alert  Pain management: adequate  Airway patency: patent  Cardiovascular status: acceptable  Respiratory status: acceptable  Hydration status: acceptable  PONV: none     Anesthetic complications: None          Last vitals:  Vitals:    02/05/19 1131   BP: (!) 145/96   Pulse: 83   Resp: 16   Temp: 98.8  F (37.1  C)   SpO2: 97%         Electronically Signed By: QAMAR RIDDLE CRNA  February 5, 2019  12:47 PM

## 2019-02-05 NOTE — DISCHARGE INSTRUCTIONS
[facility]   Adult Discharge Orders & Instructions     For 24 hours after surgery    1. Get plenty of rest.  A responsible adult must stay with you for at least 24 hours after you leave the hospital.   2. Do not drive or use heavy equipment.  If you have weakness or tingling, don't drive or use heavy equipment until this feeling goes away.  3. Do not drink alcohol.  4. Avoid strenuous or risky activities.  Ask for help when climbing stairs.   5. You may feel lightheaded.  IF so, sit for a few minutes before standing.  Have someone help you get up.   6. If you have nausea (feel sick to your stomach): Drink only clear liquids such as apple juice, ginger ale, broth or 7-Up.  Rest may also help.  Be sure to drink enough fluids.  Move to a regular diet as you feel able.  7. You may have a slight fever. Call the doctor if your fever is over 101 F (38.3 C) (taken under the tongue) or lasts longer than 24 hours.  8. You may have a dry mouth, a sore throat, muscle aches or trouble sleeping.  These should go away after 24 hours.  9. Do not make important or legal decisions.   Call your doctor for any of the followin.  Signs of infection (fever, growing tenderness at the surgery site, a large amount of drainage or bleeding, severe pain, foul-smelling drainage, redness, swelling).    2. It has been over 8 to 10 hours since surgery and you are still not able to urinate (pass water).    3.  Headache for over 24 hours.    4.  Numbness, tingling or weakness the day after surgery (if you had spinal anesthesia).  To contact a doctor, call    [phone]___________________________

## 2019-02-05 NOTE — ANESTHESIA CARE TRANSFER NOTE
Patient: Rajendra Gagnon    Procedure(s):  TRANSRECTAL Ultrasound BIOPSY of PROSTATE    Diagnosis: elevated psa  Diagnosis Additional Information: No value filed.    Anesthesia Type:   MAC     Note:  Airway :Room Air  Patient transferred to:Phase II  Handoff Report: Identifed the Patient, Identified the Reponsible Provider, Reviewed the pertinent medical history, Discussed the surgical course, Reviewed Intra-OP anesthesia mangement and issues during anesthesia, Set expectations for post-procedure period and Allowed opportunity for questions and acknowledgement of understanding      Vitals: (Last set prior to Anesthesia Care Transfer)    CRNA VITALS  2/5/2019 1214 - 2/5/2019 1246      2/5/2019             Resp Rate (set):  10                Electronically Signed By: QAMAR RIDDLE CRNA  February 5, 2019  12:46 PM

## 2019-02-05 NOTE — OP NOTE
"Preoperative diagnosis  Elevated PSA    Postoperative diagnosis  Elevated PSA    Procedure  Prostate biopsy  Transrectal ultrasound of the prostate  Transrectal ultrasound guidance of needle biopsy    Surgeon  Filemon Betts MD    Surgeon(s)/Proceduralist(s) and Assistants (if any)  Surgeon(s):  Filemon Betts MD  Circulator: Denise Britt RN; Regan Tellez RN  Scrub Person: Catalina Block  2nd Scrub: Lyndsey Souza  Pre-Op Nurse: Kathrine Winkler RN    (EBL) Estimated blood loss (ml)  5    Anesthesia  MAC  2% lidocaine solution, periprostatic injection, 10mL    Complications  None    Specimen  Prostate biopsy x 12 cores    Indications  Mr. Gagnon is a 53 year old year old male with an elevated PSA.  After discussing his options, the patient decided to proceed with prostate biopsy.  Informed consent was obtained. Possible complications were discussed with the patient during his last visit including, but not limited to, hematuria, hematochezia, prostatitis, urinary tract infection, sepsis, and urinary retention.    No components found for: PSASCREEN, PSANUM, PSADIAGNOSTI, PSATOTALDIAG      Exam  Prostate:   30 grams, symmetric, no nodules or induration      Procedure  The patient was positioned and prepped in a left lateral position with lower extremities flexed.  Lidocaine jelly, 2%, was injected per rectum and gentamicin 120mg was injected intramuscularly. ESTIVEN was performed.  The rectal ultrasound probe was slowly introduced into the rectum.  A 22 gauge, 8\" needle was used to perform a periprostatic injection of lidocaine 1%, 10mL through the ultrasound probe.  The prostate and seminal vesicles were inspected systematically using cross and sagittal views with the ultrasound.  There were not  hypoechoic areas within the prostate.  The dimensions of the prostate were measured to be 45mm X 32mm X 36mm, for a calculated volume of 27g.  Using a true cut 14 Fr biopsy needle, 12 prostate cores were collected. " The specific locations on the left were the following: lateral base, lateral mid, lateral apex, medial base, medial mid, and medial apex.  The right side was sampled in a similar manner.  The ultrasound probe was removed.  The patient tolerated the procedure well.    Plan  Complete course of antibiotics and follow up in about 1 week for path report.

## 2019-02-05 NOTE — OR NURSING
Patient has been discharged to home at 1350 via ambulatory accompanied by wife    Written discharge instructions were provided to patient.  Prescriptions were N/A.      Patient and adult caring for them verbalize understanding of discharge instructions including no driving until tomorrow and no longer taking narcotic pain medications - no operating mechanical equipment and no making any important decisions.They understand reason for discharge, and necessary follow-up appointments.      Gudelia Mendez RN

## 2019-02-05 NOTE — ANESTHESIA PREPROCEDURE EVALUATION
Anesthesia Evaluation     . Pt has had prior anesthetic.     History of anesthetic complications    slow to wake      ROS/MED HX    ENT/Pulmonary:  - neg pulmonary ROS   (+)ROSMERY risk factors tobacco use, Past use , . .    Neurologic:     (+)other neuro (Pt reports dizziness on and off now) h/o Right acoustic neuroma s/p resection 2006    Cardiovascular:     (+) Dyslipidemia, ----. : . . VALENCIA, . :. dysrhythmias a-fib, Irregular Heartbeat/Palpitations, . Previous cardiac testing date:results:date: results:ECG reviewed date:12/19/2017 results:appears normal, NSR, Normal Sinus Rhythm, normal axis, normal intervals, no acute ST/T changes c/w ischemia, no LVH by voltage criteria, Right Bundle Branch Block, unchanged from previous tracings date: results:         (-) taking anticoagulants/antiplatelets   METS/Exercise Tolerance:     Hematologic:  - neg hematologic  ROS       Musculoskeletal:   (+) arthritis, , , other musculoskeletal- h/o Pectus Excavatum s/p Reconstruction       GI/Hepatic:     (+) GERD Asymptomatic on medication, bowel prep, Other GI/Hepatic Umbilical hernia      Renal/Genitourinary:  - ROS Renal section negative       Endo:     (+) Obesity, .      Psychiatric:     (+) psychiatric history anxiety and depression      Infectious Disease:  - neg infectious disease ROS       Malignancy:   (+) Malignancy History of Neuro  Neuro CA status post Surgery.          Other:    - neg other ROS                 Physical Exam  Normal systems: cardiovascular, pulmonary and dental    Airway   Mallampati: II  TM distance: >3 FB  Neck ROM: full    Dental     Cardiovascular   Rhythm and rate: regular and normal      Pulmonary     Other findings: Pt reports he has to remind himself to breathe, denies SOB or Chest pain                    Anesthesia Plan      History & Physical Review  History and physical reviewed and following examination; no interval change.    ASA Status:  3 .    NPO Status:  > 6 hours    Plan for MAC with  Intravenous induction. Maintenance will be TIVA.        Risks and benefits of MAC anesthetic discussed including dental damage, aspiration, loss of airway, conversion to general anesthetic, CV complications, MI, stroke, death. Pt wishes to proceed.       Postoperative Care      Consents  Anesthetic plan, risks, benefits and alternatives discussed with:  Patient..                          .

## 2019-02-11 ENCOUNTER — TELEPHONE (OUTPATIENT)
Dept: UROLOGY | Facility: OTHER | Age: 54
End: 2019-02-11

## 2019-02-11 NOTE — TELEPHONE ENCOUNTER
Pt states that he had roof raked, on Thursday and kept  Busy on Friday and had pain in back on Saturday took Ibuprofen, and had blood in the urine,  Stopped taking ibuprofen,  As of today no blood in the urine.  No fever.  Will let us know on Thursday when he comes in for apt  Michelle Shine LPN.......2/11/2019 12:16 PM

## 2019-02-14 ENCOUNTER — OFFICE VISIT (OUTPATIENT)
Dept: UROLOGY | Facility: OTHER | Age: 54
End: 2019-02-14
Attending: UROLOGY
Payer: COMMERCIAL

## 2019-02-14 VITALS
RESPIRATION RATE: 14 BRPM | WEIGHT: 281.8 LBS | BODY MASS INDEX: 36.18 KG/M2 | DIASTOLIC BLOOD PRESSURE: 76 MMHG | HEART RATE: 68 BPM | SYSTOLIC BLOOD PRESSURE: 120 MMHG

## 2019-02-14 DIAGNOSIS — C61 PROSTATE CANCER (H): Primary | ICD-10-CM

## 2019-02-14 PROCEDURE — 99215 OFFICE O/P EST HI 40 MIN: CPT | Performed by: UROLOGY

## 2019-02-14 NOTE — PROGRESS NOTES
Type of Visit  Established    Chief Complaint  Prostate cancer    HPI  Mr. Gagnon is a 53 year old male who is status post TRUS guided biopsy of the prostate.    He is doing well and denies current rectal bleeding or gross hematuria.   He denies fevers or chills.   Patient follows up today for pathology review.      Review of Systems  I reviewed the ROS with the patient.    Nursing Notes:   Michelle Shine LPN  2/14/2019  2:43 PM  Signed  Pt presents to clinic for path results    Review of Systems:    Weight loss:    No     Recent fever/chills:  No   Night sweats:   No  Current skin rash:  No   Recent hair loss:  No  Heat intolerance:  No   Cold intolerance:  No  Chest pain:   No   Palpitations:   No  Shortness of breath:  No   Wheezing:   No  Constipation:    No   Diarrhea:   No   Nausea:   No   Vomiting:   No   Kidney/side pain:  No   Back pain:   No  Frequent headaches:  No   Dizziness:     No  Leg swelling:   Yes   Calf pain:    No          Family History  Family History   Problem Relation Age of Onset     Diabetes Mother         type 2     Hypertension Mother      Heart Disease Mother      Lipids Mother      Alcohol/Drug Father      Cancer Maternal Grandmother      Eye Disorder Maternal Grandfather      Heart Disease Maternal Grandfather      Cancer Maternal Grandfather      Cancer Paternal Grandmother      Prostate Cancer Paternal Grandfather      Other - See Comments Daughter         lupus     Kidney Disease Daughter      Kidney Disease Daughter        Physical Exam  /76 (BP Location: Right arm, Patient Position: Sitting, Cuff Size: Adult Large)   Pulse 68   Resp 14   Wt 127.8 kg (281 lb 12.8 oz)   BMI 36.18 kg/m    Constitutional: NAD, WDWN.  Cardiovascular: Regular rate.  Pulmonary/Chest: Respirations are even and non-labored bilaterally.  Abdominal: Soft. No distension, tenderness, masses or guarding. No CVA tenderness.  Extremities: RIA x 4, Warm. No clubbing.  No cyanosis.    Skin: Pink,  warm and dry.  No rashes noted.  Prostate:          30 grams, symmetric, no nodules or induration    Labs  Results for JUSTUS BILLINGS (MRN [id number]) as of 1/31/2019 11:41   9/11/2013 11:11 5/12/2017 07:54 1/30/2019 07:49   PSA 3.50 5.08 (H) 6.39 (H)       Results for orders placed or performed in visit on 01/30/19   Prostate spec antigen screen   Result Value Ref Range    PSA 6.39 (H) 0 - 4 ug/L   Lipid Profile   Result Value Ref Range    Cholesterol 178 <200 mg/dL    Triglycerides 156 (H) <150 mg/dL    HDL Cholesterol 33 (L) >39 mg/dL    LDL Cholesterol Calculated 114 (H) <100 mg/dL    Non HDL Cholesterol 145 (H) <130 mg/dL   Comprehensive metabolic panel   Result Value Ref Range    Sodium 139 133 - 144 mmol/L    Potassium 4.0 3.4 - 5.3 mmol/L    Chloride 107 94 - 109 mmol/L    Carbon Dioxide 28 20 - 32 mmol/L    Anion Gap 4 3 - 14 mmol/L    Glucose 93 70 - 99 mg/dL    Urea Nitrogen 17 7 - 30 mg/dL    Creatinine 1.04 0.66 - 1.25 mg/dL    GFR Estimate 82 >60 mL/min/[1.73_m2]    GFR Estimate If Black >90 >60 mL/min/[1.73_m2]    Calcium 8.3 (L) 8.5 - 10.1 mg/dL    Bilirubin Total 0.9 0.2 - 1.3 mg/dL    Albumin 4.1 3.4 - 5.0 g/dL    Protein Total 7.1 6.8 - 8.8 g/dL    Alkaline Phosphatase 70 40 - 150 U/L    ALT 37 0 - 70 U/L    AST 20 0 - 45 U/L       Pathology  I personally reviewed the pathology report  2/6/2019  Total cores positive  +2/12 cores  Highest [place] grade: 2/12 cores, 3+4, 40%  Negative cores  10/12 cores    PSA:       6.4  Calculated prostate volume based on TRUS: 27 grams  PSA Density:      0.24 ng/mL/g  T stage:      cT1c    Assessment  Mr. Billings is a 53 year old male who is status post prostate biopsy.    The results of the biopsy revealed low volume intermediate risk prostate cancer.    I discussed the following:   - options for his disease including active surveillance, radiation and surgery   - expected treatment course and side-effect profiles of the various treatments   - open and  robotic-assisted radical prostatectomy   - brachytherapy and external-beam radiotherapy  His questions were answered to his satisfaction.    Plan  Staging studies:  CT a/p prior to considering likely RARP.        I spent 42 minutes on this patient's visit and over 50% of this time was spent in face-to-face discussion and counseling regarding the diagnosis of prostate cancer, treatment options, risks and benefits of each treatment option.

## 2019-02-14 NOTE — NURSING NOTE
Pt presents to clinic for path results    Review of Systems:    Weight loss:    No     Recent fever/chills:  No   Night sweats:   No  Current skin rash:  No   Recent hair loss:  No  Heat intolerance:  No   Cold intolerance:  No  Chest pain:   No   Palpitations:   No  Shortness of breath:  No   Wheezing:   No  Constipation:    No   Diarrhea:   No   Nausea:   No   Vomiting:   No   Kidney/side pain:  No   Back pain:   No  Frequent headaches:  No   Dizziness:     No  Leg swelling:   Yes   Calf pain:    No

## 2019-02-28 ENCOUNTER — OFFICE VISIT (OUTPATIENT)
Dept: UROLOGY | Facility: OTHER | Age: 54
End: 2019-02-28
Attending: UROLOGY
Payer: COMMERCIAL

## 2019-02-28 ENCOUNTER — HOSPITAL ENCOUNTER (OUTPATIENT)
Dept: CT IMAGING | Facility: OTHER | Age: 54
Discharge: HOME | End: 2019-02-28
Attending: UROLOGY | Admitting: UROLOGY
Payer: COMMERCIAL

## 2019-02-28 VITALS
HEART RATE: 68 BPM | DIASTOLIC BLOOD PRESSURE: 84 MMHG | RESPIRATION RATE: 16 BRPM | BODY MASS INDEX: 35.9 KG/M2 | WEIGHT: 279.6 LBS | SYSTOLIC BLOOD PRESSURE: 130 MMHG

## 2019-02-28 DIAGNOSIS — C61 PROSTATE CANCER (H): Primary | ICD-10-CM

## 2019-02-28 DIAGNOSIS — C61 PROSTATE CANCER (H): ICD-10-CM

## 2019-02-28 PROCEDURE — 25500064 ZZH RX 255 OP 636: Performed by: UROLOGY

## 2019-02-28 PROCEDURE — 99214 OFFICE O/P EST MOD 30 MIN: CPT | Performed by: UROLOGY

## 2019-02-28 PROCEDURE — 74177 CT ABD & PELVIS W/CONTRAST: CPT

## 2019-02-28 RX ADMIN — IOHEXOL 100 ML: 350 INJECTION, SOLUTION INTRAVENOUS at 10:41

## 2019-02-28 ASSESSMENT — PAIN SCALES - GENERAL: PAINLEVEL: NO PAIN (1)

## 2019-02-28 NOTE — NURSING NOTE
Pt presents to clinic for CT results    Review of Systems:    Weight loss:    No     Recent fever/chills:  No   Night sweats:   No  Current skin rash:  No   Recent hair loss:  No  Heat intolerance:  No   Cold intolerance:  No  Chest pain:   No   Palpitations:   No  Shortness of breath:  No   Wheezing:   No  Constipation:    No   Diarrhea:   No   Nausea:   No   Vomiting:   No   Kidney/side pain:  No   Back pain:   Yes  Frequent headaches:  No   Dizziness:     No  Leg swelling:   Yes   Calf pain:    No

## 2019-02-28 NOTE — PROGRESS NOTES
1.  Has the patient had a previous reaction to IV contrast? NO    2.  Does the patient have kidney disease? NO    3.  Is the patient on dialysis? NO    If YES to any of these questions, exam will be reviewed with a Radiologist before administering contrast.

## 2019-02-28 NOTE — PROGRESS NOTES
Type of Visit  Established    Chief Complaint  Prostate cancer    HPI  Mr. Gagnon is a 53 year old male with recently diagnosed low volume intermediate risk prostate cancer.  He is doing well and denies current rectal bleeding or gross hematuria.   He denies fevers or chills.   Patient follows up today for review of staging scans.  I had recommended CT of the pelvis as staging study.  He recently underwent the CT scan and follows up to review the results and again discuss his treatment options.      Review of Systems  I reviewed the ROS with the patient.    Nursing Notes:   Michelle Shine LPN  2/28/2019 11:14 AM  Signed  Pt presents to clinic for CT results    Review of Systems:    Weight loss:    No     Recent fever/chills:  No   Night sweats:   No  Current skin rash:  No   Recent hair loss:  No  Heat intolerance:  No   Cold intolerance:  No  Chest pain:   No   Palpitations:   No  Shortness of breath:  No   Wheezing:   No  Constipation:    No   Diarrhea:   No   Nausea:   No   Vomiting:   No   Kidney/side pain:  No   Back pain:   Yes  Frequent headaches:  No   Dizziness:     No  Leg swelling:   Yes   Calf pain:    No          Family History  Family History   Problem Relation Age of Onset     Diabetes Mother         type 2     Hypertension Mother      Heart Disease Mother      Lipids Mother      Alcohol/Drug Father      Cancer Maternal Grandmother      Eye Disorder Maternal Grandfather      Heart Disease Maternal Grandfather      Cancer Maternal Grandfather      Cancer Paternal Grandmother      Prostate Cancer Paternal Grandfather      Other - See Comments Daughter         lupus     Kidney Disease Daughter      Kidney Disease Daughter        Physical Exam  /84 (BP Location: Right arm, Patient Position: Sitting, Cuff Size: Adult Large)   Pulse 68   Resp 16   Wt 126.8 kg (279 lb 9.6 oz)   BMI 35.90 kg/m    Constitutional: NAD, WDWN.  Cardiovascular: Regular rate.  Pulmonary/Chest: Respirations are even  and non-labored bilaterally.  Abdominal: Soft. No distension, tenderness, masses or guarding. No CVA tenderness.  Extremities: RIA x 4, Warm. No clubbing.  No cyanosis.    Skin: Pink, warm and dry.  No rashes noted.  Prostate:          30 grams, symmetric, no nodules or induration    Labs & Imaging  Results for JUSTUS BILLINGS (MRN [id number]) as of 1/31/2019 11:41   9/11/2013 11:11 5/12/2017 07:54 1/30/2019 07:49   PSA 3.50 5.08 (H) 6.39 (H)       Results for orders placed or performed during the hospital encounter of 02/28/19   CT Abdomen Pelvis w/Contrast    Narrative    PROCEDURE:  CT ABDOMEN PELVIS W CONTRAST    HISTORY:  prostate cancer staging; Prostate cancer (H)     TECHNIQUE:  Helical CT of the abdomen and pelvis was performed  following injection of intravenous contrast.     Sagittal and coronal reformatted images were reviewed.    COMPARISON:  None.    FINDINGS:      The lung bases are clear.    The liver, spleen, pancreas and adrenal glands are unremarkable. The  gallbladder is present.    The kidneys are intact.     The bowel is normal in caliber. The appendix is unremarkable. There is  a fat-containing periumbilical hernia.     There is no abdominal aortic aneurysm.    No free fluid, free air or adenopathy is present.      No suspicious osseous lesions are identified.      Impression    IMPRESSION:  No evidence of metastatic disease in the abdomen or  pelvis.    DAVID AREVALO MD       Pathology  I personally reviewed the pathology report  2/6/2019  Total cores positive  +2/12 cores  Highest [place] grade: 2/12 cores, 3+4, 40%  Negative cores  10/12 cores    PSA:       6.4  Calculated prostate volume based on TRUS: 27 grams  PSA Density:      0.24 ng/mL/g  T stage:      cT1c    Assessment  Mr. Billings is a 53 year old male with recently diagnosed radiographically localized low volume intermediate risk prostate cancer.  Reviewed his imaging today which was negative.    I again discussed the following  with the patient and his wife:   - options for his disease including active surveillance, radiation and surgery   - expected treatment course and side-effect profiles of the various treatments   - open and robotic-assisted radical prostatectomy   - brachytherapy and external-beam radiotherapy  His questions were answered to his satisfaction.    Plan  Patient plans to proceed with prostatectomy, he will call our clinic when he determines the location he prefers        I spent 26 minutes on this patient's visit and over 50% of this time was spent in face-to-face discussion and counseling regarding the diagnosis of prostate cancer, treatment options, risks and benefits of each treatment option.

## 2019-04-01 ENCOUNTER — TRANSFERRED RECORDS (OUTPATIENT)
Dept: HEALTH INFORMATION MANAGEMENT | Facility: OTHER | Age: 54
End: 2019-04-01

## 2019-08-13 ENCOUNTER — HOSPITAL ENCOUNTER (OUTPATIENT)
Facility: OTHER | Age: 54
End: 2019-08-13
Attending: UROLOGY | Admitting: UROLOGY
Payer: COMMERCIAL

## 2020-03-02 ENCOUNTER — HEALTH MAINTENANCE LETTER (OUTPATIENT)
Age: 55
End: 2020-03-02

## 2020-10-27 ENCOUNTER — NURSE TRIAGE (OUTPATIENT)
Dept: FAMILY MEDICINE | Facility: OTHER | Age: 55
End: 2020-10-27

## 2020-10-27 DIAGNOSIS — Z20.822 SUSPECTED 2019 NOVEL CORONAVIRUS INFECTION: Primary | ICD-10-CM

## 2020-10-27 NOTE — TELEPHONE ENCOUNTER
Pt cells [phone]    COVID 19 Nurse Triage Plan/Patient Instructions    Please be aware that novel coronavirus (COVID-19) may be circulating in the community. If you develop symptoms such as fever, cough, or SOB or if you have concerns about the presence of another infection including coronavirus (COVID-19), please contact your health care provider or visit [email or web address].     Disposition/Instructions    Home care recommended. Follow home care protocol based instructions.    Thank you for taking steps to prevent the spread of this virus.  o Limit your contact with others.  o Wear a simple mask to cover your cough.  o Wash your hands well and often.    Resources    [facility]: About COVID-19: [email or web address]    CDC: What to Do If You're Sick: www.cdc.gov/coronavirus/2019-ncov/about/steps-when-sick.html    CDC: Ending Home Isolation: www.cdc.gov/coronavirus/2019-ncov/hcp/disposition-in-home-patients.html     CDC: Caring for Someone: www.cdc.gov/coronavirus/2019-ncov/if-you-are-sick/care-for-someone.html     [facility]: Interim Guidance for Hospital Discharge to Home: www.[email or web address]/diseases/coronavirus/hcp/hospdischarge.pdf    [facility] clinical trials (COVID-19 research studies): clinicalaffairs.[facility]-clinical-trials     Below are the COVID-19 hotlines at the [place] [facility] ([facility]). Interpreters are available.   o For health questions: Call 283-041-2066 or 1-917.885.4515 (7 a.m. to 7 p.m.)  o For questions about schools and childcare: Call 473-159-7033 or 1-370.889.8982 (7 a.m. to 7 p.m.)                     Reason for Disposition    [1] COVID-19 infection suspected by caller or triager AND [2] mild symptoms (cough, fever, or others) AND [3] no complications or SOB    Additional Information    Negative: SEVERE difficulty breathing (e.g., struggling for each breath, speaks in single words)    Negative: Difficult to awaken or acting confused (e.g., disoriented,  "slurred speech)    Negative: Bluish (or gray) lips or face now    Negative: Shock suspected (e.g., cold/pale/clammy skin, too weak to stand, low BP, rapid pulse)    Negative: Sounds like a life-threatening emergency to the triager    Negative: [1] COVID-19 exposure AND [2] no symptoms    Negative: COVID-19 and Breastfeeding, questions about    Negative: [1] Adult with possible COVID-19 symptoms AND [2] triager concerned about severity of symptoms or other causes    Negative: SEVERE or constant chest pain or pressure (Exception: mild central chest pain, present only when coughing)    Negative: MODERATE difficulty breathing (e.g., speaks in phrases, SOB even at rest, pulse 100-120)    Negative: Patient sounds very sick or weak to the triager    Negative: MILD difficulty breathing (e.g., minimal/no SOB at rest, SOB with walking, pulse <100)    Negative: Chest pain or pressure    Negative: Fever > 103 F (39.4 C)    Negative: [1] Fever > 101 F (38.3 C) AND [2] age > 60    Negative: [1] Fever > 100.0 F (37.8 C) AND [2] bedridden (e.g., nursing home patient, CVA, chronic illness, recovering from surgery)    Negative: HIGH RISK patient (e.g., age > 64 years, diabetes, heart or lung disease, weak immune system) (Exception: Has already been evaluated by healthcare provider and has no new or worsening symptoms)    Negative: Fever present > 3 days (72 hours)    Negative: [1] Fever returns after gone for over 24 hours AND [2] symptoms worse or not improved    Negative: [1] Continuous (nonstop) coughing interferes with work or school AND [2] no improvement using cough treatment per protocol    Answer Assessment - Initial Assessment Questions  1. COVID-19 DIAGNOSIS: \"Who made your Coronavirus (COVID-19) diagnosis?\" \"Was it confirmed by a positive lab test?\" If not diagnosed by a HCP, ask \"Are there lots of cases (community spread) where you live?\" (See public health department website, if unsure)      No dx  2. ONSET: \"When did " "the COVID-19 symptoms start?\"       10/23/20  3. WORST SYMPTOM: \"What is your worst symptom?\" (e.g., cough, fever, shortness of breath, muscle aches)      HA  4. COUGH: \"Do you have a cough?\" If so, ask: \"How bad is the cough?\"        Mild cough  5. FEVER: \"Do you have a fever?\" If so, ask: \"What is your temperature, how was it measured, and when did it start?\"      T: 98.2  6. RESPIRATORY STATUS: \"Describe your breathing?\" (e.g., shortness of breath, wheezing, unable to speak)       denies  7. BETTER-SAME-WORSE: \"Are you getting better, staying the same or getting worse compared to yesterday?\"  If getting worse, ask, \"In what way?\"      same  8. HIGH RISK DISEASE: \"Do you have any chronic medical problems?\" (e.g., asthma, heart or lung disease, weak immune system, etc.)      afib  9. PREGNANCY: \"Is there any chance you are pregnant?\" \"When was your last menstrual period?\"      n/a  10. OTHER SYMPTOMS: \"Do you have any other symptoms?\"  (e.g., chills, fatigue, headache, loss of smell or taste, muscle pain, sore throat)        Sore throat, HA, fatigue, muscle pain    Protocols used: CORONAVIRUS (COVID-19) DIAGNOSED OR [id number] 8.4.20      "

## 2020-10-28 ENCOUNTER — OFFICE VISIT (OUTPATIENT)
Dept: FAMILY MEDICINE | Facility: OTHER | Age: 55
End: 2020-10-28
Attending: FAMILY MEDICINE
Payer: COMMERCIAL

## 2020-10-28 DIAGNOSIS — Z20.822 SUSPECTED 2019 NOVEL CORONAVIRUS INFECTION: ICD-10-CM

## 2020-10-28 PROCEDURE — U0003 INFECTIOUS AGENT DETECTION BY NUCLEIC ACID (DNA OR RNA); SEVERE ACUTE RESPIRATORY SYNDROME CORONAVIRUS 2 (SARS-COV-2) (CORONAVIRUS DISEASE [COVID-19]), AMPLIFIED PROBE TECHNIQUE, MAKING USE OF HIGH THROUGHPUT TECHNOLOGIES AS DESCRIBED BY CMS-2020-01-R: HCPCS | Performed by: FAMILY MEDICINE

## 2020-10-29 LAB
SARS-COV-2 RNA SPEC QL NAA+PROBE: NOT DETECTED
SPECIMEN SOURCE: NORMAL

## 2020-12-09 ENCOUNTER — NURSE TRIAGE (OUTPATIENT)
Dept: FAMILY MEDICINE | Facility: OTHER | Age: 55
End: 2020-12-09

## 2020-12-09 ENCOUNTER — HOSPITAL ENCOUNTER (EMERGENCY)
Facility: HOSPITAL | Age: 55
Discharge: HOME | End: 2020-12-09
Attending: NURSE PRACTITIONER | Admitting: NURSE PRACTITIONER
Payer: COMMERCIAL

## 2020-12-09 ENCOUNTER — APPOINTMENT (OUTPATIENT)
Dept: GENERAL RADIOLOGY | Facility: HOSPITAL | Age: 55
End: 2020-12-09
Attending: NURSE PRACTITIONER
Payer: COMMERCIAL

## 2020-12-09 VITALS
DIASTOLIC BLOOD PRESSURE: 88 MMHG | TEMPERATURE: 98.3 F | SYSTOLIC BLOOD PRESSURE: 141 MMHG | HEART RATE: 90 BPM | RESPIRATION RATE: 16 BRPM | OXYGEN SATURATION: 97 %

## 2020-12-09 DIAGNOSIS — R07.9 CHEST PAIN, UNSPECIFIED: Primary | ICD-10-CM

## 2020-12-09 DIAGNOSIS — L50.9 HIVES: ICD-10-CM

## 2020-12-09 DIAGNOSIS — M54.6 ACUTE RIGHT-SIDED THORACIC BACK PAIN: ICD-10-CM

## 2020-12-09 LAB
ALBUMIN SERPL-MCNC: 4 G/DL (ref 3.4–5)
ALP SERPL-CCNC: 66 U/L (ref 40–150)
ALT SERPL W P-5'-P-CCNC: 30 U/L (ref 0–70)
ANION GAP SERPL CALCULATED.3IONS-SCNC: 6 MMOL/L (ref 3–14)
AST SERPL W P-5'-P-CCNC: 25 U/L (ref 0–45)
BASOPHILS # BLD AUTO: 0 10E9/L (ref 0–0.2)
BASOPHILS NFR BLD AUTO: 0.2 %
BILIRUB SERPL-MCNC: 0.5 MG/DL (ref 0.2–1.3)
BUN SERPL-MCNC: 17 MG/DL (ref 7–30)
CALCIUM SERPL-MCNC: 9.4 MG/DL (ref 8.5–10.1)
CHLORIDE SERPL-SCNC: 106 MMOL/L (ref 94–109)
CO2 SERPL-SCNC: 27 MMOL/L (ref 20–32)
CREAT SERPL-MCNC: 0.95 MG/DL (ref 0.66–1.25)
D DIMER PPP FEU-MCNC: 0.6 UG/ML FEU (ref 0–0.5)
DIFFERENTIAL METHOD BLD: NORMAL
EOSINOPHIL # BLD AUTO: 0.1 10E9/L (ref 0–0.7)
EOSINOPHIL NFR BLD AUTO: 0.9 %
ERYTHROCYTE [DISTWIDTH] IN BLOOD BY AUTOMATED COUNT: 12.7 % (ref 10–15)
GFR SERPL CREATININE-BSD FRML MDRD: >90 ML/MIN/{1.73_M2}
GLUCOSE SERPL-MCNC: 89 MG/DL (ref 70–99)
HCT VFR BLD AUTO: 42.9 % (ref 40–53)
HGB BLD-MCNC: 15.2 G/DL (ref 13.3–17.7)
IMM GRANULOCYTES # BLD: 0 10E9/L (ref 0–0.4)
IMM GRANULOCYTES NFR BLD: 0.3 %
LIPASE SERPL-CCNC: 90 U/L (ref 73–393)
LYMPHOCYTES # BLD AUTO: 1.2 10E9/L (ref 0.8–5.3)
LYMPHOCYTES NFR BLD AUTO: 13.8 %
MCH RBC QN AUTO: 30.8 PG (ref 26.5–33)
MCHC RBC AUTO-ENTMCNC: 35.4 G/DL (ref 31.5–36.5)
MCV RBC AUTO: 87 FL (ref 78–100)
MONOCYTES # BLD AUTO: 0.6 10E9/L (ref 0–1.3)
MONOCYTES NFR BLD AUTO: 6.9 %
NEUTROPHILS # BLD AUTO: 6.7 10E9/L (ref 1.6–8.3)
NEUTROPHILS NFR BLD AUTO: 77.9 %
NRBC # BLD AUTO: 0 10*3/UL
NRBC BLD AUTO-RTO: 0 /100
PLATELET # BLD AUTO: 180 10E9/L (ref 150–450)
POTASSIUM SERPL-SCNC: 3.8 MMOL/L (ref 3.4–5.3)
PROT SERPL-MCNC: 7.1 G/DL (ref 6.8–8.8)
RBC # BLD AUTO: 4.94 10E12/L (ref 4.4–5.9)
SODIUM SERPL-SCNC: 139 MMOL/L (ref 133–144)
TROPONIN I SERPL-MCNC: <0.015 UG/L (ref 0–0.04)
WBC # BLD AUTO: 8.6 10E9/L (ref 4–11)

## 2020-12-09 PROCEDURE — U0003 INFECTIOUS AGENT DETECTION BY NUCLEIC ACID (DNA OR RNA); SEVERE ACUTE RESPIRATORY SYNDROME CORONAVIRUS 2 (SARS-COV-2) (CORONAVIRUS DISEASE [COVID-19]), AMPLIFIED PROBE TECHNIQUE, MAKING USE OF HIGH THROUGHPUT TECHNOLOGIES AS DESCRIBED BY CMS-2020-01-R: HCPCS | Performed by: NURSE PRACTITIONER

## 2020-12-09 PROCEDURE — 85025 COMPLETE CBC W/AUTO DIFF WBC: CPT | Performed by: NURSE PRACTITIONER

## 2020-12-09 PROCEDURE — C9803 HOPD COVID-19 SPEC COLLECT: HCPCS

## 2020-12-09 PROCEDURE — 84484 ASSAY OF TROPONIN QUANT: CPT | Performed by: NURSE PRACTITIONER

## 2020-12-09 PROCEDURE — 93005 ELECTROCARDIOGRAM TRACING: CPT

## 2020-12-09 PROCEDURE — 250N000013 HC RX MED GY IP 250 OP 250 PS 637: Performed by: NURSE PRACTITIONER

## 2020-12-09 PROCEDURE — 85379 FIBRIN DEGRADATION QUANT: CPT | Performed by: NURSE PRACTITIONER

## 2020-12-09 PROCEDURE — 36415 COLL VENOUS BLD VENIPUNCTURE: CPT | Performed by: NURSE PRACTITIONER

## 2020-12-09 PROCEDURE — 250N000012 HC RX MED GY IP 250 OP 636 PS 637: Performed by: NURSE PRACTITIONER

## 2020-12-09 PROCEDURE — 83690 ASSAY OF LIPASE: CPT | Performed by: NURSE PRACTITIONER

## 2020-12-09 PROCEDURE — 93010 ELECTROCARDIOGRAM REPORT: CPT | Performed by: INTERNAL MEDICINE

## 2020-12-09 PROCEDURE — 99284 EMERGENCY DEPT VISIT MOD MDM: CPT | Performed by: NURSE PRACTITIONER

## 2020-12-09 PROCEDURE — 80053 COMPREHEN METABOLIC PANEL: CPT | Performed by: NURSE PRACTITIONER

## 2020-12-09 PROCEDURE — 71045 X-RAY EXAM CHEST 1 VIEW: CPT

## 2020-12-09 PROCEDURE — 99285 EMERGENCY DEPT VISIT HI MDM: CPT | Mod: 25

## 2020-12-09 RX ORDER — DIPHENHYDRAMINE HCL 25 MG
50 CAPSULE ORAL ONCE
Status: COMPLETED | OUTPATIENT
Start: 2020-12-09 | End: 2020-12-09

## 2020-12-09 RX ORDER — FAMOTIDINE 20 MG/1
20 TABLET, FILM COATED ORAL 2 TIMES DAILY
Qty: 14 TABLET | Refills: 0 | Status: SHIPPED | OUTPATIENT
Start: 2020-12-09 | End: 2020-12-14

## 2020-12-09 RX ORDER — DIPHENHYDRAMINE HYDROCHLORIDE 50 MG/ML
25 INJECTION INTRAMUSCULAR; INTRAVENOUS ONCE
Status: DISCONTINUED | OUTPATIENT
Start: 2020-12-09 | End: 2020-12-09

## 2020-12-09 RX ORDER — METHYLPREDNISOLONE SODIUM SUCCINATE 125 MG/2ML
125 INJECTION, POWDER, LYOPHILIZED, FOR SOLUTION INTRAMUSCULAR; INTRAVENOUS ONCE
Status: DISCONTINUED | OUTPATIENT
Start: 2020-12-09 | End: 2020-12-09

## 2020-12-09 RX ORDER — PREDNISONE 20 MG/1
40 TABLET ORAL ONCE
Status: COMPLETED | OUTPATIENT
Start: 2020-12-09 | End: 2020-12-09

## 2020-12-09 RX ORDER — ASPIRIN 81 MG/1
324 TABLET, CHEWABLE ORAL ONCE
Status: DISCONTINUED | OUTPATIENT
Start: 2020-12-09 | End: 2020-12-09 | Stop reason: HOSPADM

## 2020-12-09 RX ORDER — PREDNISONE 20 MG/1
40 TABLET ORAL DAILY
Qty: 8 TABLET | Refills: 0 | Status: SHIPPED | OUTPATIENT
Start: 2020-12-09 | End: 2020-12-14

## 2020-12-09 RX ORDER — FAMOTIDINE 20 MG/1
40 TABLET, FILM COATED ORAL ONCE
Status: COMPLETED | OUTPATIENT
Start: 2020-12-09 | End: 2020-12-09

## 2020-12-09 RX ADMIN — DIPHENHYDRAMINE HYDROCHLORIDE 50 MG: 25 CAPSULE ORAL at 16:57

## 2020-12-09 RX ADMIN — PREDNISONE 40 MG: 20 TABLET ORAL at 16:56

## 2020-12-09 RX ADMIN — FAMOTIDINE 40 MG: 20 TABLET, FILM COATED ORAL at 16:57

## 2020-12-09 NOTE — DISCHARGE INSTRUCTIONS
(R07.9) Chest pain, unspecified  (primary encounter diagnosis)  (M54.6) Acute right-sided thoracic back pain  (L50.9) Hives  Nontoxic-appearing, pleasant 55-year old male presents ambulatory for evaluation of right sided back pain, sternal chest pain that started about 3 days ago. He had associated dyspnea, increased pain with inspiration. He also started having hive-like rash today that he has had in the past but after Benadryl symptoms did not improve. Work-up as above- no acute leukocytosis or anemia. No acute electrolyte, renal or hepatic abnormalities. CXR is normal. EKG shows a sinus rhythm without acute ST segment elevation or depression, no T wave changes. Troponin is negative. Given chest pain, back pain, increased pain with inspiration and dyspnea earlier today ddimer obtained and this is minimally elevated - discussed further evaluation for pulmonary embolism, dissection, others with CTA chest. Patient declines at this time and will return if new/worsening symptoms. Hives treated with steroids, benadryl, pepcid. He has no anaphylaxis symptoms of oropharyngeal edema, angioedema.   Recommend:  - START prednisone 40 mg once daily with breakfast x 4 more days  - START famotidine (Pepcid) 20 mg twice daily x 5-7 days for allergic reaction  - Continue with benadryl per package instructions for itching  - Consider starting daily antihistamine such as Allegra, Zyrtec, Claritin    You were tested for COVID today. Self-quarantine until results received in 72 hours.    RETURN TO THE ED WITH NEW OR WORSENING SYMPTOMS.    ED FOLLOW-UP WITH YOUR PRIMARY CARE PROVIDER IN 5-7 DAYS.   Discuss referral for allergy testing to further evaluate ongoing hives.       Michelle Pagan CNP    Results for orders placed or performed during the hospital encounter of 12/09/20   XR Chest Port 1 View     Status: None    Narrative    PROCEDURE:  XR CHEST PORT 1 VW    HISTORY:  chest pain.     COMPARISON:  2017    FINDINGS:   The  cardiac silhouette is normal in size. The pulmonary vasculature is  normal.  The lungs are clear. There is no pneumothorax or pleural  effusion.      Impression    IMPRESSION:  No acute cardiopulmonary disease.      JOSE NAIR MD   CBC with platelets differential     Status: None   Result Value Ref Range    WBC 8.6 4.0 - 11.0 10e9/L    RBC Count 4.94 4.4 - 5.9 10e12/L    Hemoglobin 15.2 13.3 - 17.7 g/dL    Hematocrit 42.9 40.0 - 53.0 %    MCV 87 78 - 100 fl    MCH 30.8 26.5 - 33.0 pg    MCHC 35.4 31.5 - 36.5 g/dL    RDW 12.7 10.0 - 15.0 %    Platelet Count 180 150 - 450 10e9/L    Diff Method Automated Method     % Neutrophils 77.9 %    % Lymphocytes 13.8 %    % Monocytes 6.9 %    % Eosinophils 0.9 %    % Basophils 0.2 %    % Immature Granulocytes 0.3 %    Nucleated RBCs 0 0 /100    Absolute Neutrophil 6.7 1.6 - 8.3 10e9/L    Absolute Lymphocytes 1.2 0.8 - 5.3 10e9/L    Absolute Monocytes 0.6 0.0 - 1.3 10e9/L    Absolute Eosinophils 0.1 0.0 - 0.7 10e9/L    Absolute Basophils 0.0 0.0 - 0.2 10e9/L    Abs Immature Granulocytes 0.0 0 - 0.4 10e9/L    Absolute Nucleated RBC 0.0    Comprehensive metabolic panel     Status: None   Result Value Ref Range    Sodium 139 133 - 144 mmol/L    Potassium 3.8 3.4 - 5.3 mmol/L    Chloride 106 94 - 109 mmol/L    Carbon Dioxide 27 20 - 32 mmol/L    Anion Gap 6 3 - 14 mmol/L    Glucose 89 70 - 99 mg/dL    Urea Nitrogen 17 7 - 30 mg/dL    Creatinine 0.95 0.66 - 1.25 mg/dL    GFR Estimate >90 >60 mL/min/[1.73_m2]    GFR Estimate If Black >90 >60 mL/min/[1.73_m2]    Calcium 9.4 8.5 - 10.1 mg/dL    Bilirubin Total 0.5 0.2 - 1.3 mg/dL    Albumin 4.0 3.4 - 5.0 g/dL    Protein Total 7.1 6.8 - 8.8 g/dL    Alkaline Phosphatase 66 40 - 150 U/L    ALT 30 0 - 70 U/L    AST 25 0 - 45 U/L   D dimer quantitative     Status: Abnormal   Result Value Ref Range    D Dimer 0.6 (H) 0.0 - 0.50 ug/ml FEU   Lipase     Status: None   Result Value Ref Range    Lipase 90 73 - 393 U/L   Troponin I     Status:  None   Result Value Ref Range    Troponin I ES <0.015 0.000 - 0.045 ug/L

## 2020-12-09 NOTE — ED NOTES
Discharge instructions reviewed with patient, and patient verbalized understanding. [facility]. Pt ambulated with a steady gait to the exit.

## 2020-12-09 NOTE — ED PROVIDER NOTES
"  History     Chief Complaint   Patient presents with     Chest Pain     For the past 3 days     Back Pain     For the past 3 days     HPI  Rajendra Gagnon is a 55 year old male who presents ambulatory for evaluation of possible heart attack- 3 day history of back and chest pain.    Back pain \"like a muscle spasm\" about 3 days ago and continues with discomfort.\"It feels muscular because I can stretch it and it feels better. The wife rubbed it down with icy hot and that felt better.\"   Chest discomfort - feels like a ball in chest. Tried a TUMs at home - helped \"a little bit.\" Taking a deep breath increases chest discomfort. Currently 0/10 discomfort at rest with easy breathing. Increases up to 1-2/10 when takes a deep inhaler. No associated diaphoresis, nausea, vomiting.   Tried going for a walk because \"stomach rumbling and thought maybe it was gas\" - \"yes and no\" helpful \"for a little bit until I got into my office, so I sat and rested.\"     Coughing \"a little bit just this afternoon.\" Dyspnea - \"not anymore\" but reports felt short of breath earlier today. Hands, feet, head start itching due to rash that he reports is chronic every fall/winter. About 1 week ago was last presentation - took 1 benadryl and symptoms resolved.     Denies recent symptoms of fever but did have chills today, rhinorrhea, congestion, otalgia, pharyngitis, abdominal pain, urinary symptoms, headache. Denies known exposures.       Allergies:  Allergies   Allergen Reactions     Aspartame Nausea     Venlafaxine Other (See Comments)     Night terrors     Zoloft [Sertraline] Other (See Comments)     Head rush, hives and agitation       Problem List:    Patient Active Problem List    Diagnosis Date Noted     Prostate cancer (H) 02/14/2019     Priority: Medium     Adjustment disorder with mixed anxiety and depressed mood 11/14/2017     Priority: Medium     Varicose veins of both lower extremities 05/12/2017     Priority: Medium     ACP (advance care " planning) 05/11/2016     Priority: Medium     Advance Care Planning 5/11/2016: ACP Review of Chart / Resources Provided:  Reviewed chart for advance care plan.  Rajendra Gagnon has no plan or code status on file. Discussed available resources and provided with information.   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 Provider to review       Right acoustic neuroma 03/14/2012     Priority: Medium        Past Medical History:    Past Medical History:   Diagnosis Date     Dysthymia 3/26/2012     Hearing loss in right ear 3/14/2012     History of atrial fibrillation 4/23/2012     Hypertriglyceridemia      Right acoustic neuroma 3/14/2012     Tinnitus, unspecified 3/14/2012     Umbilical hernia without obstruction and without gangrene 5/11/2016     Varicose veins      Varicose veins of both lower extremities 5/12/2017       Past Surgical History:    Past Surgical History:   Procedure Laterality Date     acustic neuroma removed  3/2007    right     BIOPSY PROSTATE TRANSRECTAL N/A 2/5/2019    Procedure: TRANSRECTAL Ultrasound BIOPSY of PROSTATE;  Surgeon: Filemon Betts MD;  Location: [facility] OR     COLONOSCOPY N/A 1/3/2018    Repeat in 2028//Procedure: COLONOSCOPY;  COLONOSCOPY;  Surgeon: Reggie Ron DO;  Location: [facility] OR     funnel chest[      states had chest reconstruction     HERNIA REPAIR       ORTHOPEDIC SURGERY      right knee surgery     PE TUBES         Family History:    Family History   Problem Relation Age of Onset     Diabetes Mother         type 2     Hypertension Mother      Heart Disease Mother      Lipids Mother      Alcohol/Drug Father       Cancer Maternal Grandmother      Eye Disorder Maternal Grandfather      Heart Disease Maternal Grandfather      Cancer Maternal Grandfather      Cancer Paternal Grandmother      Prostate Cancer Paternal Grandfather      Other - See Comments Daughter         lupus     Kidney Disease Daughter      Kidney Disease Daughter        Social History:  Marital Status:   [2]  Social History     Tobacco Use     Smoking status: Never Smoker     Smokeless tobacco: Never Used   Substance Use Topics     Alcohol use: Yes     Alcohol/week: 3.3 standard drinks     Types: 4 Cans of beer per week     Comment: rarely     Drug use: No        Medications:         famotidine (PEPCID) 20 MG tablet       predniSONE (DELTASONE) 20 MG tablet       Acetaminophen (TYLENOL PO)       calcium & magnesium carbonates 311-232 MG TABS       CALCIUM-MAGNESIUM-ZINC PO       cefdinir (OMNICEF) 300 MG capsule       DiphenhydrAMINE HCl (BENADRYL PO)       metroNIDAZOLE (METROGEL) 0.75 % topical gel       Multiple Vitamins-Minerals (MULTIVITAMIN OR)       Omega-3 Fatty Acids (OMEGA-3 FISH OIL PO)       vitamin B complex with vitamin C (VITAMIN  B COMPLEX) tablet       VITAMIN D, CHOLECALCIFEROL, PO       vitamin E 400 units TABS          Review of Systems   Constitutional: Positive for chills. Negative for diaphoresis and fever.   HENT: Negative for congestion, ear pain, rhinorrhea, sore throat and trouble swallowing.    Respiratory: Positive for cough and shortness of breath.    Cardiovascular: Positive for chest pain. Negative for palpitations and leg swelling.   Gastrointestinal: Negative for abdominal pain, diarrhea, nausea and vomiting.   Genitourinary: Negative for difficulty urinating.   Musculoskeletal: Positive for back pain.   Skin: Positive for rash.   Neurological: Negative for dizziness and headaches.       Physical Exam   BP: 149/97  Pulse: 95  Temp: 98.3  F (36.8  C)  Resp: 20  SpO2: 100 %      Physical Exam  Constitutional:        General: He is not in acute distress.     Appearance: Normal appearance. He is not ill-appearing, toxic-appearing or diaphoretic.   Cardiovascular:      Rate and Rhythm: Normal rate and regular rhythm.      Heart sounds: S1 normal and S2 normal. No murmur. No friction rub. No gallop.    Pulmonary:      Effort: Pulmonary effort is normal.      Breath sounds: Normal breath sounds. No wheezing, rhonchi or rales.   Abdominal:      Palpations: Abdomen is soft.      Tenderness: There is no abdominal tenderness. There is no guarding or rebound.   Musculoskeletal:      Cervical back: He exhibits normal range of motion and no tenderness.      Thoracic back: He exhibits normal range of motion and no tenderness.      Lumbar back: He exhibits normal range of motion and no tenderness.        Back:       Right lower leg: No edema.      Left lower leg: No edema.   Skin:     General: Skin is warm and dry.      Capillary Refill: Capillary refill takes less than 2 seconds.      Coloration: Skin is not pale.   Neurological:      Mental Status: He is alert and oriented to person, place, and time.      Gait: Gait is intact.   Psychiatric:         Mood and Affect: Mood normal.         Speech: Speech normal.         Behavior: Behavior normal. Behavior is cooperative.         ED Course     ED Course as of Dec 10 1907   Wed Dec 09, 2020   1650 Patient re-evaluated - continues to have resolved chest pain, back pain improved, no dyspnea. Reviewed results with patient. Discussed ddimer results and concerns given chest pain with pain in back, dyspnea earlier today, pain with inspiration - concern for PE and need to CTA chest to further evaluate. He thinks pain is muscular and all other symptoms resolved - declines CTA chest at this time and states he will come back.  Rash on arms worsened and consistent with hives. Plan for steroids and antihistamine.  Michelle Pagan CNP on 12/9/2020 at 4:52 PM          Procedures               EKG  Interpretation:      Interpreted by Michelle Pagan CNP  Time reviewed: 1452  Symptoms at time of EKG: chest pain, back pain   Rhythm: normal sinus   Rate: normal  Axis: RIGHT  Ectopy: none  Conduction: normal  ST Segments/ T Waves: No ST-T wave changes  Q Waves: none  Comparison to prior: Unchanged from 3/2018 EKG      No results found for this or any previous visit (from the past 24 hour(s)).    Medications   predniSONE (DELTASONE) tablet 40 mg (40 mg Oral Given 12/9/20 1656)   diphenhydrAMINE (BENADRYL) capsule 50 mg (50 mg Oral Given 12/9/20 1657)   famotidine (PEPCID) tablet 40 mg (40 mg Oral Given 12/9/20 1657)       Assessments & Plan (with Medical Decision Making)     I have reviewed the nursing notes.    I have reviewed the findings, diagnosis, plan and need for follow up with the patient.  (R07.9) Chest pain, unspecified  (primary encounter diagnosis)  (M54.6) Acute right-sided thoracic back pain  (L50.9) Hives  Nontoxic-appearing, pleasant 55-year old male presents ambulatory for evaluation of right sided back pain, sternal chest pain that started about 3 days ago. He had associated dyspnea, increased pain with inspiration. He also started having hive-like rash today that he has had in the past but after Benadryl symptoms did not improve. Work-up as above- no acute leukocytosis or anemia. No acute electrolyte, renal or hepatic abnormalities. CXR is normal. EKG shows a sinus rhythm without acute ST segment elevation or depression, no T wave changes. Troponin is negative. Given chest pain, back pain, increased pain with inspiration and dyspnea earlier today ddimer obtained and this is minimally elevated - discussed further evaluation for pulmonary embolism, dissection, others with CTA chest. Patient declines at this time and will return if new/worsening symptoms. Hives treated with steroids, benadryl, pepcid. He has no anaphylaxis symptoms of oropharyngeal edema, angioedema.   Recommend:  - START  prednisone 40 mg once daily with breakfast x 4 more days  - START famotidine (Pepcid) 20 mg twice daily x 5-7 days for allergic reaction  - Continue with benadryl per package instructions for itching  - Consider starting daily antihistamine such as Allegra, Zyrtec, Claritin    You were tested for COVID today. Self-quarantine until results received in 72 hours.    RETURN TO THE ED WITH NEW OR WORSENING SYMPTOMS.    ED FOLLOW-UP WITH YOUR PRIMARY CARE PROVIDER IN 5-7 DAYS.   Discuss referral for allergy testing to further evaluate ongoing hives.       Michelle Pagan CNP          Discharge Medication List as of 12/9/2020  5:15 PM      START taking these medications    Details   famotidine (PEPCID) 20 MG tablet Take 1 tablet (20 mg) by mouth 2 times daily for 7 days, Disp-14 tablet, R-0, E-Prescribe      predniSONE (DELTASONE) 20 MG tablet Take 2 tablets (40 mg) by mouth daily for 4 days Take two tablets (= 40mg) each day for 5 (five) days, Disp-8 tablet, R-0, E-Prescribe             Final diagnoses:   Chest pain, unspecified   Acute right-sided thoracic back pain   Hives       12/9/2020   [facility]     Michelle Pagan CNP  12/10/20 [id number]

## 2020-12-09 NOTE — TELEPHONE ENCOUNTER
"Patient called with symptoms of lightheaded, heart palpitations, nausea, central sternal chest pain. Patient rates chest pain as mild.  Patient states he also has pain in his back right shoulder blade for the past 2-3 days. Patient states heart palpitations have been present since this morning. Patient states he feels, \"off.\" Patient advised to be evaluated in the ED. Patient verbalized understanding.  "

## 2020-12-09 NOTE — ED AVS SNAPSHOT
[facility]  [address]  [place] [address]  Phone: [phone]                                    Rajendra Gagnon   MRN: [id number]    Department: [facility]   Date of Visit: 12/9/2020           After Visit Summary Signature Page    I have received my discharge instructions, and my questions have been answered. I have discussed any challenges I see with this plan with the nurse or doctor.    ..........................................................................................................................................  Patient/Patient Representative Signature      ..........................................................................................................................................  Patient Representative Print Name and Relationship to Patient    ..................................................               ................................................  Date                                   Time    ..........................................................................................................................................  Reviewed by Signature/Title    ...................................................              ..............................................  Date                                               Time          22EPIC Rev 08/18

## 2020-12-10 LAB
LABORATORY COMMENT REPORT: NORMAL
SARS-COV-2 RNA SPEC QL NAA+PROBE: NEGATIVE
SARS-COV-2 RNA SPEC QL NAA+PROBE: NORMAL
SPECIMEN SOURCE: NORMAL
SPECIMEN SOURCE: NORMAL

## 2020-12-10 ASSESSMENT — ENCOUNTER SYMPTOMS
HEADACHES: 0
FEVER: 0
VOMITING: 0
DIFFICULTY URINATING: 0
ABDOMINAL PAIN: 0
COUGH: 1
PALPITATIONS: 0
SORE THROAT: 0
BACK PAIN: 1
TROUBLE SWALLOWING: 0
DIZZINESS: 0
SHORTNESS OF BREATH: 1
DIARRHEA: 0
CHILLS: 1
DIAPHORESIS: 0
NAUSEA: 0
RHINORRHEA: 0

## 2020-12-14 ENCOUNTER — HEALTH MAINTENANCE LETTER (OUTPATIENT)
Age: 55
End: 2020-12-14

## 2020-12-14 ENCOUNTER — HOSPITAL ENCOUNTER (EMERGENCY)
Facility: HOSPITAL | Age: 55
Discharge: HOME | End: 2020-12-14
Attending: EMERGENCY MEDICINE | Admitting: EMERGENCY MEDICINE
Payer: COMMERCIAL

## 2020-12-14 VITALS
RESPIRATION RATE: 13 BRPM | DIASTOLIC BLOOD PRESSURE: 91 MMHG | SYSTOLIC BLOOD PRESSURE: 137 MMHG | OXYGEN SATURATION: 97 % | HEART RATE: 75 BPM | TEMPERATURE: 98.1 F

## 2020-12-14 DIAGNOSIS — L50.9 URTICARIA: Primary | ICD-10-CM

## 2020-12-14 PROCEDURE — 99283 EMERGENCY DEPT VISIT LOW MDM: CPT | Performed by: EMERGENCY MEDICINE

## 2020-12-14 PROCEDURE — 99283 EMERGENCY DEPT VISIT LOW MDM: CPT

## 2020-12-14 PROCEDURE — 86160 COMPLEMENT ANTIGEN: CPT | Performed by: EMERGENCY MEDICINE

## 2020-12-14 RX ORDER — PREDNISONE 20 MG/1
TABLET ORAL
Qty: 9 TABLET | Refills: 0 | Status: SHIPPED | OUTPATIENT
Start: 2020-12-14 | End: 2021-01-06

## 2020-12-14 RX ORDER — EPINEPHRINE 0.3 MG/.3ML
0.3 INJECTION SUBCUTANEOUS
Qty: 1 EACH | Refills: 3 | Status: SHIPPED | OUTPATIENT
Start: 2020-12-14 | End: 2022-09-14

## 2020-12-14 ASSESSMENT — ENCOUNTER SYMPTOMS
ABDOMINAL PAIN: 0
CONFUSION: 0
EYE REDNESS: 0
HEADACHES: 0
FEVER: 0
NECK STIFFNESS: 0
ARTHRALGIAS: 0
SHORTNESS OF BREATH: 0
COLOR CHANGE: 0
DIFFICULTY URINATING: 0

## 2020-12-14 NOTE — LETTER
December 14, 2020      Rajendra Gagnon  [address]  [place] [address]        To Whom It May Concern:    Rajendra Gagnon was seen in our emergency department. He may return to work without restrictions when feeling better.      Sincerely,        Azael Solis MD on 12/14/2020 at 12:29 PM

## 2020-12-14 NOTE — ED NOTES
Reports hives that come and go since Wednesday. Has been taking benadryl q5h since then. No swelling of throat or tongue. Slight SOB. No stridor. States this happens every fall. He has had prednisone in the past for this.

## 2020-12-14 NOTE — ED AVS SNAPSHOT
[facility]  [address]  [place] [address]  Phone: [phone]                                    Rajendra Gagnon   MRN: [id number]    Department: [facility]   Date of Visit: 12/14/2020           After Visit Summary Signature Page    I have received my discharge instructions, and my questions have been answered. I have discussed any challenges I see with this plan with the nurse or doctor.    ..........................................................................................................................................  Patient/Patient Representative Signature      ..........................................................................................................................................  Patient Representative Print Name and Relationship to Patient    ..................................................               ................................................  Date                                   Time    ..........................................................................................................................................  Reviewed by Signature/Title    ...................................................              ..............................................  Date                                               Time          22EPIC Rev 08/18

## 2020-12-14 NOTE — ED NOTES
Patient given discharge instructions to fill epinephrine and prednisone at [facility]. Follow up with allergy testing referral. Return for any increased SOB, swelling of tongue/ throat, chest pain.     Patient verbalized understanding. Patient condition stable upon discharge.

## 2020-12-14 NOTE — ED PROVIDER NOTES
"  History     Chief Complaint   Patient presents with     Allergic Reaction     hives, since last wednesday      HPI  Rajendra Gagnon is a 55 year old male who presented to the emergency department with complaints of hives all over the body for the last 5 days.  Patient was seen in the emergency department 4 days ago and started on prednisone for 4 days and has been using Benadryl as needed.  He is feeling slight shortness of breath but no wheezing, tongue swelling, difficulty swallowing or breathing.  No fever, chills, or COVID-19 exposure.  Labs done 4 days ago showed normal CBC, CMP, D-dimer of 0.6 with negative COVID-19 and clear chest x-ray.  Patient suspects that he is allergic to something in the environment/ inhaled/ swallowed but he has not had allergy testing recently.  These urticarial rashes are recurrent every year \"almost like clockwork\"    Allergies:  Allergies   Allergen Reactions     Aspartame Nausea     Venlafaxine Other (See Comments)     Night terrors     Zoloft [Sertraline] Other (See Comments)     Head rush, hives and agitation       Problem List:    Patient Active Problem List    Diagnosis Date Noted     Prostate cancer (H) 02/14/2019     Priority: Medium     Adjustment disorder with mixed anxiety and depressed mood 11/14/2017     Priority: Medium     Varicose veins of both lower extremities 05/12/2017     Priority: Medium     ACP (advance care planning) 05/11/2016     Priority: Medium     Advance Care Planning 5/11/2016: ACP Review of Chart / Resources Provided:  Reviewed chart for advance care plan.  Rajendra Gagnon has no plan or code status on file. Discussed available resources and provided with information.   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 Provider to review       Right acoustic neuroma 03/14/2012     Priority: Medium        Past Medical History:    Past Medical History:   Diagnosis Date     Dysthymia 3/26/2012     Hearing loss in right ear 3/14/2012     History of atrial fibrillation 4/23/2012     Hypertriglyceridemia      Right acoustic neuroma 3/14/2012     Tinnitus, unspecified 3/14/2012     Umbilical hernia without obstruction and without gangrene 5/11/2016     Varicose veins      Varicose veins of both lower extremities 5/12/2017       Past Surgical History:    Past Surgical History:   Procedure Laterality Date     acustic neuroma removed  3/2007    right     BIOPSY PROSTATE TRANSRECTAL N/A 2/5/2019    Procedure: TRANSRECTAL Ultrasound BIOPSY of PROSTATE;  Surgeon: Filemon Betts MD;  Location: [facility] OR     COLONOSCOPY N/A 1/3/2018    Repeat in 2028//Procedure: COLONOSCOPY;  COLONOSCOPY;  Surgeon: Reggie Ron DO;  Location: [facility] OR     funnel chest[      states had chest reconstruction     HERNIA REPAIR       ORTHOPEDIC SURGERY      right knee surgery     PE TUBES         Family History:    Family History   Problem Relation Age of Onset     Diabetes Mother         type 2     Hypertension Mother      Heart Disease Mother      Lipids Mother      Alcohol/Drug Father      Cancer Maternal Grandmother      Eye Disorder Maternal Grandfather      Heart Disease Maternal Grandfather      Cancer Maternal Grandfather      Cancer Paternal Grandmother      Prostate Cancer Paternal Grandfather      Other - See Comments Daughter         lupus     Kidney Disease Daughter      Kidney Disease Daughter        Social History:  Marital Status:   [2]  Social History     Tobacco Use     Smoking status: Never Smoker     Smokeless tobacco: Never Used   Substance Use Topics     Alcohol use: Yes     Alcohol/week: 3.3 standard drinks      Types: 4 Cans of beer per week     Comment: rarely     Drug use: No        Medications:         DiphenhydrAMINE HCl (BENADRYL PO)       EPINEPHrine (ANY BX GENERIC EQUIV) 0.3 MG/0.3ML injection 2-pack       Multiple Vitamins-Minerals (MULTIVITAMIN OR)       predniSONE (DELTASONE) 20 MG tablet       Acetaminophen (TYLENOL PO)       calcium & magnesium carbonates 311-232 MG TABS       CALCIUM-MAGNESIUM-ZINC PO       metroNIDAZOLE (METROGEL) 0.75 % topical gel       Omega-3 Fatty Acids (OMEGA-3 FISH OIL PO)       vitamin B complex with vitamin C (VITAMIN  B COMPLEX) tablet       VITAMIN D, CHOLECALCIFEROL, PO       vitamin E 400 units TABS          Review of Systems   Constitutional: Negative for fever.   HENT: Negative for congestion.    Eyes: Negative for redness.   Respiratory: Negative for shortness of breath.    Cardiovascular: Negative for chest pain.   Gastrointestinal: Negative for abdominal pain.   Genitourinary: Negative for difficulty urinating.   Musculoskeletal: Negative for arthralgias and neck stiffness.   Skin: Positive for rash. Negative for color change.   Neurological: Negative for headaches.   Psychiatric/Behavioral: Negative for confusion.   All other systems reviewed and are negative.      Physical Exam   BP: (!) 153/114  Pulse: 101  Temp: 98.1  F (36.7  C)  Resp: 14  SpO2: 99 %      Physical Exam  Vitals signs and nursing note reviewed.   Constitutional:       General: He is not in acute distress.     Appearance: He is well-developed. He is not diaphoretic.   HENT:      Head: Normocephalic and atraumatic.   Eyes:      Pupils: Pupils are equal, round, and reactive to light.   Cardiovascular:      Rate and Rhythm: Normal rate and regular rhythm.      Heart sounds: Normal heart sounds.   Pulmonary:      Effort: Pulmonary effort is normal. No respiratory distress.      Breath sounds: Normal breath sounds. No stridor.   Abdominal:      General: Bowel sounds are normal. There is no distension.       Tenderness: There is no abdominal tenderness.   Musculoskeletal:         General: No tenderness or deformity.   Skin:     General: Skin is warm and dry.      Findings: Rash present. No erythema.   Neurological:      Mental Status: He is alert and oriented to person, place, and time.      Cranial Nerves: No cranial nerve deficit.                         ED Course       Procedures      No results found for this or any previous visit (from the past 24 hour(s)).    Medications - No data to display    Assessments & Plan (with Medical Decision Making)   Urticaria: Recurrent episodes of urticaria on a yearly basis.  Patient has had prednisone for the last 3 days but still urticaria persists.  He still using Benadryl.  Please see photographs above.  C1 esterase inhibitor levels ordered.  We will give a longer taper dose of prednisone for 9 days.  Advised to use Benadryl only with itchiness.  Return to ED if he develops shortness of breath or hives do not clear after completion of prednisone.  Referred for allergy testing.  Discharged home in stable condition.      I have reviewed the nursing notes.    I have reviewed the findings, diagnosis, plan and need for follow up with the patient.    Discharge Medication List as of 12/14/2020 12:34 PM      START taking these medications    Details   EPINEPHrine (ANY BX GENERIC EQUIV) 0.3 MG/0.3ML injection 2-pack Inject 0.3 mLs (0.3 mg) into the muscle once as needed for anaphylaxis, Disp-1 each, R-3, E-Prescribe      predniSONE (DELTASONE) 20 MG tablet 2 tabs day 1-2, then 1 tab days 3-4, then 1/2 tab daily for 6 days, Disp-9 tablet, R-0, E-Prescribe             Final diagnoses:   Urticaria       12/14/2020   [facility]     Azael Solis MD  12/14/20 [id number]

## 2020-12-14 NOTE — ED TRIAGE NOTES
Reports hives since Wednesday. Unknown what he is allergic to causing this reaction. Has been taking 50mg benadryl q5h since Wednesday, last at 0800 today. Reports slight SOB, lips feel slightly numb. No swelling of throat or tongue. No Hx of anaphylaxis.

## 2020-12-18 ENCOUNTER — VIRTUAL VISIT (OUTPATIENT)
Dept: OTOLARYNGOLOGY | Facility: OTHER | Age: 55
End: 2020-12-18
Attending: NURSE PRACTITIONER
Payer: COMMERCIAL

## 2020-12-18 DIAGNOSIS — H90.5 SENSORINEURAL HEARING LOSS (SNHL) OF RIGHT EAR, UNSPECIFIED HEARING STATUS ON CONTRALATERAL SIDE: ICD-10-CM

## 2020-12-18 DIAGNOSIS — J30.2 PERENNIAL ALLERGIC RHINITIS WITH SEASONAL VARIATION: Primary | ICD-10-CM

## 2020-12-18 DIAGNOSIS — Z86.018 HISTORY OF ACOUSTIC NEUROMA: ICD-10-CM

## 2020-12-18 DIAGNOSIS — L50.9 URTICARIA: ICD-10-CM

## 2020-12-18 DIAGNOSIS — J30.89 PERENNIAL ALLERGIC RHINITIS WITH SEASONAL VARIATION: Primary | ICD-10-CM

## 2020-12-18 DIAGNOSIS — Z87.898 HISTORY OF ANGIOEDEMA: ICD-10-CM

## 2020-12-18 PROCEDURE — 99213 OFFICE O/P EST LOW 20 MIN: CPT | Mod: 95 | Performed by: NURSE PRACTITIONER

## 2020-12-18 NOTE — PROGRESS NOTES
"Rajendra Gagnon is a 55 year old male who is being evaluated via a billable telephone visit.      The patient has been notified of following:     \"This telephone visit will be conducted via a call between you and your physician/provider. We have found that certain health care needs can be provided without the need for a physical exam.  This service lets us provide the care you need with a short phone conversation.  If a prescription is necessary we can send it directly to your pharmacy.  If lab work is needed we can place an order for that and you can then stop by our lab to have the test done at a later time.    Telephone visits are billed at different rates depending on your insurance coverage. During this emergency period, for some insurers they may be billed the same as an in-person visit.  Please reach out to your insurance provider with any questions.    If during the course of the call the physician/provider feels a telephone visit is not appropriate, you will not be charged for this service.\"    Patient has given verbal consent for Telephone visit?  Yes    What phone number would you like to be contacted at? [phone]    How would you like to obtain your [facility]? [facility]    Otol[facility]ology Note         Chief Complaint:     Patient presents with:  Allergy Consult           History of Present Illness:     Rajendra is a 55 year old male who I am seeing via telephone visit for allergy consultation.      He reports a several year history of hives that he experiences on a yearly basis in the late fall/early winter.  Symptoms start with itchy scalp, palms, feet and eventually turn into hives.  Hives usually start on hands, feet and ankles and move to all over body.  He was seen in the ED on 12/14 for hives, pictures below are from his ED visit.  A C1 esterase level was ordered and is pending.  He was treated with Prednisone with improvement.  He has an epi pen that he keeps with him.     He has had previous work up in the " past with Dr Mota, Dr Lundberg and Dr Boggs for urticaria.  He was allergy tested in April 2009, moderate sensitivities to grass, birch, dust, alternaria, thistle, elm, oak, grain smut and dog,  Low sensitives to ragweed maple, aspergillus, pigweed, kochia, gayle pine.  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  He was treated with 3 months of keflex.   He has been treated with Zyrtec in the past with improvement in hives/angioedema symptoms.  In reviewing past medical records it was suggested that this could be idiopathic urticaria but definitive diagnosis was not made.  He did have a normal C1 inhibitor in April 2008.      Of note he also has a history of acoustic neuroma with surgical removal and no serviceable hearing in the right ear.       Late fall/early winter he has itchy scalp, itchy palms, and feet.  Mild case he takes benadryl. He has had it consistently yearly     He has also had swelling in hands, different parts of body.  He had throat swelling and was seen in the ED.  He has increased heart rate, dizzy, nausea, short of breath and wheezy.     He reports symptoms have been present late fall/early 1 time per year.      He has symptoms that start at home or work.  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      Hives start on hands and ankle, feet, and go all over the body.    He eyelid swelling, lip swelling    He is not currently on allergy medications, he was on it in the past    History of chest surgery for pectus excavatum  History of PE tubes  No hearing in right ear  He has seasonal allergy symptoms  He has stuffy nose at night  He feels he is more mouth breather  No facial pain or pressure.   He feels more congested in winter - close up    Resides in an older  home with a basement. There is questionable water or mold.     Pictures from urticaria in ED                  Medications:     Current Outpatient Rx   Medication Sig Dispense Refill     Acetaminophen (TYLENOL PO) Take 650 mg by mouth       calcium & magnesium carbonates 311-232 MG TABS Take 1 tablet by mouth daily        CALCIUM-MAGNESIUM-ZINC PO Take 1 tablet by mouth every other day       DiphenhydrAMINE HCl (BENADRYL PO) Take 50 mg by mouth        EPINEPHrine (ANY BX GENERIC EQUIV) 0.3 MG/0.3ML injection 2-pack Inject 0.3 mLs (0.3 mg) into the muscle once as needed for anaphylaxis 1 each 3     Multiple Vitamins-Minerals (MULTIVITAMIN OR) Take 1 tablet by mouth daily with food.       predniSONE (DELTASONE) 20 MG tablet 2 tabs day 1-2, then 1 tab days 3-4, then 1/2 tab daily for 6 days 9 tablet 0     vitamin B complex with vitamin C (VITAMIN  B COMPLEX) tablet Take 0.5 tablets by mouth daily       VITAMIN D, CHOLECALCIFEROL, PO Take 2,000 Units by mouth daily        vitamin E 400 units TABS Take 400 Units by mouth daily       metroNIDAZOLE (METROGEL) 0.75 % topical gel ,Apply to redness of the face morning and bedtime (Patient not taking: Reported on 12/18/2020) 45 g 3     Omega-3 Fatty Acids (OMEGA-3 FISH OIL PO) Take 1 g by mouth every other day              Allergies:     Allergies: Aspartame, Venlafaxine, and Zoloft [sertraline]          Past Medical History:     Past Medical History:   Diagnosis Date     Dysthymia 3/26/2012     Hearing loss in right ear 3/14/2012     History of atrial fibrillation 4/23/2012     Hypertriglyceridemia      Right acoustic neuroma 3/14/2012     Tinnitus, unspecified 3/14/2012     Umbilical hernia without obstruction and without gangrene 5/11/2016     Varicose veins      Varicose veins of both lower extremities 5/12/2017            Past Surgical History:     Past Surgical History:   Procedure Laterality Date     acustic neuroma removed  3/2007    right     BIOPSY PROSTATE  TRANSRECTAL N/A 2/5/2019    Procedure: TRANSRECTAL Ultrasound BIOPSY of PROSTATE;  Surgeon: Filemon Betts MD;  Location: [facility] OR     COLONOSCOPY N/A 1/3/2018    Repeat in 2028//Procedure: COLONOSCOPY;  COLONOSCOPY;  Surgeon: Reggie Ron DO;  Location: [facility] OR     funnel chest[      states had chest reconstruction     HERNIA REPAIR       ORTHOPEDIC SURGERY      right knee surgery     PE TUBES         ENT family history reviewed         Social History:     Social History     Tobacco Use     Smoking status: Never Smoker     Smokeless tobacco: Never Used   Substance Use Topics     Alcohol use: Yes     Alcohol/week: 3.3 standard drinks     Types: 4 Cans of beer per week     Comment: rarely     Drug use: No            Review of Systems:     ROS: See HPI         Physical Exam:     General - The patient is alert and oriented to person and place, answers questions and cooperates with telephone appointment  appropriately.   Voice and Breathing - The patient was talking in full sentences without audible signs of shortness of breath.  There was no audible wheezing, stridor, or stertor.  The patients voice was clear and strong, and had appropriate pitch and quality.           Assessment and Plan:       ICD-10-CM    1. Perennial allergic rhinitis with seasonal variation  J30.89     J30.2    2. History of angioedema  Z87.898    3. Urticaria  L50.9    4. History of acoustic neuroma  Z86.018    5. Sensorineural hearing loss (SNHL) of right ear, unspecified hearing status on contralateral side  H90.5      Long discussion with Rajendra regarding urticaria.  Advised he does have a history of perennial allergic rhinitis and we can allergy test but advised that we only test for 22 different antigens and may not find what he is reacting to. Also discussed this could likely be idiopathic urticaria.  Further referral for allergy clinic may be warranted.  He verbalized understanding and did state that he wants to be tested with our  environmental panel.  I advised start Zyrtec daily to for urticaria, this will have to be stopped 7 days prior to allergy testing.  He is also taking benadryl each night before bed for nasal congestion and sleep.  He will have to stop that also.  He verbalized understanding.      Follow up after testing.      Indications for allergy testing include:    1) Confirm suspicion of allergic rhinitis due to inhalant allergies  2) Identify the offending allergen to determine specific mode of treatment  3) In the case of chronic rhinosinusitis: when symptoms are not controlled by avoidance and pharmacotherapy  4) In the Asthma patient when exacerbations may be due to perennial allergen exposure  5) Suspect food allergy  6) Otitis Media, chronic rhinitis, atopic dermatitis, Meniere disease, headache, pharyngitis or eye symptoms      Modified quantitative testing (MQT) will be performed.  Signed consent was obtained, and the risks of immunotherapy were discussed, including the potential for anaphylaxis.     If immunotherapy (IT) is recommended, there is continued risk of anaphylaxis.   Anaphylaxis can cause death. The patient will need to be monitored for 30 minutes post injection.  They must present their epinephrine pen prior to injection.  Subcutaneous as well as sublingual immunotherapy (SLIT) were discussed as potential treatment options.  The patient was told SLIT is not approved by the FDA and is cash pay.  The general time frame of immunotherapy was discussed (generally 3-5 years, sometimes longer), and the basic immunology behind IT was discussed.      Raeann HOOPER  [facility]    Telephone call length 22 minutes.

## 2020-12-18 NOTE — LETTER
"    12/18/2020         RE: Rajendra Gagnon  [address] [facility] [address]  [facility] [id number]        Dear Colleague,    Thank you for referring your patient, Rajendra Gagnon, to the [facility]. Please see a copy of my visit note below.    Rajendra Gagnon is a 55 year old male who is being evaluated via a billable telephone visit.      The patient has been notified of following:     \"This telephone visit will be conducted via a call between you and your physician/provider. We have found that certain health care needs can be provided without the need for a physical exam.  This service lets us provide the care you need with a short phone conversation.  If a prescription is necessary we can send it directly to your pharmacy.  If lab work is needed we can place an order for that and you can then stop by our lab to have the test done at a later time.    Telephone visits are billed at different rates depending on your insurance coverage. During this emergency period, for some insurers they may be billed the same as an in-person visit.  Please reach out to your insurance provider with any questions.    If during the course of the call the physician/provider feels a telephone visit is not appropriate, you will not be charged for this service.\"    Patient has given verbal consent for Telephone visit?  Yes    What phone number would you like to be contacted at? [phone]    How would you like to obtain your AVS? MyChart    Otolaryngology Note         Chief Complaint:     Patient presents with:  Allergy Consult           History of Present Illness:     Rajendra is a 55 year old male who I am seeing via telephone visit for allergy consultation.      He reports a several year history of hives that he experiences on a yearly basis in the late fall/early winter.  Symptoms start with itchy scalp, palms, feet and eventually turn into hives.  Hives usually start on hands, feet and ankles and move to all over body.  He " was seen in the ED on 12/14 for hives, pictures below are from his ED visit.  A C1 esterase level was ordered and is pending.  He was treated with Prednisone with improvement.  He has an epi pen that he keeps with him.     He has had previous work up in the past with Dr Mota, Dr Lundberg and Dr Boggs for urticaria.  He was allergy tested in April 2009, moderate sensitivities to grass, birch, dust, alternaria, thistle, elm, oak, grain smut and dog,  Low sensitives to ragweed maple, aspergillus, pigweed, kochia, gayle pine.  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  He was treated with 3 months of keflex.   He has been treated with Zyrtec in the past with improvement in hives/angioedema symptoms.  In reviewing past medical records it was suggested that this could be idiopathic urticaria but definitive diagnosis was not made.  He did have a normal C1 inhibitor in April 2008.      Of note he also has a history of acoustic neuroma with surgical removal and no serviceable hearing in the right ear.       Late fall/early winter he has itchy scalp, itchy palms, and feet.  Mild case he takes benadryl. He has had it consistently yearly     He has also had swelling in hands, different parts of body.  He had throat swelling and was seen in the ED.  He has increased heart rate, dizzy, nausea, short of breath and wheezy.     He reports symptoms have been present late fall/early 1 time per year.      He has symptoms that start at home or work.  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      Hives start on hands and ankle, feet, and go all over the body.    He eyelid swelling, lip swelling    He is not currently on allergy medications, he was on it in the past    History of chest surgery for  pectus excavatum  History of PE tubes  No hearing in right ear  He has seasonal allergy symptoms  He has stuffy nose at night  He feels he is more mouth breather  No facial pain or pressure.   He feels more congested in winter - close up    Resides in an older home with a basement. There is questionable water or mold.     Pictures from urticaria in ED                  Medications:     Current Outpatient Rx   Medication Sig Dispense Refill     Acetaminophen (TYLENOL PO) Take 650 mg by mouth       calcium & magnesium carbonates 311-232 MG TABS Take 1 tablet by mouth daily        CALCIUM-MAGNESIUM-ZINC PO Take 1 tablet by mouth every other day       DiphenhydrAMINE HCl (BENADRYL PO) Take 50 mg by mouth        EPINEPHrine (ANY BX GENERIC EQUIV) 0.3 MG/0.3ML injection 2-pack Inject 0.3 mLs (0.3 mg) into the muscle once as needed for anaphylaxis 1 each 3     Multiple Vitamins-Minerals (MULTIVITAMIN OR) Take 1 tablet by mouth daily with food.       predniSONE (DELTASONE) 20 MG tablet 2 tabs day 1-2, then 1 tab days 3-4, then 1/2 tab daily for 6 days 9 tablet 0     vitamin B complex with vitamin C (VITAMIN  B COMPLEX) tablet Take 0.5 tablets by mouth daily       VITAMIN D, CHOLECALCIFEROL, PO Take 2,000 Units by mouth daily        vitamin E 400 units TABS Take 400 Units by mouth daily       metroNIDAZOLE (METROGEL) 0.75 % topical gel ,Apply to redness of the face morning and bedtime (Patient not taking: Reported on 12/18/2020) 45 g 3     Omega-3 Fatty Acids (OMEGA-3 FISH OIL PO) Take 1 g by mouth every other day              Allergies:     Allergies: Aspartame, Venlafaxine, and Zoloft [sertraline]          Past Medical History:     Past Medical History:   Diagnosis Date     Dysthymia 3/26/2012     Hearing loss in right ear 3/14/2012     History of atrial fibrillation 4/23/2012     Hypertriglyceridemia      Right acoustic neuroma 3/14/2012     Tinnitus, unspecified 3/14/2012     Umbilical hernia without obstruction and  without gangrene 5/11/2016     Varicose veins      Varicose veins of both lower extremities 5/12/2017            Past Surgical History:     Past Surgical History:   Procedure Laterality Date     acustic neuroma removed  3/2007    right     BIOPSY PROSTATE TRANSRECTAL N/A 2/5/2019    Procedure: TRANSRECTAL Ultrasound BIOPSY of PROSTATE;  Surgeon: Filemon Betts MD;  Location: [facility] OR     COLONOSCOPY N/A 1/3/2018    Repeat in 2028//Procedure: COLONOSCOPY;  COLONOSCOPY;  Surgeon: Reggie Ron DO;  Location: [facility] OR     funnel chest[      states had chest reconstruction     HERNIA REPAIR       ORTHOPEDIC SURGERY      right knee surgery     PE TUBES         ENT family history reviewed         Social History:     Social History     Tobacco Use     Smoking status: Never Smoker     Smokeless tobacco: Never Used   Substance Use Topics     Alcohol use: Yes     Alcohol/week: 3.3 standard drinks     Types: 4 Cans of beer per week     Comment: rarely     Drug use: No            Review of Systems:     ROS: See HPI         Physical Exam:     General - The patient is alert and oriented to person and place, answers questions and cooperates with telephone appointment  appropriately.   Voice and Breathing - The patient was talking in full sentences without audible signs of shortness of breath.  There was no audible wheezing, stridor, or stertor.  The patients voice was clear and strong, and had appropriate pitch and quality.           Assessment and Plan:       ICD-10-CM    1. Perennial allergic rhinitis with seasonal variation  J30.89     J30.2    2. History of angioedema  Z87.898    3. Urticaria  L50.9    4. History of acoustic neuroma  Z86.018    5. Sensorineural hearing loss (SNHL) of right ear, unspecified hearing status on contralateral side  H90.5      Long discussion with Rajendra regarding urticaria.  Advised he does have a history of perennial allergic rhinitis and we can allergy test but advised that we only test for 22  different antigens and may not find what he is reacting to. Also discussed this could likely be idiopathic urticaria.  Further referral for allergy clinic may be warranted.  He verbalized understanding and did state that he wants to be tested with our environmental panel.  I advised start Zyrtec daily to for urticaria, this will have to be stopped 7 days prior to allergy testing.  He is also taking benadryl each night before bed for nasal congestion and sleep.  He will have to stop that also.  He verbalized understanding.      Follow up after testing.      Indications for allergy testing include:    1) Confirm suspicion of allergic rhinitis due to inhalant allergies  2) Identify the offending allergen to determine specific mode of treatment  3) In the case of chronic rhinosinusitis: when symptoms are not controlled by avoidance and pharmacotherapy  4) In the Asthma patient when exacerbations may be due to perennial allergen exposure  5) Suspect food allergy  6) Otitis Media, chronic rhinitis, atopic dermatitis, Meniere disease, headache, pharyngitis or eye symptoms      Modified quantitative testing (MQT) will be performed.  Signed consent was obtained, and the risks of immunotherapy were discussed, including the potential for anaphylaxis.     If immunotherapy (IT) is recommended, there is continued risk of anaphylaxis.   Anaphylaxis can cause death. The patient will need to be monitored for 30 minutes post injection.  They must present their epinephrine pen prior to injection.  Subcutaneous as well as sublingual immunotherapy (SLIT) were discussed as potential treatment options.  The patient was told SLIT is not approved by the FDA and is cash pay.  The general time frame of immunotherapy was discussed (generally 3-5 years, sometimes longer), and the basic immunology behind IT was discussed.      Raeann HOOPER  [facility]    Telephone call length 22 minutes.       Again, thank you for  allowing me to participate in the care of your patient.        Sincerely,        Raeann Walker NP

## 2020-12-18 NOTE — PATIENT INSTRUCTIONS
Stop benadryl 7 days prior to allergy testing  The allergy nurses will call you to schedule testing      Thank you for allowing Raeann HOOPER and our ENT team to participate in your care.  If your medications are too expensive, please call my nurse at the number listed below.  We can possibly change this medication.    If you have a scheduling or an appointment question please contact our Health Unit Coordinator at their direct line [phone].   ALL nursing questions or concerns can be directed to your ENT nurses at: [phone] or [phone].

## 2020-12-23 NOTE — PROGRESS NOTES
3  SUBJECTIVE:   CC: Rajendra Gagnon is an 55 year old male who presents for preventive health visit.       Patient has been advised of split billing requirements and indicates understanding: Yes  Healthy Habits:    Do you get at least three servings of calcium containing foods daily (dairy, green leafy vegetables, etc.)? yes    Amount of exercise or daily activities, outside of work: no    Problems taking medications regularly No    Medication side effects: No    Have you had an eye exam in the past two years? no    Do you see a dentist twice per year? yes    Do you have sleep apnea, excessive snoring or daytime drowsiness?yes      Hyperlipidemia Follow-Up      Are you regularly taking any medication or supplement to lower your cholesterol?   Yes- fish oil    Are you having muscle aches or other side effects that you think could be caused by your cholesterol lowering medication?  No    Depression and Anxiety Follow-Up    How are you doing with your depression since your last visit? unsure    How are you doing with your anxiety since your last visit?  Worsened with allergic reaction, SOB and hives, has been having visual changes    Are you having other symptoms that might be associated with depression or anxiety? Yes:  hives    Have you had a significant life event? No     Do you have any concerns with your use of alcohol or other drugs? No           Having yearly reactions of hives and some fairly significant reactions sometimes and then this December had another severe episode.  Still has some hive like rashes.     Social History     Tobacco Use     Smoking status: Never Smoker     Smokeless tobacco: Never Used   Substance Use Topics     Alcohol use: Yes     Alcohol/week: 3.3 standard drinks     Types: 4 Cans of beer per week     Comment: rarely     Drug use: No     PHQ 11/14/2017 12/19/2017 1/30/2019   PHQ-9 Total Score 7 6 4   Q9: Thoughts of better off dead/self-harm past 2 weeks Not at all Not at all Not at all      CLAUDETTE-7 SCORE 11/14/2017 12/19/2017 1/30/2019   Total Score 5 5 3     Last PHQ-9 1/6/2021   1.  Little interest or pleasure in doing things 2   2.  Feeling down, depressed, or hopeless 1   3.  Trouble falling or staying asleep, or sleeping too much 1   4.  Feeling tired or having little energy 1   5.  Poor appetite or overeating 1   6.  Feeling bad about yourself 0   7.  Trouble concentrating 0   8.  Moving slowly or restless 0   Q9: Thoughts of better off dead/self-harm past 2 weeks 0   PHQ-9 Total Score 6   Difficulty at work, home, or with people Somewhat difficult     CLAUDETTE-7  1/6/2021   1. Feeling nervous, anxious, or on edge 2   2. Not being able to stop or control worrying 1   3. Worrying too much about different things 1   4. Trouble relaxing 1   5. Being so restless that it is hard to sit still 1   6. Becoming easily annoyed or irritable 0   7. Feeling afraid, as if something awful might happen 2   CLAUDETTE-7 Total Score 8   If you checked any problems, how difficult have they made it for you to do your work, take care of things at home, or get along with other people? Somewhat difficult       Suicide Assessment Five-step Evaluation and Treatment (SAFE-T)      Today's PHQ-2 Score:   PHQ-2 ( 1999 Pf[employer]) 1/29/2019 9/11/2013   Q1: Little interest or pleasure in doing things 0 0   Q2: Feeling down, depressed or hopeless 0 1   PHQ-2 Score 0 1   ALLERGIES:  Duration of complaint: 2017   Description:   Nasal congestion: YES  Sneezing: YES  Red, itchy eyes: YES  Progression of Symptoms:   Worse- getting better   Accompanying Signs & Symptoms:  Cough: no   Wheezing: YES  Rash: YES  Sinus/facial pain: no   History:   Is it seasonal: in the fall and in the winter   History of Asthma: no   Has allergy testing been done: YES- in past and coming future  Precipitating factors: can not find cause and affect   Alleviating factors: steroids / antihist. and has epipen  Therapies Tried and outcome: as above        Abuse: Current  or Past(Physical, Sexual or Emotional)- No  Do you feel safe in your environment? Yes        Social History     Tobacco Use     Smoking status: Never Smoker     Smokeless tobacco: Never Used   Substance Use Topics     Alcohol use: Yes     Alcohol/week: 3.3 standard drinks     Types: 4 Cans of beer per week     Comment: rarely     If you drink alcohol do you typically have >3 drinks per day or >7 drinks per week? No                      Last PSA:   PSA   Date Value Ref Range Status   01/30/2019 6.39 (H) 0 - 4 ug/L Final     Comment:     Assay Method:  Chemiluminescence using Siemens Vista analyzer       Reviewed orders with patient. Reviewed health maintenance and updated orders accordingly - Yes  Labs reviewed in EPIC    Reviewed and updated as needed this visit by clinical staff                 Reviewed and updated as needed this visit by Provider                Past Medical History:   Diagnosis Date     Dysthymia 3/26/2012     Hearing loss in right ear 3/14/2012     History of atrial fibrillation 4/23/2012     Hypertriglyceridemia      Right acoustic neuroma 3/14/2012     Tinnitus, unspecified 3/14/2012     Umbilical hernia without obstruction and without gangrene 5/11/2016     Varicose veins      Varicose veins of both lower extremities 5/12/2017      Past Surgical History:   Procedure Laterality Date     acustic neuroma removed  3/2007    right     BIOPSY PROSTATE TRANSRECTAL N/A 2/5/2019    Procedure: TRANSRECTAL Ultrasound BIOPSY of PROSTATE;  Surgeon: Filemon Betts MD;  Location: [facility] OR     COLONOSCOPY N/A 1/3/2018    Repeat in 2028//Procedure: COLONOSCOPY;  COLONOSCOPY;  Surgeon: Reggie Ron DO;  Location: [facility] OR     funnel chest[      states had chest reconstruction     HERNIA REPAIR       ORTHOPEDIC SURGERY      right knee surgery     PE TUBES         ROS:  CONSTITUTIONAL: NEGATIVE for fever, chills, change in weight  INTEGUMENTARY/SKIN: NEGATIVE for worrisome rashes, moles or lesions  EYES:  "NEGATIVE for vision changes or irritation  ENT: NEGATIVE for ear, mouth and throat problems  RESP: NEGATIVE for significant cough or SOB  CV: NEGATIVE for chest pain, palpitations or peripheral edema  GI: NEGATIVE for nausea, abdominal pain, heartburn, or change in bowel habits   male: negative for dysuria, hematuria, decreased urinary stream, erectile dysfunction, urethral discharge  MUSCULOSKELETAL: NEGATIVE for significant arthralgias or myalgia  NEURO: NEGATIVE for weakness, dizziness or paresthesias  PSYCHIATRIC: NEGATIVE for changes in mood or affect    OBJECTIVE:   BP (!) 162/86   Pulse 88   Temp 98.1  F (36.7  C)   Ht 1.861 m (6' 1.25\")   Wt 121.6 kg (268 lb)   SpO2 99%   BMI 35.12 kg/m    EXAM:  GENERAL: healthy, alert and no distress  EYES: Eyes grossly normal to inspection, PERRL and conjunctivae and sclerae normal  HENT: ear canals and TM's normal, nose and mouth without ulcers or lesions  NECK: no adenopathy, no asymmetry, masses, or scars and thyroid normal to palpation  RESP: lungs clear to auscultation - no rales, rhonchi or wheezes  CV: regular rate and rhythm, normal S1 S2, no S3 or S4, no murmur, click or rub, no peripheral edema and peripheral pulses strong  ABDOMEN: soft, nontender, no hepatosplenomegaly, no masses and bowel sounds normal   (male): normal male genitalia without lesions or urethral discharge, no hernia  MS: no gross musculoskeletal defects noted, no edema  SKIN: no suspicious lesions or rashes except diffuse urticarial rash on trunk and arms mainly - improved from pics  NEURO: Normal strength and tone, mentation intact and speech normal  PSYCH: mentation appears normal, affect normal/bright  LYMPH: no cervical, supraclavicular, axillary, or inguinal adenopathy    Results for orders placed or performed in visit on 01/06/21   PSA tumor marker     Status: None   Result Value Ref Range    PSA <0.01 0 - 4 ug/L   Lipid Profile     Status: Abnormal   Result Value Ref Range    " Cholesterol 198 <200 mg/dL    Triglycerides 153 (H) <150 mg/dL    HDL Cholesterol 38 (L) >39 mg/dL    LDL Cholesterol Calculated 129 (H) <100 mg/dL    Non HDL Cholesterol 160 (H) <130 mg/dL   Comprehensive metabolic panel     Status: None   Result Value Ref Range    Sodium 141 133 - 144 mmol/L    Potassium 3.9 3.4 - 5.3 mmol/L    Chloride 107 94 - 109 mmol/L    Carbon Dioxide 30 20 - 32 mmol/L    Anion Gap 4 3 - 14 mmol/L    Glucose 89 70 - 99 mg/dL    Urea Nitrogen 14 7 - 30 mg/dL    Creatinine 1.08 0.66 - 1.25 mg/dL    GFR Estimate 78 >60 mL/min/[1.73_m2]    GFR Estimate If Black 90 >60 mL/min/[1.73_m2]    Calcium 8.5 8.5 - 10.1 mg/dL    Bilirubin Total 0.7 0.2 - 1.3 mg/dL    Albumin 4.1 3.4 - 5.0 g/dL    Protein Total 7.4 6.8 - 8.8 g/dL    Alkaline Phosphatase 74 40 - 150 U/L    ALT 32 0 - 70 U/L    AST 23 0 - 45 U/L   CBC with platelets differential     Status: None   Result Value Ref Range    WBC 6.3 4.0 - 11.0 10e9/L    RBC Count 5.15 4.4 - 5.9 10e12/L    Hemoglobin 15.7 13.3 - 17.7 g/dL    Hematocrit 45.2 40.0 - 53.0 %    MCV 88 78 - 100 fl    MCH 30.5 26.5 - 33.0 pg    MCHC 34.7 31.5 - 36.5 g/dL    RDW 12.7 10.0 - 15.0 %    Platelet Count 172 150 - 450 10e9/L    Diff Method Automated Method     % Neutrophils 54.5 %    % Lymphocytes 31.8 %    % Monocytes 9.1 %    % Eosinophils 3.8 %    % Basophils 0.3 %    % Immature Granulocytes 0.5 %    Nucleated RBCs 0 0 /100    Absolute Neutrophil 3.4 1.6 - 8.3 10e9/L    Absolute Lymphocytes 2.0 0.8 - 5.3 10e9/L    Absolute Monocytes 0.6 0.0 - 1.3 10e9/L    Absolute Eosinophils 0.2 0.0 - 0.7 10e9/L    Absolute Basophils 0.0 0.0 - 0.2 10e9/L    Abs Immature Granulocytes 0.0 0 - 0.4 10e9/L    Absolute Nucleated RBC 0.0          ASSESSMENT/PLAN:   1. Routine general medical examination at a health care facility  See below- needs flu shot and wants one but on steroids. Pt to get after steroids    2. Hypertriglyceridemia  Mild . Continue current medications and behavioral  "changes.   - Comprehensive metabolic panel; Future  - Lipid Profile; Future    3. Prostate cancer (H)  S/p prostate cancer- PSA ok - no s/s of reoccurance   - CBC with platelets differential; Future  - Comprehensive metabolic panel; Future  - PSA tumor marker; Future    4. Right acoustic neuroma  In remote past/ s/p surgery - no s/s of reoccurance     5. Urticaria  Etiology unclear- long discussion ,  Needs to do a diary and see if pattern. Pt has Allergy testing coming up - may??? Help. Medrol dose pack given and discussed claritin or zyrtec otc 10mg BID. Pt is aware how to use epipen .   - methylPREDNISolone (MEDROL DOSEPAK) 4 MG tablet therapy pack; Follow Package Directions  Dispense: 21 tablet; Refill: 0    6. Morbid obesity (H)  Needs to keep working on wt loss.       BP up some but on steroids and still dealing with allergic reaction. Will watch. BP in past usually normal      Patient has been advised of split billing requirements and indicates understanding:   COUNSELING:  Reviewed preventive health counseling, as reflected in patient instructions       Regular exercise       Healthy diet/nutrition    Estimated body mass index is 35.9 kg/m  as calculated from the following:    Height as of 2/5/19: 1.88 m (6' 2\").    Weight as of 2/28/19: 126.8 kg (279 lb 9.6 oz).    Weight management plan: Discussed healthy diet and exercise guidelines    He reports that he has never smoked. He has never used smokeless tobacco.      Counseling Resources:  ATP IV Guidelines  Pooled Cohorts Equation Calculator  FRAX Risk Assessment  ICSI Preventive Guidelines  Dietary Guidelines for Americans, 2010  USDA's MyPlate  ASA Prophylaxis  Lung CA Screening    Bhupinder Mckeon MD  [facility] - [place]  "

## 2021-01-06 ENCOUNTER — OFFICE VISIT (OUTPATIENT)
Dept: FAMILY MEDICINE | Facility: OTHER | Age: 56
End: 2021-01-06
Attending: FAMILY MEDICINE
Payer: COMMERCIAL

## 2021-01-06 VITALS
SYSTOLIC BLOOD PRESSURE: 162 MMHG | HEIGHT: 73 IN | HEART RATE: 88 BPM | DIASTOLIC BLOOD PRESSURE: 86 MMHG | BODY MASS INDEX: 35.52 KG/M2 | WEIGHT: 268 LBS | OXYGEN SATURATION: 99 % | TEMPERATURE: 98.1 F

## 2021-01-06 DIAGNOSIS — L50.9 URTICARIA: ICD-10-CM

## 2021-01-06 DIAGNOSIS — C61 PROSTATE CANCER (H): ICD-10-CM

## 2021-01-06 DIAGNOSIS — Z00.00 ROUTINE GENERAL MEDICAL EXAMINATION AT A HEALTH CARE FACILITY: Primary | ICD-10-CM

## 2021-01-06 DIAGNOSIS — E66.01 MORBID OBESITY (H): ICD-10-CM

## 2021-01-06 DIAGNOSIS — D33.3 BENIGN NEOPLASM OF CRANIAL NERVES (H): ICD-10-CM

## 2021-01-06 DIAGNOSIS — E78.1 HYPERTRIGLYCERIDEMIA: ICD-10-CM

## 2021-01-06 LAB
ALBUMIN SERPL-MCNC: 4.1 G/DL (ref 3.4–5)
ALP SERPL-CCNC: 74 U/L (ref 40–150)
ALT SERPL W P-5'-P-CCNC: 32 U/L (ref 0–70)
ANION GAP SERPL CALCULATED.3IONS-SCNC: 4 MMOL/L (ref 3–14)
AST SERPL W P-5'-P-CCNC: 23 U/L (ref 0–45)
BASOPHILS # BLD AUTO: 0 10E9/L (ref 0–0.2)
BASOPHILS NFR BLD AUTO: 0.3 %
BILIRUB SERPL-MCNC: 0.7 MG/DL (ref 0.2–1.3)
BUN SERPL-MCNC: 14 MG/DL (ref 7–30)
CALCIUM SERPL-MCNC: 8.5 MG/DL (ref 8.5–10.1)
CHLORIDE SERPL-SCNC: 107 MMOL/L (ref 94–109)
CHOLEST SERPL-MCNC: 198 MG/DL
CO2 SERPL-SCNC: 30 MMOL/L (ref 20–32)
CREAT SERPL-MCNC: 1.08 MG/DL (ref 0.66–1.25)
DIFFERENTIAL METHOD BLD: NORMAL
EOSINOPHIL # BLD AUTO: 0.2 10E9/L (ref 0–0.7)
EOSINOPHIL NFR BLD AUTO: 3.8 %
ERYTHROCYTE [DISTWIDTH] IN BLOOD BY AUTOMATED COUNT: 12.7 % (ref 10–15)
GFR SERPL CREATININE-BSD FRML MDRD: 78 ML/MIN/{1.73_M2}
GLUCOSE SERPL-MCNC: 89 MG/DL (ref 70–99)
HCT VFR BLD AUTO: 45.2 % (ref 40–53)
HDLC SERPL-MCNC: 38 MG/DL
HGB BLD-MCNC: 15.7 G/DL (ref 13.3–17.7)
IMM GRANULOCYTES # BLD: 0 10E9/L (ref 0–0.4)
IMM GRANULOCYTES NFR BLD: 0.5 %
LDLC SERPL CALC-MCNC: 129 MG/DL
LYMPHOCYTES # BLD AUTO: 2 10E9/L (ref 0.8–5.3)
LYMPHOCYTES NFR BLD AUTO: 31.8 %
MCH RBC QN AUTO: 30.5 PG (ref 26.5–33)
MCHC RBC AUTO-ENTMCNC: 34.7 G/DL (ref 31.5–36.5)
MCV RBC AUTO: 88 FL (ref 78–100)
MONOCYTES # BLD AUTO: 0.6 10E9/L (ref 0–1.3)
MONOCYTES NFR BLD AUTO: 9.1 %
NEUTROPHILS # BLD AUTO: 3.4 10E9/L (ref 1.6–8.3)
NEUTROPHILS NFR BLD AUTO: 54.5 %
NONHDLC SERPL-MCNC: 160 MG/DL
NRBC # BLD AUTO: 0 10*3/UL
NRBC BLD AUTO-RTO: 0 /100
PLATELET # BLD AUTO: 172 10E9/L (ref 150–450)
POTASSIUM SERPL-SCNC: 3.9 MMOL/L (ref 3.4–5.3)
PROT SERPL-MCNC: 7.4 G/DL (ref 6.8–8.8)
PSA SERPL-MCNC: <0.01 UG/L (ref 0–4)
RBC # BLD AUTO: 5.15 10E12/L (ref 4.4–5.9)
SODIUM SERPL-SCNC: 141 MMOL/L (ref 133–144)
TRIGL SERPL-MCNC: 153 MG/DL
WBC # BLD AUTO: 6.3 10E9/L (ref 4–11)

## 2021-01-06 PROCEDURE — 99396 PREV VISIT EST AGE 40-64: CPT | Performed by: FAMILY MEDICINE

## 2021-01-06 PROCEDURE — 80053 COMPREHEN METABOLIC PANEL: CPT | Performed by: FAMILY MEDICINE

## 2021-01-06 PROCEDURE — 85025 COMPLETE CBC W/AUTO DIFF WBC: CPT | Performed by: FAMILY MEDICINE

## 2021-01-06 PROCEDURE — 36415 COLL VENOUS BLD VENIPUNCTURE: CPT | Performed by: FAMILY MEDICINE

## 2021-01-06 PROCEDURE — 80061 LIPID PANEL: CPT | Performed by: FAMILY MEDICINE

## 2021-01-06 PROCEDURE — 84153 ASSAY OF PSA TOTAL: CPT | Performed by: FAMILY MEDICINE

## 2021-01-06 RX ORDER — PREDNISONE 10 MG/1
10 TABLET ORAL DAILY
COMMUNITY
End: 2021-01-06

## 2021-01-06 RX ORDER — METHYLPREDNISOLONE 4 MG
TABLET, DOSE PACK ORAL
Qty: 21 TABLET | Refills: 0 | Status: SHIPPED | OUTPATIENT
Start: 2021-01-06 | End: 2021-05-11

## 2021-01-06 ASSESSMENT — PAIN SCALES - GENERAL: PAINLEVEL: NO PAIN (0)

## 2021-01-06 ASSESSMENT — ANXIETY QUESTIONNAIRES
6. BECOMING EASILY ANNOYED OR IRRITABLE: NOT AT ALL
1. FEELING NERVOUS, ANXIOUS, OR ON EDGE: MORE THAN HALF THE DAYS
4. TROUBLE RELAXING: SEVERAL DAYS
2. NOT BEING ABLE TO STOP OR CONTROL WORRYING: SEVERAL DAYS
7. FEELING AFRAID AS IF SOMETHING AWFUL MIGHT HAPPEN: MORE THAN HALF THE DAYS
IF YOU CHECKED OFF ANY PROBLEMS ON THIS QUESTIONNAIRE, HOW DIFFICULT HAVE THESE PROBLEMS MADE IT FOR YOU TO DO YOUR WORK, TAKE CARE OF THINGS AT HOME, OR GET ALONG WITH OTHER PEOPLE: SOMEWHAT DIFFICULT
3. WORRYING TOO MUCH ABOUT DIFFERENT THINGS: SEVERAL DAYS
GAD7 TOTAL SCORE: 8
5. BEING SO RESTLESS THAT IT IS HARD TO SIT STILL: SEVERAL DAYS

## 2021-01-06 ASSESSMENT — MIFFLIN-ST. JEOR: SCORE: 2118.48

## 2021-01-06 ASSESSMENT — PATIENT HEALTH QUESTIONNAIRE - PHQ9: SUM OF ALL RESPONSES TO PHQ QUESTIONS 1-9: 6

## 2021-01-06 NOTE — NURSING NOTE
"Chief Complaint   Patient presents with     Physical       Initial BP (!) 162/86   Pulse 88   Temp 98.1  F (36.7  C)   Ht 1.861 m (6' 1.25\")   Wt 121.6 kg (268 lb)   SpO2 99%   BMI 35.12 kg/m   Estimated body mass index is 35.12 kg/m  as calculated from the following:    Height as of this encounter: 1.861 m (6' 1.25\").    Weight as of this encounter: 121.6 kg (268 lb).  Medication Reconciliation: complete  Juan A Vitale LPN  "

## 2021-01-07 ASSESSMENT — ANXIETY QUESTIONNAIRES: GAD7 TOTAL SCORE: 8

## 2021-01-11 LAB — LAB SCANNED RESULT: ABNORMAL

## 2021-01-18 ENCOUNTER — NURSE TRIAGE (OUTPATIENT)
Dept: FAMILY MEDICINE | Facility: OTHER | Age: 56
End: 2021-01-18

## 2021-01-18 ENCOUNTER — OFFICE VISIT (OUTPATIENT)
Dept: FAMILY MEDICINE | Facility: OTHER | Age: 56
End: 2021-01-18
Attending: FAMILY MEDICINE
Payer: COMMERCIAL

## 2021-01-18 DIAGNOSIS — Z20.822 SUSPECTED COVID-19 VIRUS INFECTION: Primary | ICD-10-CM

## 2021-01-18 DIAGNOSIS — L50.9 HIVES: ICD-10-CM

## 2021-01-18 DIAGNOSIS — R07.9 CHEST PAIN, UNSPECIFIED: ICD-10-CM

## 2021-01-18 PROCEDURE — U0003 INFECTIOUS AGENT DETECTION BY NUCLEIC ACID (DNA OR RNA); SEVERE ACUTE RESPIRATORY SYNDROME CORONAVIRUS 2 (SARS-COV-2) (CORONAVIRUS DISEASE [COVID-19]), AMPLIFIED PROBE TECHNIQUE, MAKING USE OF HIGH THROUGHPUT TECHNOLOGIES AS DESCRIBED BY CMS-2020-01-R: HCPCS | Performed by: FAMILY MEDICINE

## 2021-01-18 PROCEDURE — U0005 INFEC AGEN DETEC AMPLI PROBE: HCPCS | Performed by: FAMILY MEDICINE

## 2021-01-18 NOTE — TELEPHONE ENCOUNTER
"    Reason for Disposition    [1] COVID-19 infection suspected by caller or triager AND [2] mild symptoms (cough, fever, or others) AND [3] no complications or SOB    Answer Assessment - Initial Assessment Questions  1. COVID-19 DIAGNOSIS: \"Who made your Coronavirus (COVID-19) diagnosis?\" \"Was it confirmed by a positive lab test?\" If not diagnosed by a HCP, ask \"Are there lots of cases (community spread) where you live?\" (See public health department website, if unsure)      no  2. COVID-19 EXPOSURE: \"Was there any known exposure to COVID before the symptoms began?\" CDC Definition of close contact: within 6 feet (2 meters) for a total of 15 minutes or more over a 24-hour period.       no  3. ONSET: \"When did the COVID-19 symptoms start?\"       Friday  4. WORST SYMPTOM: \"What is your worst symptom?\" (e.g., cough, fever, shortness of breath, muscle aches)      Cough   5. COUGH: \"Do you have a cough?\" If so, ask: \"How bad is the cough?\"        cough  6. FEVER: \"Do you have a fever?\" If so, ask: \"What is your temperature, how was it measured, and when did it start?\"      99.7  7. RESPIRATORY STATUS: \"Describe your breathing?\" (e.g., shortness of breath, wheezing, unable to speak)       wheezing  8. BETTER-SAME-WORSE: \"Are you getting better, staying the same or getting worse compared to yesterday?\"  If getting worse, ask, \"In what way?\"      same  9. HIGH RISK DISEASE: \"Do you have any chronic medical problems?\" (e.g., asthma, heart or lung disease, weak immune system, obesity, etc.)      no  10. PREGNANCY: \"Is there any chance you are pregnant?\" \"When was your last menstrual period?\"        no  11. OTHER SYMPTOMS: \"Do you have any other symptoms?\"  (e.g., chills, fatigue, headache, loss of smell or taste, muscle pain, sore throat; new loss of smell or taste especially support the diagnosis of COVID-19)        Body ache, fatigue headache sore throat    Protocols used: CORONAVIRUS (COVID-19) DIAGNOSED OR [id number] " 12.1

## 2021-01-20 LAB
SARS-COV-2 RNA RESP QL NAA+PROBE: NOT DETECTED
SPECIMEN SOURCE: NORMAL

## 2021-05-06 ENCOUNTER — TELEPHONE (OUTPATIENT)
Dept: OTOLARYNGOLOGY | Facility: OTHER | Age: 56
End: 2021-05-06

## 2021-05-07 ENCOUNTER — HOSPITAL ENCOUNTER (EMERGENCY)
Facility: HOSPITAL | Age: 56
Discharge: HOME | End: 2021-05-07
Attending: PHYSICIAN ASSISTANT | Admitting: PHYSICIAN ASSISTANT
Payer: COMMERCIAL

## 2021-05-07 VITALS
OXYGEN SATURATION: 98 % | HEART RATE: 80 BPM | BODY MASS INDEX: 34.65 KG/M2 | RESPIRATION RATE: 18 BRPM | DIASTOLIC BLOOD PRESSURE: 82 MMHG | TEMPERATURE: 98 F | SYSTOLIC BLOOD PRESSURE: 154 MMHG | WEIGHT: 270 LBS | HEIGHT: 74 IN

## 2021-05-07 DIAGNOSIS — R53.83 FATIGUE: ICD-10-CM

## 2021-05-07 DIAGNOSIS — R42 DIZZINESS: ICD-10-CM

## 2021-05-07 LAB
ANION GAP SERPL CALCULATED.3IONS-SCNC: 4 MMOL/L (ref 3–14)
BASOPHILS # BLD AUTO: 0 10E9/L (ref 0–0.2)
BASOPHILS NFR BLD AUTO: 0.5 %
BUN SERPL-MCNC: 16 MG/DL (ref 7–30)
CALCIUM SERPL-MCNC: 8.9 MG/DL (ref 8.5–10.1)
CHLORIDE SERPL-SCNC: 108 MMOL/L (ref 94–109)
CO2 SERPL-SCNC: 27 MMOL/L (ref 20–32)
CREAT SERPL-MCNC: 0.98 MG/DL (ref 0.66–1.25)
DIFFERENTIAL METHOD BLD: NORMAL
EOSINOPHIL # BLD AUTO: 0.1 10E9/L (ref 0–0.7)
EOSINOPHIL NFR BLD AUTO: 0.8 %
ERYTHROCYTE [DISTWIDTH] IN BLOOD BY AUTOMATED COUNT: 12.8 % (ref 10–15)
GFR SERPL CREATININE-BSD FRML MDRD: 87 ML/MIN/{1.73_M2}
GLUCOSE SERPL-MCNC: 103 MG/DL (ref 70–99)
HCT VFR BLD AUTO: 43.7 % (ref 40–53)
HGB BLD-MCNC: 15.3 G/DL (ref 13.3–17.7)
IMM GRANULOCYTES # BLD: 0 10E9/L (ref 0–0.4)
IMM GRANULOCYTES NFR BLD: 0.5 %
LYMPHOCYTES # BLD AUTO: 0.9 10E9/L (ref 0.8–5.3)
LYMPHOCYTES NFR BLD AUTO: 15.2 %
MCH RBC QN AUTO: 30.4 PG (ref 26.5–33)
MCHC RBC AUTO-ENTMCNC: 35 G/DL (ref 31.5–36.5)
MCV RBC AUTO: 87 FL (ref 78–100)
MONOCYTES # BLD AUTO: 0.4 10E9/L (ref 0–1.3)
MONOCYTES NFR BLD AUTO: 6.7 %
NEUTROPHILS # BLD AUTO: 4.7 10E9/L (ref 1.6–8.3)
NEUTROPHILS NFR BLD AUTO: 76.3 %
NRBC # BLD AUTO: 0 10*3/UL
NRBC BLD AUTO-RTO: 0 /100
PLATELET # BLD AUTO: 182 10E9/L (ref 150–450)
POTASSIUM SERPL-SCNC: 3.8 MMOL/L (ref 3.4–5.3)
RBC # BLD AUTO: 5.03 10E12/L (ref 4.4–5.9)
SODIUM SERPL-SCNC: 139 MMOL/L (ref 133–144)
TROPONIN I SERPL-MCNC: <0.015 UG/L (ref 0–0.04)
TSH SERPL DL<=0.005 MIU/L-ACNC: 1.6 MU/L (ref 0.4–4)
WBC # BLD AUTO: 6.1 10E9/L (ref 4–11)

## 2021-05-07 PROCEDURE — 99284 EMERGENCY DEPT VISIT MOD MDM: CPT | Performed by: PHYSICIAN ASSISTANT

## 2021-05-07 PROCEDURE — 36415 COLL VENOUS BLD VENIPUNCTURE: CPT | Performed by: PHYSICIAN ASSISTANT

## 2021-05-07 PROCEDURE — 84443 ASSAY THYROID STIM HORMONE: CPT | Performed by: PHYSICIAN ASSISTANT

## 2021-05-07 PROCEDURE — 85025 COMPLETE CBC W/AUTO DIFF WBC: CPT | Performed by: PHYSICIAN ASSISTANT

## 2021-05-07 PROCEDURE — 93005 ELECTROCARDIOGRAM TRACING: CPT

## 2021-05-07 PROCEDURE — 80048 BASIC METABOLIC PNL TOTAL CA: CPT | Performed by: PHYSICIAN ASSISTANT

## 2021-05-07 PROCEDURE — 93010 ELECTROCARDIOGRAM REPORT: CPT | Performed by: INTERNAL MEDICINE

## 2021-05-07 PROCEDURE — 84484 ASSAY OF TROPONIN QUANT: CPT | Performed by: PHYSICIAN ASSISTANT

## 2021-05-07 PROCEDURE — 99284 EMERGENCY DEPT VISIT MOD MDM: CPT

## 2021-05-07 ASSESSMENT — ENCOUNTER SYMPTOMS
RESPIRATORY NEGATIVE: 1
PALPITATIONS: 1
FATIGUE: 1
LIGHT-HEADEDNESS: 1
EYES NEGATIVE: 1
DIAPHORESIS: 0
NERVOUS/ANXIOUS: 1
GASTROINTESTINAL NEGATIVE: 1
MUSCULOSKELETAL NEGATIVE: 1
CHILLS: 0
FEVER: 0

## 2021-05-07 ASSESSMENT — MIFFLIN-ST. JEOR: SCORE: 2134.46

## 2021-05-07 NOTE — ED TRIAGE NOTES
Pt presents with complaints of dizziness and hypertension while at work today while walking. Pt reports bp 178/117. Reports some intermittent chest discomfort, unsure of sob. Denies HA, vision disturbance.

## 2021-05-07 NOTE — ED PROVIDER NOTES
History     Chief Complaint   Patient presents with     Dizziness     Hypertension     HPI  Rajendra Gagnon is a 56 year old male who presents emergency department with complaints of dizziness, fatigue that started on 845 this morning.  Patient states that he was walking around doing his normal job and had sudden onset of anxiety and symptoms described above.  He denies chest pain, shortness of breath.  He had no loss of consciousness.  Patient denies fever, upper respiratory symptoms, abdominal symptoms.    He reports prior history of atrial fibrillation which resolved spontaneously.  No other significant heart history.  No history of diabetes.  Patient does not smoke.    Patient describes a number of stressful factors at home including a daughter who needs kidney transplant, recently having to put his dog down.  Patient does have a history of adjustment disorder with mixed anxiety and depressed mood but is not currently taking medications for this.        Allergies:  Allergies   Allergen Reactions     Aspartame Nausea     Venlafaxine Other (See Comments)     Night terrors     Zoloft [Sertraline] Other (See Comments)     Head rush, hives and agitation       Problem List:    Patient Active Problem List    Diagnosis Date Noted     Morbid obesity (H) 01/06/2021     Priority: Medium     Urticaria 01/06/2021     Priority: Medium     Prostate cancer (H) 02/14/2019     Priority: Medium     Adjustment disorder with mixed anxiety and depressed mood 11/14/2017     Priority: Medium     Varicose veins of both lower extremities 05/12/2017     Priority: Medium     ACP (advance care planning) 05/11/2016     Priority: Medium     Advance Care Planning 5/11/2016: ACP Review of Chart / Resources Provided:  Reviewed chart for advance care plan.  Rajendra Gagnon has no plan or code status on file. Discussed available resources and provided with information.   Added by Juan A Vitale             Umbilical hernia without obstruction and  without gangrene 05/11/2016     Priority: Medium     Hypertriglyceridemia      Priority: Medium     History of atrial fibrillation 04/23/2012     Priority: Medium     Dysthymia 03/26/2012     Priority: Medium     Benign neoplasm of cranial nerve (H) 03/14/2012     Priority: Medium     Problem list name updated by automated process. Provider to review       Right acoustic neuroma 03/14/2012     Priority: Medium        Past Medical History:    Past Medical History:   Diagnosis Date     Dysthymia 3/26/2012     Hearing loss in right ear 3/14/2012     History of atrial fibrillation 4/23/2012     Hypertriglyceridemia      Right acoustic neuroma 3/14/2012     Tinnitus, unspecified 3/14/2012     Umbilical hernia without obstruction and without gangrene 5/11/2016     Varicose veins      Varicose veins of both lower extremities 5/12/2017       Past Surgical History:    Past Surgical History:   Procedure Laterality Date     acustic neuroma removed  3/2007    right     BIOPSY PROSTATE TRANSRECTAL N/A 2/5/2019    Procedure: TRANSRECTAL Ultrasound BIOPSY of PROSTATE;  Surgeon: Filemon Betts MD;  Location: [facility] OR     COLONOSCOPY N/A 1/3/2018    Repeat in 2028//Procedure: COLONOSCOPY;  COLONOSCOPY;  Surgeon: Reggie Ron DO;  Location: [facility] OR     funnel chest[      states had chest reconstruction     HERNIA REPAIR       ORTHOPEDIC SURGERY      right knee surgery     PE TUBES         Family History:    Family History   Problem Relation Age of Onset     Diabetes Mother         type 2     Hypertension Mother      Heart Disease Mother      Lipids Mother      Alcohol/Drug Father      Cancer Maternal Grandmother      Eye Disorder Maternal Grandfather      Heart Disease Maternal Grandfather      Cancer Maternal Grandfather      Cancer Paternal Grandmother      Prostate Cancer Paternal Grandfather      Other - See Comments Daughter         lupus     Kidney Disease Daughter      Kidney Disease Daughter        Social History:  Marital  "Status:   [2]  Social History     Tobacco Use     Smoking status: Never Smoker     Smokeless tobacco: Never Used   Substance Use Topics     Alcohol use: Yes     Alcohol/week: 3.3 standard drinks     Types: 4 Cans of beer per week     Comment: rarely     Drug use: No        Medications:    Acetaminophen (TYLENOL PO)  calcium & magnesium carbonates 311-232 MG TABS  DiphenhydrAMINE HCl (BENADRYL PO)  EPINEPHrine (ANY BX GENERIC EQUIV) 0.3 MG/0.3ML injection 2-pack  methylPREDNISolone (MEDROL DOSEPAK) 4 MG tablet therapy pack  metroNIDAZOLE (METROGEL) 0.75 % topical gel  Multiple Vitamins-Minerals (MULTIVITAMIN OR)  Omega-3 Fatty Acids (OMEGA-3 FISH OIL PO)  vitamin B complex with vitamin C (VITAMIN  B COMPLEX) tablet  VITAMIN D, CHOLECALCIFEROL, PO  vitamin E 400 units TABS          Review of Systems   Constitutional: Positive for fatigue. Negative for chills, diaphoresis and fever.   HENT: Negative.    Eyes: Negative.    Respiratory: Negative.    Cardiovascular: Positive for palpitations.   Gastrointestinal: Negative.    Musculoskeletal: Negative.    Skin: Negative.    Neurological: Positive for light-headedness. Negative for syncope.   Psychiatric/Behavioral: The patient is nervous/anxious.        Physical Exam   BP: 155/96  Pulse: 91  Temp: 98.5  F (36.9  C)  Resp: 18  Height: 188 cm (6' 2\")  Weight: 122.5 kg (270 lb)  SpO2: 99 %  Lying Orthostatic BP: 155/90  Lying Orthostatic Pulse: 78 bpm  Sitting Orthostatic BP: 166/97  Sitting Orthostatic Pulse: 77 bpm  Standing Orthostatic BP: 140/97  Standing Orthostatic Pulse: 90 bpm      Physical Exam  Constitutional:       General: He is not in acute distress.     Appearance: He is obese. He is not diaphoretic.   HENT:      Head: Atraumatic.   Eyes:      General: No scleral icterus.     Extraocular Movements: Extraocular movements intact.      Conjunctiva/sclera: Conjunctivae normal.      Pupils: Pupils are equal, round, and reactive to light.   Neck:      " Musculoskeletal: Normal range of motion.   Cardiovascular:      Rate and Rhythm: Normal rate and regular rhythm.      Heart sounds: Normal heart sounds.   Pulmonary:      Effort: Pulmonary effort is normal. No respiratory distress.      Breath sounds: Normal breath sounds.   Abdominal:      General: Bowel sounds are normal.      Palpations: Abdomen is soft.      Tenderness: There is no abdominal tenderness.   Musculoskeletal: Normal range of motion.         General: No tenderness.   Skin:     General: Skin is warm.      Findings: No rash.   Neurological:      General: No focal deficit present.      Mental Status: He is alert and oriented to person, place, and time.         ED Course        Procedures               EKG Interpretation:      Interpreted by ALISTAIR Mcarthur  Time reviewed: 1110  Symptoms at time of EKG: palpitations, discomfort   Rhythm: normal sinus   Rate: 79 bpm  Axis: normal  Ectopy: none  Conduction: normal  ST Segments/ T Waves: No ST-T wave changes  Q Waves: none  Comparison to prior: Unchanged from 12/9/20    Clinical Impression: normal EKG           Results for orders placed or performed during the hospital encounter of 05/07/21 (from the past 24 hour(s))   Basic metabolic panel   Result Value Ref Range    Sodium 139 133 - 144 mmol/L    Potassium 3.8 3.4 - 5.3 mmol/L    Chloride 108 94 - 109 mmol/L    Carbon Dioxide 27 20 - 32 mmol/L    Anion Gap 4 3 - 14 mmol/L    Glucose 103 (H) 70 - 99 mg/dL    Urea Nitrogen 16 7 - 30 mg/dL    Creatinine 0.98 0.66 - 1.25 mg/dL    GFR Estimate 87 >60 mL/min/[1.73_m2]    GFR Estimate If Black >90 >60 mL/min/[1.73_m2]    Calcium 8.9 8.5 - 10.1 mg/dL   CBC with platelets differential   Result Value Ref Range    WBC 6.1 4.0 - 11.0 10e9/L    RBC Count 5.03 4.4 - 5.9 10e12/L    Hemoglobin 15.3 13.3 - 17.7 g/dL    Hematocrit 43.7 40.0 - 53.0 %    MCV 87 78 - 100 fl    MCH 30.4 26.5 - 33.0 pg    MCHC 35.0 31.5 - 36.5 g/dL    RDW 12.8 10.0 - 15.0 %    Platelet  Count 182 150 - 450 10e9/L    Diff Method Automated Method     % Neutrophils 76.3 %    % Lymphocytes 15.2 %    % Monocytes 6.7 %    % Eosinophils 0.8 %    % Basophils 0.5 %    % Immature Granulocytes 0.5 %    Nucleated RBCs 0 0 /100    Absolute Neutrophil 4.7 1.6 - 8.3 10e9/L    Absolute Lymphocytes 0.9 0.8 - 5.3 10e9/L    Absolute Monocytes 0.4 0.0 - 1.3 10e9/L    Absolute Eosinophils 0.1 0.0 - 0.7 10e9/L    Absolute Basophils 0.0 0.0 - 0.2 10e9/L    Abs Immature Granulocytes 0.0 0 - 0.4 10e9/L    Absolute Nucleated RBC 0.0    Troponin I   Result Value Ref Range    Troponin I ES <0.015 0.000 - 0.045 ug/L   TSH with free T4 reflex   Result Value Ref Range    TSH 1.60 0.40 - 4.00 mU/L       Medications - No data to display    Assessments & Plan (with Medical Decision Making)     I have reviewed the nursing notes.    I have reviewed the findings, diagnosis, plan and need for follow up with the patient.  EKG and initial troponin unremarkable.  No pain to suggest ACS.  Discussed recent stressors that may have triggered an anxiety reaction.  Recommended observation and return emergency department if new onset chest pain, severe lightheadedness, shortness of breath, other concerning symptoms.  Follow-up with primary care provider within 1 week if symptoms persist.    Discharge Medication List as of 5/7/2021 12:28 PM          Final diagnoses:   Dizziness   Fatigue     ALISTAIR Mcarthur on 5/8/2021 at 10:47 AM   5/7/2021   [facility]     Maxx Medina PA  05/08/21 1047

## 2021-05-07 NOTE — ED NOTES
"Pt presents to ED from work after feeling dizzy and some chest \"discomfort\"-denies pain, \"just heaviness\".   Pt states he may also have some anxiety, \"I feel shaky and kind of like crying\". Reassured with some therapeutic talk, reviewed plan of care. Pt states understanding.   "

## 2021-05-07 NOTE — LETTER
May 7, 2021      To Whom It May Concern:      Rajendra Gagnon was seen in our Emergency Department today, 05/07/21 for dizziness and may return to work without restrictions.    Sincerely,        Maxx Medina PA

## 2021-05-10 NOTE — PROGRESS NOTES
"    Assessment & Plan       ICD-10-CM    1. CLAUDETTE (generalized anxiety disorder)  F41.1 DULoxetine (CYMBALTA) 20 MG capsule   2. Adjustment disorder with mixed anxiety and depressed mood  F43.23 DULoxetine (CYMBALTA) 20 MG capsule   long discussion and pt feels and I do to that this is anxiety produced.  Got lots on his plate. Pt had been researching on Kava and 5-HTP for supplements.  Discussed on putting his efforts in something that has been proven.  Pt agreed to try Cymbalta. Will start slow and work up.  Consider counseling if can get him feeling better. Pt to follow-up in 5-6 weeks and to call if any issues.       Review of external notes as documented elsewhere in note  22 minutes spent on the date of the encounter doing chart review, history and exam, documentation and further activities per the note           No follow-ups on file.    Bhupinder Mckeon MD  [facility] - NAIN Drew is a 56 year old who presents for the following health issues     HPI     ED/UC Followup:    [place]  [facility] [place] of visit: 5/7/21  Reason for visit: Dizziness/fatigue  Current Status: still having dizziness and fatigue, has been taking benadryl, shortness of breath, shaky, nausea, tunnel vision     PHQ 1/30/2019 1/6/2021 5/11/2021   PHQ-9 Total Score 4 6 12   Q9: Thoughts of better off dead/self-harm past 2 weeks Not at all Not at all Not at all     CLAUDETTE-7 SCORE 1/30/2019 1/6/2021 5/11/2021   Total Score 3 8 11       ER note reviewed  Pt under lots of stress again  H/o CLAUDETTE and somatic complaints   Today he feels it is anxiety causing his sx   Was tried on zoloft and effexor and had side effects.         Review of Systems   Constitutional, HEENT, cardiovascular, pulmonary, gi and gu systems are negative, except as otherwise noted.      Objective    /74   Pulse 96   Temp 98.2  F (36.8  C)   Ht 1.861 m (6' 1.25\")   Wt 120.2 kg (265 lb)   SpO2 97%   BMI 34.72 kg/m    There is no height or " weight on file to calculate BMI.  Physical Exam   GENERAL: healthy, alert and no distress  PSYCH: mentation appears normal, affect anxious/stress/ flat affect .just not happy - this is baseline for him

## 2021-05-11 ENCOUNTER — OFFICE VISIT (OUTPATIENT)
Dept: FAMILY MEDICINE | Facility: OTHER | Age: 56
End: 2021-05-11
Attending: FAMILY MEDICINE
Payer: COMMERCIAL

## 2021-05-11 VITALS
HEART RATE: 96 BPM | SYSTOLIC BLOOD PRESSURE: 122 MMHG | HEIGHT: 73 IN | WEIGHT: 265 LBS | TEMPERATURE: 98.2 F | DIASTOLIC BLOOD PRESSURE: 74 MMHG | BODY MASS INDEX: 35.12 KG/M2 | OXYGEN SATURATION: 97 %

## 2021-05-11 DIAGNOSIS — F43.23 ADJUSTMENT DISORDER WITH MIXED ANXIETY AND DEPRESSED MOOD: ICD-10-CM

## 2021-05-11 DIAGNOSIS — F41.1 GAD (GENERALIZED ANXIETY DISORDER): Primary | ICD-10-CM

## 2021-05-11 PROCEDURE — 99213 OFFICE O/P EST LOW 20 MIN: CPT | Performed by: FAMILY MEDICINE

## 2021-05-11 RX ORDER — DIMENHYDRINATE 50 MG
1 TABLET ORAL DAILY
COMMUNITY
End: 2022-02-16

## 2021-05-11 RX ORDER — DULOXETIN HYDROCHLORIDE 20 MG/1
CAPSULE, DELAYED RELEASE ORAL
Qty: 60 CAPSULE | Refills: 1 | Status: SHIPPED | OUTPATIENT
Start: 2021-05-11 | End: 2022-02-16

## 2021-05-11 ASSESSMENT — ANXIETY QUESTIONNAIRES
1. FEELING NERVOUS, ANXIOUS, OR ON EDGE: SEVERAL DAYS
2. NOT BEING ABLE TO STOP OR CONTROL WORRYING: MORE THAN HALF THE DAYS
5. BEING SO RESTLESS THAT IT IS HARD TO SIT STILL: SEVERAL DAYS
7. FEELING AFRAID AS IF SOMETHING AWFUL MIGHT HAPPEN: MORE THAN HALF THE DAYS
IF YOU CHECKED OFF ANY PROBLEMS ON THIS QUESTIONNAIRE, HOW DIFFICULT HAVE THESE PROBLEMS MADE IT FOR YOU TO DO YOUR WORK, TAKE CARE OF THINGS AT HOME, OR GET ALONG WITH OTHER PEOPLE: SOMEWHAT DIFFICULT
3. WORRYING TOO MUCH ABOUT DIFFERENT THINGS: MORE THAN HALF THE DAYS
GAD7 TOTAL SCORE: 11
6. BECOMING EASILY ANNOYED OR IRRITABLE: SEVERAL DAYS
4. TROUBLE RELAXING: MORE THAN HALF THE DAYS

## 2021-05-11 ASSESSMENT — PAIN SCALES - GENERAL: PAINLEVEL: NO PAIN (0)

## 2021-05-11 ASSESSMENT — PATIENT HEALTH QUESTIONNAIRE - PHQ9: SUM OF ALL RESPONSES TO PHQ QUESTIONS 1-9: 12

## 2021-05-11 ASSESSMENT — MIFFLIN-ST. JEOR: SCORE: 2099.87

## 2021-05-11 NOTE — NURSING NOTE
"Chief Complaint   Patient presents with     ER F/U       Initial /74   Pulse 96   Temp 98.2  F (36.8  C)   Ht 1.861 m (6' 1.25\")   Wt 120.2 kg (265 lb)   SpO2 97%   BMI 34.72 kg/m   Estimated body mass index is 34.72 kg/m  as calculated from the following:    Height as of this encounter: 1.861 m (6' 1.25\").    Weight as of this encounter: 120.2 kg (265 lb).  Medication Reconciliation: complete  Juan A Vitale LPN  "

## 2021-05-12 ASSESSMENT — ANXIETY QUESTIONNAIRES: GAD7 TOTAL SCORE: 11

## 2021-06-22 ENCOUNTER — TELEPHONE (OUTPATIENT)
Dept: ALLERGY | Facility: OTHER | Age: 56
End: 2021-06-22

## 2021-06-22 NOTE — TELEPHONE ENCOUNTER
Attempted to reach patient to discuss what MQT allergy testing is, and how we do it.  Patient had questions about the specifics of testing, prior to deciding if he wanted to have it done per Dorcas GAITAN with Raeann Walker CNP.  No answer, left a message requesting a return call.    Marshall Moreno RN on 6/22/2021 at 4:26 PM

## 2021-06-24 NOTE — TELEPHONE ENCOUNTER
Attempted to reach patient again. Called both his home and cell numbers.  No answers.  Left messages on both requesting a return call.    Marshall Moreno RN on 6/24/2021 at 2:29 PM

## 2021-07-02 NOTE — TELEPHONE ENCOUNTER
Attempted to reach patient again to discuss allergy testing.  No answer, message left requesting a return call.  Will have PAC send a letter.    Marshall Moreno RN on 7/2/2021 at 3:38 PM

## 2021-10-03 ENCOUNTER — HEALTH MAINTENANCE LETTER (OUTPATIENT)
Age: 56
End: 2021-10-03

## 2022-02-16 ENCOUNTER — OFFICE VISIT (OUTPATIENT)
Dept: OTOLARYNGOLOGY | Facility: OTHER | Age: 57
End: 2022-02-16
Attending: PHYSICIAN ASSISTANT
Payer: COMMERCIAL

## 2022-02-16 ENCOUNTER — TELEPHONE (OUTPATIENT)
Dept: ALLERGY | Facility: OTHER | Age: 57
End: 2022-02-16

## 2022-02-16 VITALS
TEMPERATURE: 97.8 F | BODY MASS INDEX: 33.37 KG/M2 | WEIGHT: 260 LBS | SYSTOLIC BLOOD PRESSURE: 128 MMHG | DIASTOLIC BLOOD PRESSURE: 76 MMHG | OXYGEN SATURATION: 99 % | HEART RATE: 80 BPM | HEIGHT: 74 IN

## 2022-02-16 DIAGNOSIS — J30.2 PERENNIAL ALLERGIC RHINITIS WITH SEASONAL VARIATION: ICD-10-CM

## 2022-02-16 DIAGNOSIS — L50.9 URTICARIA: Primary | ICD-10-CM

## 2022-02-16 DIAGNOSIS — J30.89 PERENNIAL ALLERGIC RHINITIS WITH SEASONAL VARIATION: ICD-10-CM

## 2022-02-16 DIAGNOSIS — B99.9 RASH OR SKIN ERUPTION ACCOMPANYING INFECTIOUS DISEASE: ICD-10-CM

## 2022-02-16 LAB
ALBUMIN SERPL-MCNC: 4.4 G/DL (ref 3.4–5)
ALP SERPL-CCNC: 81 U/L (ref 40–150)
ALT SERPL W P-5'-P-CCNC: 40 U/L (ref 0–70)
ANION GAP SERPL CALCULATED.3IONS-SCNC: 3 MMOL/L (ref 3–14)
AST SERPL W P-5'-P-CCNC: 24 U/L (ref 0–45)
BILIRUB SERPL-MCNC: 0.5 MG/DL (ref 0.2–1.3)
BUN SERPL-MCNC: 19 MG/DL (ref 7–30)
CALCIUM SERPL-MCNC: 9.1 MG/DL (ref 8.5–10.1)
CHLORIDE BLD-SCNC: 106 MMOL/L (ref 94–109)
CO2 SERPL-SCNC: 29 MMOL/L (ref 20–32)
CREAT SERPL-MCNC: 1.09 MG/DL (ref 0.66–1.25)
ERYTHROCYTE [DISTWIDTH] IN BLOOD BY AUTOMATED COUNT: 12.9 % (ref 10–15)
GFR SERPL CREATININE-BSD FRML MDRD: 81 ML/MIN/1.73M2
GLUCOSE BLD-MCNC: 86 MG/DL (ref 70–99)
HCT VFR BLD AUTO: 44.9 % (ref 40–53)
HGB BLD-MCNC: 15.7 G/DL (ref 13.3–17.7)
MCH RBC QN AUTO: 30.4 PG (ref 26.5–33)
MCHC RBC AUTO-ENTMCNC: 35 G/DL (ref 31.5–36.5)
MCV RBC AUTO: 87 FL (ref 78–100)
PLATELET # BLD AUTO: 196 10E3/UL (ref 150–450)
POTASSIUM BLD-SCNC: 4.5 MMOL/L (ref 3.4–5.3)
PROT SERPL-MCNC: 7.4 G/DL (ref 6.8–8.8)
RBC # BLD AUTO: 5.16 10E6/UL (ref 4.4–5.9)
SODIUM SERPL-SCNC: 138 MMOL/L (ref 133–144)
TSH SERPL DL<=0.005 MIU/L-ACNC: 1.87 MU/L (ref 0.4–4)
WBC # BLD AUTO: 7.6 10E3/UL (ref 4–11)

## 2022-02-16 PROCEDURE — 99214 OFFICE O/P EST MOD 30 MIN: CPT | Performed by: PHYSICIAN ASSISTANT

## 2022-02-16 PROCEDURE — 36415 COLL VENOUS BLD VENIPUNCTURE: CPT | Performed by: PHYSICIAN ASSISTANT

## 2022-02-16 PROCEDURE — 84443 ASSAY THYROID STIM HORMONE: CPT | Performed by: PHYSICIAN ASSISTANT

## 2022-02-16 PROCEDURE — 80053 COMPREHEN METABOLIC PANEL: CPT | Performed by: PHYSICIAN ASSISTANT

## 2022-02-16 PROCEDURE — 85027 COMPLETE CBC AUTOMATED: CPT | Performed by: PHYSICIAN ASSISTANT

## 2022-02-16 RX ORDER — FEXOFENADINE HCL 180 MG/1
180 TABLET ORAL DAILY
Qty: 90 TABLET | Refills: 3 | Status: SHIPPED | OUTPATIENT
Start: 2022-02-16 | End: 2022-09-28

## 2022-02-16 RX ORDER — CETIRIZINE HYDROCHLORIDE 10 MG/1
10 TABLET ORAL DAILY
COMMUNITY
End: 2022-09-28

## 2022-02-16 ASSESSMENT — PAIN SCALES - GENERAL: PAINLEVEL: NO PAIN (0)

## 2022-02-16 NOTE — TELEPHONE ENCOUNTER
Went over instructions with patient for allergy skin testing.  Reviewed patients current medications and patient will avoid all contraindicated medications prior to MQT testing.  Patient verbalizes understanding.  Copy of allergy testing packet will be mailed to the patient.  Patient is aware that he will be called to schedule his testing.  He is advised to call if he has any questions.    Marshall Moreno RN on 2/16/2022 at 4:39 PM

## 2022-02-16 NOTE — LETTER
2022         RE: Rajendra Gagnon  [id number] [facility] [place] [address] [address]        Dear Colleague,    Thank you for referring your patient, Rajendra Gagnon, to the [facility]. Please see a copy of my visit note below.    Otolaryngology Consultation    Patient: Rajendra Gagnon  : 1965    Patient presents with:  Consult: Referred by Dr. Solis for urticaria.      HPI:  Rajendra Gagnon is a 56 year old male seen today for urticaria.     Rajendra is a 56 year old male who I am seeing via telephone visit for allergy consultation.  He completed a telephone consult with Raeann on 20 due to similar complaints but did not return for MQT.   Today, he presents for ongoing concerns over the last several weeks. He reports symptoms are more common during the fall- winter months. He has no angy correlation between cold, heat, or known exposure.   He has been using daily Zyrtec and  Benadryl every 4-6 hours.   He states he started with hand/ feet itching, chest prickly, shortness of breath. He has been seen in ED for these events which may have been contributed to anxiety/ stress.     He reports a several year history of hives that he experiences on a yearly basis in the late fall/early winter.  Symptoms start with itchy scalp, palms, feet and eventually turn into hives.  Hives usually start on hands, feet and ankles and move to all over body.  He was seen in the ED on 20 for hives, pictures below are from his ED visit.  A C1 esterase level was ordered and was normal.    He was treated with Prednisone with improvement.  He has an epi pen that he keeps with him.     He felt symptoms worsened over the last few weeks   January worsened symptoms.   He does not use any lotion/ creams   He has been using dial soap throughout the full year.   No current use of lotions/ creams.   No use of Epipen.   No angy food correlation  Increase in stress.      He has had previous work up in the past  with Dr Mota, Dr Lundberg and Dr Boggs for urticaria.  He was allergy tested in April 2009, moderate sensitivities to grass, birch, dust, alternaria, thistle, elm, oak, grain smut and dog,  Low sensitives to ragweed maple, aspergillus, pigweed, kochia, gayle, pine.     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  He was treated with 3 months of keflex.   He has been treated with Zyrtec in the past with improvement in hives/angioedema symptoms.  In reviewing past medical records it was suggested that this could be idiopathic urticaria but definitive diagnosis was not made.  He did have a normal C1 inhibitor in April 2008.       He has symptoms that start at home or work.  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  There is multiple exposures at plant - stagnant water, etc.    Hives start on hands and ankle, feet, and go all over the body.  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   He feels more congested in winter - close up  Of note he also has a history of acoustic neuroma with surgical removal and no serviceable hearing in the right ear.        Resides in an older home with a basement.   There is questionable water or mold.   Carpet- none bedroom.   Heat in home- Forced air.   Animals- None.   Family hx of allergies - Mild.   Past trials of medications - Zyrtec.   Tried Prednisone in past for urticaria.        Current Outpatient Rx   Medication Sig Dispense Refill     Acetaminophen (TYLENOL PO) Take 650 mg by mouth       Calcium-Magnesium-Zinc 333-133-5 MG TABS per tablet Take 1 tablet by mouth daily       DiphenhydrAMINE  HCl (BENADRYL PO) Take 50 mg by mouth        DULoxetine (CYMBALTA) 20 MG capsule 1 capsule orally daily for 2 weeks then 2 caps orally daily for 2 weeks then 3 caps orally  daily 60 capsule 1     EPINEPHrine (ANY BX GENERIC EQUIV) 0.3 MG/0.3ML injection 2-pack Inject 0.3 mLs (0.3 mg) into the muscle once as needed for anaphylaxis 1 each 3     Flaxseed, Linseed, (FLAX SEED OIL) 1000 MG capsule Take 1 capsule by mouth daily       metroNIDAZOLE (METROGEL) 0.75 % topical gel ,Apply to redness of the face morning and bedtime 45 g 3     Multiple Vitamins-Minerals (MULTIVITAMIN OR) Take 1 tablet by mouth daily with food.       vitamin B complex with vitamin C (VITAMIN  B COMPLEX) tablet Take 0.5 tablets by mouth daily       VITAMIN D, CHOLECALCIFEROL, PO Take 2,000 Units by mouth daily        vitamin E 400 units TABS Take 400 Units by mouth daily         Allergies: Aspartame, Venlafaxine, and Zoloft [sertraline]     Past Medical History:   Diagnosis Date     Dysthymia 3/26/2012     Hearing loss in right ear 3/14/2012     History of atrial fibrillation 4/23/2012     Hypertriglyceridemia      Right acoustic neuroma 3/14/2012     Tinnitus, unspecified 3/14/2012     Umbilical hernia without obstruction and without gangrene 5/11/2016     Varicose veins      Varicose veins of both lower extremities 5/12/2017       Past Surgical History:   Procedure Laterality Date     acustic neuroma removed  3/2007    right     BIOPSY PROSTATE TRANSRECTAL N/A 2/5/2019    Procedure: TRANSRECTAL Ultrasound BIOPSY of PROSTATE;  Surgeon: Filemon Betts MD;  Location: [facility]     COLONOSCOPY N/A 1/3/2018    Repeat in 2028//Procedure: COLONOSCOPY;  COLONOSCOPY;  Surgeon: Reggie Ron DO;  Location: [facility] OR     funnel chest[      states had chest reconstruction     HERNIA REPAIR       ORTHOPEDIC SURGERY      right knee surgery     PE TUBES         ENT family history reviewed    Social History     Tobacco Use     Smoking status: Never Smoker      "Smokeless tobacco: Never Used   Substance Use Topics     Alcohol use: Yes     Alcohol/week: 3.3 standard drinks     Types: 4 Cans of beer per week     Comment: rarely     Drug use: No       Review of Systems  ROS: 10 point ROS neg other than the symptoms noted above in the HPI     Physical Exam  /76 (Cuff Size: Adult Large)   Pulse 80   Temp 97.8  F (36.6  C) (Tympanic)   Ht 1.88 m (6' 2\")   Wt 117.9 kg (260 lb)   SpO2 99%   BMI 33.38 kg/m    General - The patient is well nourished and well developed, and appears to have good nutritional status.  Alert and oriented to person and place, answers questions and cooperates with examination appropriately.   Head and Face - Normocephalic and atraumatic, with no gross asymmetry noted.  The facial nerve is intact, with strong symmetric movements.  Voice and Breathing - The patient was breathing comfortably without the use of accessory muscles. There was no wheezing, stridor, or stertor.  The patients voice was clear and strong, and had appropriate pitch and quality.  Ears -The external auditory canals are patent, the tympanic membranes are intact without effusion, retraction or mass.  Bony landmarks are intact.  Eyes - Extraocular movements intact, and the pupils were reactive to light.  Sclera were not icteric or injected, conjunctiva were pink and moist.  Mouth - Examination of the oral cavity showed pink, healthy oral mucosa. No lesions or ulcerations noted.  The tongue was mobile and midline, and the dentition were in good condition.    Throat - The walls of the oropharynx were smooth, pink, moist, symmetric, and had no lesions or ulcerations.  The tonsillar pillars and soft palate were symmetric.  The uvula was midline on elevation.  FTP IIb  Neck - Normal midline excursion of the laryngotracheal complex during swallowing.  Full range of motion on passive movement.  Palpation of the occipital, submental, submandibular, internal jugular chain, and " supraclavicular nodes did not demonstrate any abnormal lymph nodes or masses.  Palpation of the thyroid was soft and smooth, with no nodules or goiter appreciated.  The trachea was mobile and midline.  Nose - External contour is symmetric, no gross deflection or scars.  Nasal mucosa is pink and moist with no abnormal mucus.  No polyps, masses, or purulence noted on examination.  Skin- Round papular wheal appearance along hands.   He has two small areas on right forearm appear with cluster of red, round, dusky center.     Impression and Plan- Rajendra Gagnon is a 56 year old male with:    ICD-10-CM    1. Urticaria  L50.9 fexofenadine (ALLEGRA) 180 MG tablet     TSH with free T4 reflex     CBC with platelets     Comprehensive metabolic panel (BMP + Alb, Alk Phos, ALT, AST, Total. Bili, TP)     Adult Dermatology Referral     TSH with free T4 reflex     CBC with platelets     Comprehensive metabolic panel (BMP + Alb, Alk Phos, ALT, AST, Total. Bili, TP)   2. Rash or skin eruption accompanying infectious disease  B99.9 TSH with free T4 reflex     CBC with platelets     Comprehensive metabolic panel (BMP + Alb, Alk Phos, ALT, AST, Total. Bili, TP)     Adult Dermatology Referral     TSH with free T4 reflex     CBC with platelets     Comprehensive metabolic panel (BMP + Alb, Alk Phos, ALT, AST, Total. Bili, TP)   3. Perennial allergic rhinitis with seasonal variation  J30.89     J30.2          Return for MQT for environmental panel.   Recommended Dermatology referral to Dr. Johnson.   Consider repeat biopsy.     Continue with Zyrtec 10 mg HS  Start Allegra 180 mg AM  Hold for 7 days prior to MQT.   Labs pending   Follow up for annual physical as well with primary physician.   Consider Sleep study.      Further referral for allergy clinic may be warranted.  He verbalized understanding and did state that he wants to be tested with our environmental panel.  I advised start Zyrtec daily to for urticaria, this will have to be  stopped 7 days prior to allergy testing.  He is also taking benadryl each night before bed for nasal congestion and sleep.  He will have to stop that also.  He verbalized understanding.       Follow up after testing.       Indications for allergy testing include:    1) Confirm suspicion of allergic rhinitis due to inhalant allergies  2) Identify the offending allergen to determine specific mode of treatment  3) In the case of chronic rhinosinusitis: when symptoms are not controlled by avoidance and pharmacotherapy  4) In the Asthma patient when exacerbations may be due to perennial allergen exposure  5) Suspect food allergy  6) Otitis Media, chronic rhinitis, atopic dermatitis, Meniere disease, headache, pharyngitis or eye symptoms        Modified quantitative testing (MQT) will be performed.  Signed consent was obtained, and the risks of immunotherapy were discussed, including the potential for anaphylaxis.     If immunotherapy (IT) is recommended, there is continued risk of anaphylaxis.   Anaphylaxis can cause death. The patient will need to be monitored for 30 minutes post injection.  They must present their epinephrine pen prior to injection.  Subcutaneous as well as sublingual immunotherapy (SLIT) were discussed as potential treatment options.  The patient was told SLIT is not approved by the FDA and is cash pay.  The general time frame of immunotherapy was discussed (generally 3-5 years, sometimes longer), and the basic immunology behind IT was discussed    Jeanna Celeste PA-C  ENT  [facility], [place]          Again, thank you for allowing me to participate in the care of your patient.        Sincerely,        Jeanna Celeste PA-C

## 2022-02-16 NOTE — PATIENT INSTRUCTIONS
Referral to Dermatology  Increase to Zyrtec 10 mg twice a day.   May use Allegra one tablet in AM.   Return for allergy testing. Hold antihistamines for allergy testing for 7 days prior.     Complete labs today-       Thank you for allowing Jeanna Celeste PA-C and our ENT team to participate in your care.  If your medications are too expensive, please give the nurse a call.  We can possibly change this medication.  If you have a scheduling or an appointment question please contact our Health Unit Coordinator at [phone], Ext. [phone].    ALL nursing questions or concerns can be directed to your ENT nurse at: [phone] [place]

## 2022-02-16 NOTE — PROGRESS NOTES
Otolaryngology Consultation    Patient: Rajendra Gagnon  : 1965    Patient presents with:  Consult: Referred by Dr. Solis for urticaria.      HPI:  Rajendra Gagnon is a 56 year old male seen today for urticaria.     Rajendra is a 56 year old male who I am seeing via telephone visit for allergy consultation.  He completed a telephone consult with Raeann on 20 due to similar complaints but did not return for MQT.   Today, he presents for ongoing concerns over the last several weeks. He reports symptoms are more common during the fall- winter months. He has no angy correlation between cold, heat, or known exposure.   He has been using daily Zyrtec and  Benadryl every 4-6 hours.   He states he started with hand/ feet itching, chest prickly, shortness of breath. He has been seen in ED for these events which may have been contributed to anxiety/ stress.     He reports a several year history of hives that he experiences on a yearly basis in the late fall/early winter.  Symptoms start with itchy scalp, palms, feet and eventually turn into hives.  Hives usually start on hands, feet and ankles and move to all over body.  He was seen in the ED on 20 for hives, pictures below are from his ED visit.  A C1 esterase level was ordered and was normal.    He was treated with Prednisone with improvement.  He has an epi pen that he keeps with him.     He felt symptoms worsened over the last few weeks   January worsened symptoms.   He does not use any lotion/ creams   He has been using dial soap throughout the full year.   No current use of lotions/ creams.   No use of Epipen.   No angy food correlation  Increase in stress.      He has had previous work up in the past with Dr Mota, Dr Lundberg and Dr Boggs for urticaria.  He was allergy tested in 2009, moderate sensitivities to grass, birch, dust, alternaria, thistle, elm, oak, grain smut and dog,  Low sensitives to ragweed maple, aspergillus, pigweed,  kochia, gayle, pine.     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  He was treated with 3 months of keflex.   He has been treated with Zyrtec in the past with improvement in hives/angioedema symptoms.  In reviewing past medical records it was suggested that this could be idiopathic urticaria but definitive diagnosis was not made.  He did have a normal C1 inhibitor in April 2008.       He has symptoms that start at home or work.  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  There is multiple exposures at plant - stagnant water, etc.    Hives start on hands and ankle, feet, and go all over the body.  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   He feels more congested in winter - close up  Of note he also has a history of acoustic neuroma with surgical removal and no serviceable hearing in the right ear.        Resides in an older home with a basement.   There is questionable water or mold.   Carpet- none bedroom.   Heat in home- Forced air.   Animals- None.   Family hx of allergies - Mild.   Past trials of medications - Zyrtec.   Tried Prednisone in past for urticaria.        Current Outpatient Rx   Medication Sig Dispense Refill     Acetaminophen (TYLENOL PO) Take 650 mg by mouth       Calcium-Magnesium-Zinc 333-133-5 MG TABS per tablet Take 1 tablet by mouth daily       DiphenhydrAMINE HCl (BENADRYL PO) Take 50 mg by mouth        DULoxetine (CYMBALTA) 20 MG capsule 1 capsule orally daily for 2 weeks then 2 caps orally daily for 2 weeks then 3 caps orally  daily 60 capsule 1     EPINEPHrine (ANY BX GENERIC EQUIV) 0.3 MG/0.3ML  injection 2-pack Inject 0.3 mLs (0.3 mg) into the muscle once as needed for anaphylaxis 1 each 3     Flaxseed, Linseed, (FLAX SEED OIL) 1000 MG capsule Take 1 capsule by mouth daily       metroNIDAZOLE (METROGEL) 0.75 % topical gel ,Apply to redness of the face morning and bedtime 45 g 3     Multiple Vitamins-Minerals (MULTIVITAMIN OR) Take 1 tablet by mouth daily with food.       vitamin B complex with vitamin C (VITAMIN  B COMPLEX) tablet Take 0.5 tablets by mouth daily       VITAMIN D, CHOLECALCIFEROL, PO Take 2,000 Units by mouth daily        vitamin E 400 units TABS Take 400 Units by mouth daily         Allergies: Aspartame, Venlafaxine, and Zoloft [sertraline]     Past Medical History:   Diagnosis Date     Dysthymia 3/26/2012     Hearing loss in right ear 3/14/2012     History of atrial fibrillation 4/23/2012     Hypertriglyceridemia      Right acoustic neuroma 3/14/2012     Tinnitus, unspecified 3/14/2012     Umbilical hernia without obstruction and without gangrene 5/11/2016     Varicose veins      Varicose veins of both lower extremities 5/12/2017       Past Surgical History:   Procedure Laterality Date     acustic neuroma removed  3/2007    right     BIOPSY PROSTATE TRANSRECTAL N/A 2/5/2019    Procedure: TRANSRECTAL Ultrasound BIOPSY of PROSTATE;  Surgeon: Filemon Betts MD;  Location: [facility] OR     COLONOSCOPY N/A 1/3/2018    Repeat in 2028//Procedure: COLONOSCOPY;  COLONOSCOPY;  Surgeon: Reggie Ron DO;  Location: [facility] OR     funnel chest[      states had chest reconstruction     HERNIA REPAIR       ORTHOPEDIC SURGERY      right knee surgery     PE TUBES         ENT family history reviewed    Social History     Tobacco Use     Smoking status: Never Smoker     Smokeless tobacco: Never Used   Substance Use Topics     Alcohol use: Yes     Alcohol/week: 3.3 standard drinks     Types: 4 Cans of beer per week     Comment: rarely     Drug use: No       Review of Systems  ROS: 10 point ROS neg other than the  "symptoms noted above in the HPI     Physical Exam  /76 (Cuff Size: Adult Large)   Pulse 80   Temp 97.8  F (36.6  C) (Tympanic)   Ht 1.88 m (6' 2\")   Wt 117.9 kg (260 lb)   SpO2 99%   BMI 33.38 kg/m    General - The patient is well nourished and well developed, and appears to have good nutritional status.  Alert and oriented to person and place, answers questions and cooperates with examination appropriately.   Head and Face - Normocephalic and atraumatic, with no gross asymmetry noted.  The facial nerve is intact, with strong symmetric movements.  Voice and Breathing - The patient was breathing comfortably without the use of accessory muscles. There was no wheezing, stridor, or stertor.  The patients voice was clear and strong, and had appropriate pitch and quality.  Ears -The external auditory canals are patent, the tympanic membranes are intact without effusion, retraction or mass.  Bony landmarks are intact.  Eyes - Extraocular movements intact, and the pupils were reactive to light.  Sclera were not icteric or injected, conjunctiva were pink and moist.  Mouth - Examination of the oral cavity showed pink, healthy oral mucosa. No lesions or ulcerations noted.  The tongue was mobile and midline, and the dentition were in good condition.    Throat - The walls of the oropharynx were smooth, pink, moist, symmetric, and had no lesions or ulcerations.  The tonsillar pillars and soft palate were symmetric.  The uvula was midline on elevation.  FTP IIb  Neck - Normal midline excursion of the laryngotracheal complex during swallowing.  Full range of motion on passive movement.  Palpation of the occipital, submental, submandibular, internal jugular chain, and supraclavicular nodes did not demonstrate any abnormal lymph nodes or masses.  Palpation of the thyroid was soft and smooth, with no nodules or goiter appreciated.  The trachea was mobile and midline.  Nose - External contour is symmetric, no gross " deflection or scars.  Nasal mucosa is pink and moist with no abnormal mucus.  No polyps, masses, or purulence noted on examination.  Skin- Round papular wheal appearance along hands.   He has two small areas on right forearm appear with cluster of red, round, dusky center.     Impression and Plan- Rajendra Gagnon is a 56 year old male with:    ICD-10-CM    1. Urticaria  L50.9 fexofenadine (ALLEGRA) 180 MG tablet     TSH with free T4 reflex     CBC with platelets     Comprehensive metabolic panel (BMP + Alb, Alk Phos, ALT, AST, Total. Bili, TP)     Adult Dermatology Referral     TSH with free T4 reflex     CBC with platelets     Comprehensive metabolic panel (BMP + Alb, Alk Phos, ALT, AST, Total. Bili, TP)   2. Rash or skin eruption accompanying infectious disease  B99.9 TSH with free T4 reflex     CBC with platelets     Comprehensive metabolic panel (BMP + Alb, Alk Phos, ALT, AST, Total. Bili, TP)     Adult Dermatology Referral     TSH with free T4 reflex     CBC with platelets     Comprehensive metabolic panel (BMP + Alb, Alk Phos, ALT, AST, Total. Bili, TP)   3. Perennial allergic rhinitis with seasonal variation  J30.89     J30.2          Return for MQT for environmental panel.   Recommended Dermatology referral to Dr. Johnson.   Consider repeat biopsy.     Continue with Zyrtec 10 mg HS  Start Allegra 180 mg AM  Hold for 7 days prior to MQT.   Labs pending   Follow up for annual physical as well with primary physician.   Consider Sleep study.      Further referral for allergy clinic may be warranted.  He verbalized understanding and did state that he wants to be tested with our environmental panel.  I advised start Zyrtec daily to for urticaria, this will have to be stopped 7 days prior to allergy testing.  He is also taking benadryl each night before bed for nasal congestion and sleep.  He will have to stop that also.  He verbalized understanding.       Follow up after testing.       Indications for allergy  testing include:    1) Confirm suspicion of allergic rhinitis due to inhalant allergies  2) Identify the offending allergen to determine specific mode of treatment  3) In the case of chronic rhinosinusitis: when symptoms are not controlled by avoidance and pharmacotherapy  4) In the Asthma patient when exacerbations may be due to perennial allergen exposure  5) Suspect food allergy  6) Otitis Media, chronic rhinitis, atopic dermatitis, Meniere disease, headache, pharyngitis or eye symptoms        Modified quantitative testing (MQT) will be performed.  Signed consent was obtained, and the risks of immunotherapy were discussed, including the potential for anaphylaxis.     If immunotherapy (IT) is recommended, there is continued risk of anaphylaxis.   Anaphylaxis can cause death. The patient will need to be monitored for 30 minutes post injection.  They must present their epinephrine pen prior to injection.  Subcutaneous as well as sublingual immunotherapy (SLIT) were discussed as potential treatment options.  The patient was told SLIT is not approved by the FDA and is cash pay.  The general time frame of immunotherapy was discussed (generally 3-5 years, sometimes longer), and the basic immunology behind IT was discussed    Jeanna Celeste PA-C  ENT  [facility], [place]

## 2022-02-16 NOTE — NURSING NOTE
"Chief Complaint   Patient presents with     Consult     Referred by Dr. Solis for urticaria.       Initial /76 (Cuff Size: Adult Large)   Pulse 80   Temp 97.8  F (36.6  C) (Tympanic)   Ht 1.88 m (6' 2\")   Wt 117.9 kg (260 lb)   SpO2 99%   BMI 33.38 kg/m   Estimated body mass index is 33.38 kg/m  as calculated from the following:    Height as of this encounter: 1.88 m (6' 2\").    Weight as of this encounter: 117.9 kg (260 lb).  Medication Reconciliation: complete  Mini Holliday LPN    "

## 2022-02-18 ENCOUNTER — TELEPHONE (OUTPATIENT)
Dept: OTOLARYNGOLOGY | Facility: OTHER | Age: 57
End: 2022-02-18
Payer: COMMERCIAL

## 2022-02-18 DIAGNOSIS — L50.9 URTICARIA: Primary | ICD-10-CM

## 2022-02-18 DIAGNOSIS — D33.3 BENIGN NEOPLASM OF CRANIAL NERVES (H): ICD-10-CM

## 2022-02-18 DIAGNOSIS — Z86.018 HISTORY OF ACOUSTIC NEUROMA: ICD-10-CM

## 2022-02-18 DIAGNOSIS — F34.1 DYSTHYMIC DISORDER: ICD-10-CM

## 2022-02-18 DIAGNOSIS — B99.9 RASH OR SKIN ERUPTION ACCOMPANYING INFECTIOUS DISEASE: ICD-10-CM

## 2022-02-18 NOTE — TELEPHONE ENCOUNTER
Patient calls and is wondering if he could have an mri brain?  He states that he has a brain tumor and has not completed an mri in a while.  Also, he is wondering if he should see Psychiatry or a counselor to talk with someone?  He states that he has many issues going on and feels it would help to talk to someone.  Please sign off if this is okay.

## 2022-02-22 ENCOUNTER — TELEPHONE (OUTPATIENT)
Dept: ALLERGY | Facility: OTHER | Age: 57
End: 2022-02-22
Payer: COMMERCIAL

## 2022-02-22 NOTE — TELEPHONE ENCOUNTER
We will complete A referral.   Should complete MRi of IAC, brain. He has a hx of acoustic neuroma. TR    Jailyn please add order for IAC

## 2022-02-22 NOTE — TELEPHONE ENCOUNTER
Went over instructions with patient for allergy skin testing.  Reviewed patients current medications and patient will avoid all contraindicated medications prior to MQT testing.  Patient verbalizes understanding.  Copy of allergy testing packet will be mailed to the patient.  Patient is aware that he will be called to schedule his testing.  He is advised to call if he has any questions.    Marshall Moreno RN on 2/22/2022 at 1:57 PM

## 2022-03-07 ENCOUNTER — HOSPITAL ENCOUNTER (OUTPATIENT)
Dept: MRI IMAGING | Facility: HOSPITAL | Age: 57
Discharge: HOME | End: 2022-03-07
Attending: PHYSICIAN ASSISTANT | Admitting: PHYSICIAN ASSISTANT
Payer: COMMERCIAL

## 2022-03-07 DIAGNOSIS — D33.3 BENIGN NEOPLASM OF CRANIAL NERVES (H): ICD-10-CM

## 2022-03-07 DIAGNOSIS — Z86.018 HISTORY OF ACOUSTIC NEUROMA: ICD-10-CM

## 2022-03-07 PROCEDURE — 70543 MRI ORBT/FAC/NCK W/O &W/DYE: CPT

## 2022-03-07 PROCEDURE — A9585 GADOBUTROL INJECTION: HCPCS | Performed by: RADIOLOGY

## 2022-03-07 PROCEDURE — 255N000002 HC RX 255 OP 636: Performed by: RADIOLOGY

## 2022-03-07 RX ORDER — GADOBUTROL 604.72 MG/ML
10 INJECTION INTRAVENOUS ONCE
Status: COMPLETED | OUTPATIENT
Start: 2022-03-07 | End: 2022-03-07

## 2022-03-07 RX ADMIN — GADOBUTROL 10 ML: 604.72 INJECTION INTRAVENOUS at 17:30

## 2022-03-19 ENCOUNTER — HEALTH MAINTENANCE LETTER (OUTPATIENT)
Age: 57
End: 2022-03-19

## 2022-06-09 ENCOUNTER — TELEPHONE (OUTPATIENT)
Dept: ALLERGY | Facility: OTHER | Age: 57
End: 2022-06-09

## 2022-06-13 ENCOUNTER — OFFICE VISIT (OUTPATIENT)
Dept: DERMATOLOGY | Facility: OTHER | Age: 57
End: 2022-06-13
Attending: PHYSICIAN ASSISTANT
Payer: COMMERCIAL

## 2022-06-13 VITALS
BODY MASS INDEX: 33.37 KG/M2 | TEMPERATURE: 98.1 F | SYSTOLIC BLOOD PRESSURE: 130 MMHG | HEART RATE: 82 BPM | HEIGHT: 74 IN | OXYGEN SATURATION: 98 % | DIASTOLIC BLOOD PRESSURE: 74 MMHG | WEIGHT: 260 LBS

## 2022-06-13 DIAGNOSIS — L50.9 URTICARIA: ICD-10-CM

## 2022-06-13 DIAGNOSIS — B99.9 RASH OR SKIN ERUPTION ACCOMPANYING INFECTIOUS DISEASE: ICD-10-CM

## 2022-06-13 DIAGNOSIS — L28.0 LICHEN SIMPLEX CHRONICUS: Primary | ICD-10-CM

## 2022-06-13 PROCEDURE — 99213 OFFICE O/P EST LOW 20 MIN: CPT | Performed by: DERMATOLOGY

## 2022-06-13 RX ORDER — TRIAMCINOLONE ACETONIDE 1 MG/G
CREAM TOPICAL
Qty: 80 G | Refills: 3 | Status: SHIPPED | OUTPATIENT
Start: 2022-06-13 | End: 2024-04-02

## 2022-06-13 ASSESSMENT — PAIN SCALES - GENERAL: PAINLEVEL: NO PAIN (0)

## 2022-06-13 NOTE — LETTER
6/13/2022       RE: Rajendra Gagnon  [address]  [place] [address]     Dear Colleague,    Thank you for referring your patient, Rajendra Gagnon, to the [facility]. Please see a copy of my visit note below.    Visit Date: 06/13/2022    SUBJECTIVE:  Return visit for Rajendra thomas I saw four years ago for rosacea.  He has subsequently developed what appears to be episodic urticaria with an asthmatic attack from time to time.  He is getting allergy testing soon.  He is distressed by the fact that he is getting urticarial lesions off and on, which are frustrating. He also has dermatitis on his knee and his ankle.  Also, his wife would like him to have mole mapping because of some moles on his back.    OBJECTIVE:  Exam shows a healthy and pleasant gentleman in no distress.  Present on his chest and face are some urticarial lesions that appear to be resolving, appears to be an element of pressure urticaria but also spontaneous urticaria may be happening.  Again, his main problem is breathing, so he is getting that worked up in another department.  On his knee and ankle are two areas of lichen simplex chronicus.  I recommended triamcinolone 0.1 cream to use regularly to reduce the itch of these lesions.  I did check his back and he has about a dozen nevi, none of which to me looked at all atypical.  One in the lumbar area shows a dark center and a brown periphery, but I do see the type of a fried egg mole quite often, and I advised him that, in my opinion, this was harmless.  I felt that photographs with good cell phone would be as good as mole mapping.    ASSESSMENT AND PLAN:  For the urticaria, I suggested he take Allegra.  He does already take some antihistamines, but I advised that once he gets his testing done, that he might consider the Allegra on a regular preventative basis.  Return p.r.n. issues related to the urticaria or  the lichen simplex.  We also could also do some intralesional steroids for the lichen simplex.    AMMON Johnson MD        D: 2022   T: 2022   MT: KIMBERLEE    Name:     JUSTUS BILLINGS  MRN:      [id number]        Account:    [id number]   :      1965           Visit Date: 2022     Document: [id number]      Again, thank you for allowing me to participate in the care of your patient.      Sincerely,    AMMON Johnson MD

## 2022-06-13 NOTE — NURSING NOTE
"Chief Complaint   Patient presents with     Derm Problem     Rash       Initial /74 (Cuff Size: Adult Large)   Pulse 82   Temp 98.1  F (36.7  C) (Tympanic)   Ht 1.88 m (6' 2\")   Wt 117.9 kg (260 lb)   SpO2 98%   BMI 33.38 kg/m   Estimated body mass index is 33.38 kg/m  as calculated from the following:    Height as of this encounter: 1.88 m (6' 2\").    Weight as of this encounter: 117.9 kg (260 lb).  Medication Reconciliation: complete  Mini Holliday LPN    "

## 2022-06-13 NOTE — PROGRESS NOTES
Visit Date: 2022    SUBJECTIVE:  Return visit for Rajendra whom I saw four years ago for rosacea.  He has subsequently developed what appears to be episodic urticaria with an asthmatic attack from time to time.  He is getting allergy testing soon.  He is distressed by the fact that he is getting urticarial lesions off and on, which are frustrating. He also has dermatitis on his knee and his ankle.  Also, his wife would like him to have mole mapping because of some moles on his back.    OBJECTIVE:  Exam shows a healthy and pleasant gentleman in no distress.  Present on his chest and face are some urticarial lesions that appear to be resolving, appears to be an element of pressure urticaria but also spontaneous urticaria may be happening.  Again, his main problem is breathing, so he is getting that worked up in another department.  On his knee and ankle are two areas of lichen simplex chronicus.  I recommended triamcinolone 0.1 cream to use regularly to reduce the itch of these lesions.  I did check his back and he has about a dozen nevi, none of which to me looked at all atypical.  One in the lumbar area shows a dark center and a brown periphery, but I do see the type of a fried egg mole quite often, and I advised him that, in my opinion, this was harmless.  I felt that photographs with good cell phone would be as good as mole mapping.    ASSESSMENT AND PLAN:  For the urticaria, I suggested he take Allegra.  He does already take some antihistamines, but I advised that once he gets his testing done, that he might consider the Allegra on a regular preventative basis.  Return p.r.n. issues related to the urticaria or the lichen simplex.  We also could also do some intralesional steroids for the lichen simplex.    AMMON Johnson MD        D: 2022   T: 2022   MT: KIMBERLEE    Name:     RAJENDRA BILLINGS  MRN:      [id number]        Account:    [id number]   :      1965           Visit Date:  06/13/2022     Document: [id number]

## 2022-06-28 NOTE — TELEPHONE ENCOUNTER
Patient did return a call to writer on Fri 06/24/22.  Reviewed testing instructions, and that he needs to hold all antihistamines x 7 days (not 5 like he thought).  He also c/o chronic shortness of breath and overall feeling tired and malaise.  He was advised to contact his PCP for an appointment or to go to urgent care for evaluation.  He said the issues have been ongoing, and not new.  He was advised again to call his PCP or go to urgent care.  He verbalized understanding.    Marshall Walker RN on 6/28/2022 at 8:49 AM

## 2022-07-11 ENCOUNTER — TELEPHONE (OUTPATIENT)
Dept: FAMILY MEDICINE | Facility: OTHER | Age: 57
End: 2022-07-11

## 2022-07-11 ENCOUNTER — HOSPITAL ENCOUNTER (EMERGENCY)
Facility: HOSPITAL | Age: 57
Discharge: HOME | End: 2022-07-11
Attending: STUDENT IN AN ORGANIZED HEALTH CARE EDUCATION/TRAINING PROGRAM | Admitting: STUDENT IN AN ORGANIZED HEALTH CARE EDUCATION/TRAINING PROGRAM
Payer: COMMERCIAL

## 2022-07-11 VITALS
DIASTOLIC BLOOD PRESSURE: 98 MMHG | OXYGEN SATURATION: 98 % | TEMPERATURE: 98.7 F | WEIGHT: 258 LBS | BODY MASS INDEX: 33.13 KG/M2 | SYSTOLIC BLOOD PRESSURE: 161 MMHG | HEART RATE: 78 BPM | RESPIRATION RATE: 13 BRPM

## 2022-07-11 DIAGNOSIS — F41.9 ANXIETY: ICD-10-CM

## 2022-07-11 LAB
ANION GAP SERPL CALCULATED.3IONS-SCNC: 4 MMOL/L (ref 3–14)
BUN SERPL-MCNC: 14 MG/DL (ref 7–30)
CALCIUM SERPL-MCNC: 9.1 MG/DL (ref 8.5–10.1)
CHLORIDE BLD-SCNC: 107 MMOL/L (ref 94–109)
CO2 SERPL-SCNC: 27 MMOL/L (ref 20–32)
CREAT SERPL-MCNC: 1.15 MG/DL (ref 0.66–1.25)
GFR SERPL CREATININE-BSD FRML MDRD: 75 ML/MIN/1.73M2
GLUCOSE BLD-MCNC: 94 MG/DL (ref 70–99)
HOLD SPECIMEN: NORMAL
MAGNESIUM SERPL-MCNC: 2.5 MG/DL (ref 1.6–2.3)
POTASSIUM BLD-SCNC: 4.4 MMOL/L (ref 3.4–5.3)
SODIUM SERPL-SCNC: 138 MMOL/L (ref 133–144)

## 2022-07-11 PROCEDURE — 250N000013 HC RX MED GY IP 250 OP 250 PS 637: Performed by: STUDENT IN AN ORGANIZED HEALTH CARE EDUCATION/TRAINING PROGRAM

## 2022-07-11 PROCEDURE — 93010 ELECTROCARDIOGRAM REPORT: CPT | Performed by: INTERNAL MEDICINE

## 2022-07-11 PROCEDURE — 99283 EMERGENCY DEPT VISIT LOW MDM: CPT

## 2022-07-11 PROCEDURE — 99283 EMERGENCY DEPT VISIT LOW MDM: CPT | Performed by: STUDENT IN AN ORGANIZED HEALTH CARE EDUCATION/TRAINING PROGRAM

## 2022-07-11 PROCEDURE — 83735 ASSAY OF MAGNESIUM: CPT | Performed by: STUDENT IN AN ORGANIZED HEALTH CARE EDUCATION/TRAINING PROGRAM

## 2022-07-11 PROCEDURE — 36415 COLL VENOUS BLD VENIPUNCTURE: CPT | Performed by: STUDENT IN AN ORGANIZED HEALTH CARE EDUCATION/TRAINING PROGRAM

## 2022-07-11 PROCEDURE — 80048 BASIC METABOLIC PNL TOTAL CA: CPT | Performed by: STUDENT IN AN ORGANIZED HEALTH CARE EDUCATION/TRAINING PROGRAM

## 2022-07-11 RX ORDER — LORAZEPAM 1 MG/1
1 TABLET ORAL ONCE
Status: DISCONTINUED | OUTPATIENT
Start: 2022-07-11 | End: 2022-07-11

## 2022-07-11 RX ORDER — LORAZEPAM 1 MG/1
2 TABLET ORAL ONCE
Status: COMPLETED | OUTPATIENT
Start: 2022-07-11 | End: 2022-07-11

## 2022-07-11 RX ADMIN — LORAZEPAM 2 MG: 1 TABLET ORAL at 15:12

## 2022-07-11 NOTE — Clinical Note
Rajendra Gagnon was seen and treated in our emergency department on 7/11/2022.  He may return to work on 07/12/2022.       If you have any questions or concerns, please don't hesitate to call.      Colton Bowser MD

## 2022-07-11 NOTE — ED PROVIDER NOTES
History     Chief Complaint   Patient presents with     Anxiety     shakes     Nausea     Hives     Allergy issues. Taking Benadryl, Zyrtec     Shortness of Breath     At times, comes and goes     HPI  Rajendra Gagnon is a 57 year old male with history of prostate cancer, acoustic neuroma status post surgery, transient atrial fibrillation for which he is not anticoagulated who presents to the emergency department today with numerous complaints including cottonmouth, anxiety with shakes, feelings of hot and cold, sore throat, jaw soreness, hives, nausea, numbness, anxiety, palpitations all of which are transient have a been going on for years and that this is worse than normal.  He states he has been taking a lot of Benadryl and fexofenadine which normally helps.  He indicates that he has a rash for which he is has seen dermatology recently.  He has no other complaints at this time.    Allergies:  Allergies   Allergen Reactions     Aspartame Nausea     Venlafaxine Other (See Comments)     Night terrors     Zoloft [Sertraline] Other (See Comments)     Head rush, hives and agitation       Problem List:    Patient Active Problem List    Diagnosis Date Noted     Morbid obesity (H) 01/06/2021     Priority: Medium     Urticaria 01/06/2021     Priority: Medium     Primary malignant neoplasm of prostate (H) 02/14/2019     Priority: Medium     Added automatically from request for surgery [id number]       Adjustment disorder with mixed anxiety and depressed mood 11/14/2017     Priority: Medium     Varicose veins of both lower extremities 05/12/2017     Priority: Medium     ACP (advance care planning) 05/11/2016     Priority: Medium     Advance Care Planning 5/11/2016: ACP Review of Chart / Resources Provided:  Reviewed chart for advance care plan.  Rajendra Gagnon has no plan or code status on file. Discussed available resources and provided with information.   Added by Juan A Vitale             Umbilical hernia without  obstruction and without gangrene 05/11/2016     Priority: Medium     Hypertriglyceridemia      Priority: Medium     History of atrial fibrillation 04/23/2012     Priority: Medium     Dysthymia 03/26/2012     Priority: Medium     Benign neoplasm of cranial nerve (H) 03/14/2012     Priority: Medium     Problem list name updated by automated process. Provider to review       Right acoustic neuroma 03/14/2012     Priority: Medium     Atrial fibrillation (H) 01/01/2012     Priority: Medium        Past Medical History:    Past Medical History:   Diagnosis Date     Dysthymia 3/26/2012     Hearing loss in right ear 3/14/2012     History of atrial fibrillation 4/23/2012     Hypertriglyceridemia      Right acoustic neuroma 3/14/2012     Tinnitus, unspecified 3/14/2012     Umbilical hernia without obstruction and without gangrene 5/11/2016     Varicose veins      Varicose veins of both lower extremities 5/12/2017       Past Surgical History:    Past Surgical History:   Procedure Laterality Date     acustic neuroma removed  3/2007    right     BIOPSY PROSTATE TRANSRECTAL N/A 2/5/2019    Procedure: TRANSRECTAL Ultrasound BIOPSY of PROSTATE;  Surgeon: Filemon Betts MD;  Location: [facility] OR     COLONOSCOPY N/A 1/3/2018    Repeat in 2028//Procedure: COLONOSCOPY;  COLONOSCOPY;  Surgeon: Reggie Ron DO;  Location: [facility] OR     funnel chest[      states had chest reconstruction     HERNIA REPAIR       ORTHOPEDIC SURGERY      right knee surgery     PE TUBES         Family History:    Family History   Problem Relation Age of Onset     Diabetes Mother         type 2     Hypertension Mother      Heart Disease Mother      Lipids Mother      Alcohol/Drug Father      Cancer Maternal Grandmother      Eye Disorder Maternal Grandfather      Heart Disease Maternal Grandfather      Cancer Maternal Grandfather      Cancer Paternal Grandmother      Prostate Cancer Paternal Grandfather      Other - See Comments Daughter         lupus     Kidney  Disease Daughter      Kidney Disease Daughter        Social History:  Marital Status:   [2]  Social History     Tobacco Use     Smoking status: Never Smoker     Smokeless tobacco: Never Used   Substance Use Topics     Alcohol use: Yes     Alcohol/week: 3.3 standard drinks     Types: 4 Cans of beer per week     Comment: rarely     Drug use: No        Medications:    Acetaminophen (TYLENOL PO)  Calcium-Magnesium-Zinc 333-133-5 MG TABS per tablet  cetirizine (ZYRTEC) 10 MG tablet  DiphenhydrAMINE HCl (BENADRYL PO)  EPINEPHrine (ANY BX GENERIC EQUIV) 0.3 MG/0.3ML injection 2-pack  fexofenadine (ALLEGRA) 180 MG tablet  Multiple Vitamins-Minerals (MULTIVITAMIN OR)  triamcinolone (KENALOG) 0.1 % external cream  vitamin B complex with vitamin C (STRESS TAB) tablet  VITAMIN D, CHOLECALCIFEROL, PO  vitamin E 400 units TABS          Review of Systems  A complete review of systems was performed and is otherwise negative.     Physical Exam   BP: 140/93  Pulse: 88  Temp: 98.7  F (37.1  C)  Resp: 18  Weight: 117 kg (258 lb)  SpO2: 96 %      Physical Exam  Constitutional: Alert and conversant. NAD   HENT: NCAT   Eyes: Normal pupils   Neck: supple   CV: Normal rate, regular rhythm, no murmur   Pulmonary/Chest: Non-labored respirations, clear to auscultation bilaterally   Abdominal: Soft, non-tender, non-distended   MSK: MARTIN.   Neuro: Neuro: Alert and appropriate, CN 2-12 intact, Strength 5/5 and symmetric in the upper and lower extremities, SILT, normal rapid alternating movements, normal coordination  Skin: Warm and dry. No diaphoresis. No rashes on exposed skin    Psych: Appropriate mood and affect     ED Course              ED Course as of 07/11/22 1645   Mon Jul 11, 2022   1523 Patient with numerous complaints but overall well-appearing with acceptable vitals.  Nonfocal neuro exam argues against CVA.  Will check electrolytes given his complaints of numbness.  His rash does not have any concerning red flag symptoms and he  is already following up with dermatology.  EKG without signs of acute ischemia or arrhythmia.  Given his complaint of palpitations will certainly watch and monitor telemetry.  The meg is likely from all the Benadry.  He already has plan for testing for allergies.   1644 He states he has been having a lot of stress lately, his symptoms all improved after Ativan, this could certainly be related to that there are no electrolyte dyscrasias or other concerning findings.  He is appropriate for further outpatient management and I discharged in stable condition with all questions answered and return precautions given.  I recommended primary care follow-up in 1 to 2 weeks.     Procedures              Results for orders placed or performed during the hospital encounter of 07/11/22 (from the past 24 hour(s))   Basic metabolic panel   Result Value Ref Range    Sodium 138 133 - 144 mmol/L    Potassium 4.4 3.4 - 5.3 mmol/L    Chloride 107 94 - 109 mmol/L    Carbon Dioxide (CO2) 27 20 - 32 mmol/L    Anion Gap 4 3 - 14 mmol/L    Urea Nitrogen 14 7 - 30 mg/dL    Creatinine 1.15 0.66 - 1.25 mg/dL    Calcium 9.1 8.5 - 10.1 mg/dL    Glucose 94 70 - 99 mg/dL    GFR Estimate 75 >60 mL/min/1.73m2   Magnesium   Result Value Ref Range    Magnesium 2.5 (H) 1.6 - 2.3 mg/dL   Extra Tube    Narrative    The following orders were created for panel order Extra Tube.  Procedure                               Abnormality         Status                     ---------                               -----------         ------                     Extra Blue Top Tube[[id number]]                              In process                 Extra Red Top Tube[[id number]]                               In process                 Extra Purple Top Tube[[id number]]                            In process                   Please view results for these tests on the individual orders.       Medications   LORazepam (ATIVAN) tablet 2 mg (2 mg Oral Given 7/11/22 1512)        Assessments & Plan (with Medical Decision Making)     I have reviewed the nursing notes.    I have reviewed the findings, diagnosis, plan and need for follow up with the patient.      New Prescriptions    No medications on file       Final diagnoses:   Anxiety       7/11/2022   [facility]     Colton Bowser MD  07/11/22 [id number]

## 2022-07-11 NOTE — ED NOTES
Patient states that he is feeling better after ativan.  Discharge instructions reviewed with patient and spouse; both verbalize understanding and have no further questions/conerns.  Patient given note for work; home to rest.

## 2022-07-11 NOTE — ED NOTES
"Patient presents with his wife.  Patient reports having issues with \"allergies\" every year normally in the fall.  Reports that this year his allergy symptoms never really went away.  Patient has a list of concerns with him, hives, nausea, numbness, anxiety shakes, cotton mouth, feeling hot/cold, jaw sore and sore throat; patient states that his symptoms are ok currently but was unable to see his primary.  Rest of assessment as charted.    "

## 2022-07-11 NOTE — DISCHARGE INSTRUCTIONS
Return to the emergency department for worsening symptoms or new concerning symptoms please follow-up with your primary care provider within the next 1 to 2 weeks.  Call to schedule appointment

## 2022-07-11 NOTE — ED TRIAGE NOTES
Patient presents today with allergy issues causing shortness of breath at times, head congestion, anxiety with shakes, hives all over (neck and arms.) He is dizzy and light headed as well. This is ongoing but is getting worse.

## 2022-08-17 ENCOUNTER — OFFICE VISIT (OUTPATIENT)
Dept: ALLERGY | Facility: OTHER | Age: 57
End: 2022-08-17
Attending: PHYSICIAN ASSISTANT
Payer: COMMERCIAL

## 2022-08-17 ENCOUNTER — OFFICE VISIT (OUTPATIENT)
Dept: OTOLARYNGOLOGY | Facility: OTHER | Age: 57
End: 2022-08-17
Attending: PHYSICIAN ASSISTANT
Payer: COMMERCIAL

## 2022-08-17 VITALS
DIASTOLIC BLOOD PRESSURE: 79 MMHG | SYSTOLIC BLOOD PRESSURE: 139 MMHG | TEMPERATURE: 98.9 F | HEART RATE: 93 BPM | WEIGHT: 260 LBS | OXYGEN SATURATION: 98 % | HEIGHT: 74 IN | BODY MASS INDEX: 33.37 KG/M2

## 2022-08-17 DIAGNOSIS — D33.3 BENIGN NEOPLASM OF CRANIAL NERVES (H): ICD-10-CM

## 2022-08-17 DIAGNOSIS — J30.2 PERENNIAL ALLERGIC RHINITIS WITH SEASONAL VARIATION: ICD-10-CM

## 2022-08-17 DIAGNOSIS — J30.89 PERENNIAL ALLERGIC RHINITIS WITH SEASONAL VARIATION: ICD-10-CM

## 2022-08-17 DIAGNOSIS — L50.9 URTICARIA: Primary | ICD-10-CM

## 2022-08-17 DIAGNOSIS — B99.9 RASH OR SKIN ERUPTION ACCOMPANYING INFECTIOUS DISEASE: ICD-10-CM

## 2022-08-17 PROCEDURE — 36415 COLL VENOUS BLD VENIPUNCTURE: CPT | Performed by: PHYSICIAN ASSISTANT

## 2022-08-17 PROCEDURE — 86003 ALLG SPEC IGE CRUDE XTRC EA: CPT | Mod: 90 | Performed by: PHYSICIAN ASSISTANT

## 2022-08-17 PROCEDURE — 95024 IQ TESTS W/ALLERGENIC XTRCS: CPT

## 2022-08-17 PROCEDURE — 95004 PERQ TESTS W/ALRGNC XTRCS: CPT

## 2022-08-17 PROCEDURE — 86618 LYME DISEASE ANTIBODY: CPT | Performed by: PHYSICIAN ASSISTANT

## 2022-08-17 PROCEDURE — 99213 OFFICE O/P EST LOW 20 MIN: CPT | Mod: 25 | Performed by: PHYSICIAN ASSISTANT

## 2022-08-17 ASSESSMENT — PAIN SCALES - GENERAL: PAINLEVEL: NO PAIN (0)

## 2022-08-17 NOTE — PROGRESS NOTES
Chief Complaint   Patient presents with     MQT f/u       Patient presents for MQT and follow up.   He had several positives on examination today and reports     MQT-8/17/2022  Dilution #6-dust  Dilution #5-pigweed,  Birch, [place], Alternaria, cat  Dilution #2-distal, grass, maple, elm, oak, gayle, molds, dog    Today, he presents for ongoing concerns over the last several weeks. He reports symptoms are more common during the fall- winter months. He has no angy correlation between cold, heat, or known exposure.   He has been using daily Zyrtec and  Benadryl every 4-6 hours.   He states he started with hand/ feet itching, chest prickly, shortness of breath. He has been seen in ED for these events which may have been contributed to anxiety/ stress.     He felt symptoms worsened over the last few weeks   January worsened symptoms.   He does not use any lotion/ creams   He has been using dial soap throughout the full year.   No current use of lotions/ creams.   No use of Epipen.   No angy food correlation  Increase in stress.      He has had previous work up in the past with Dr Mota, Dr Lundberg and Dr Boggs for urticaria.  He was allergy tested in April 2009, moderate sensitivities to grass, birch, dust, alternaria, thistle, elm, oak, grain smut and dog,  Low sensitives to ragweed maple, aspergillus, pigweed, kochia, gayle, pine.      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  He was treated with 3 months of keflex.   He has been treated with Zyrtec in the past with improvement in hives/angioedema symptoms.  In reviewing past medical records it was suggested that this could be idiopathic urticaria but definitive diagnosis was not made.  He did have a normal C1 inhibitor in April 2008.        Past Medical History:   Diagnosis Date     Dysthymia 3/26/2012     Hearing loss in right ear 3/14/2012     History of atrial fibrillation 4/23/2012      "Hypertriglyceridemia      Right acoustic neuroma 3/14/2012     Tinnitus, unspecified 3/14/2012     Umbilical hernia without obstruction and without gangrene 5/11/2016     Varicose veins      Varicose veins of both lower extremities 5/12/2017        Allergies   Allergen Reactions     Aspartame Nausea     Venlafaxine Other (See Comments)     Night terrors     Zoloft [Sertraline] Other (See Comments)     Head rush, hives and agitation       Current Outpatient Medications   Medication     Acetaminophen (TYLENOL PO)     Ascorbic Acid (VITAMIN C) 500 MG CHEW     Calcium-Magnesium-Zinc 333-133-5 MG TABS per tablet     EPINEPHrine (ANY BX GENERIC EQUIV) 0.3 MG/0.3ML injection 2-pack     triamcinolone (KENALOG) 0.1 % external cream     cetirizine (ZYRTEC) 10 MG tablet     DiphenhydrAMINE HCl (BENADRYL PO)     fexofenadine (ALLEGRA) 180 MG tablet     Multiple Vitamins-Minerals (MULTIVITAMIN OR)     vitamin B complex with vitamin C (STRESS TAB) tablet     VITAMIN D, CHOLECALCIFEROL, PO     vitamin E 400 units TABS     No current facility-administered medications for this visit.     ROS- SEE HPI  /79 (BP Location: Right arm, Patient Position: Sitting, Cuff Size: Adult Large)   Pulse 93   Temp 98.9  F (37.2  C) (Oral)   Ht 1.88 m (6' 2\")   Wt 117.9 kg (260 lb)   SpO2 98%   BMI 33.38 kg/m      General - The patient is well nourished and well developed, and appears to have good nutritional status.  Alert and oriented to person and place, answers questions and cooperates with examination appropriately.   Head and Face - Normocephalic and atraumatic, with no gross asymmetry noted.  The facial nerve is intact, with strong symmetric movements.  Voice and Breathing - The patient was breathing comfortably without the use of accessory muscles. There was no wheezing, stridor, or stertor.  The patients voice was clear and strong, and had appropriate pitch and quality.  Ears -The external auditory canals are patent, the tympanic " membranes are intact without effusion, retraction or mass.  Bony landmarks are intact.  Eyes - Extraocular movements intact, and the pupils were reactive to light.  Sclera were not icteric or injected, conjunctiva were pink and moist.  Mouth - Examination of the oral cavity showed pink, healthy oral mucosa. No lesions or ulcerations noted.  The tongue was mobile and midline, and the dentition were in good condition.    Throat - The walls of the oropharynx were smooth, pink, moist, symmetric, and had no lesions or ulcerations.  The tonsillar pillars and soft palate were symmetric.  The uvula was midline on elevation.  FTP IIb  Neck - Normal midline excursion of the laryngotracheal complex during swallowing.  Full range of motion on passive movement.  Palpation of the occipital, submental, submandibular, internal jugular chain, and supraclavicular nodes did not demonstrate any abnormal lymph nodes or masses.  Palpation of the thyroid was soft and smooth, with no nodules or goiter appreciated.  The trachea was mobile and midline.  Nose - External contour is symmetric, no gross deflection or scars.  Nasal mucosa is pink and moist with no abnormal mucus.  No polyps, masses, or purulence noted on examination.  Skin- Round papular wheal appearance along hands.   He has two small areas on right forearm appear with cluster of red, round, dusky center.      Impression and Plan- Rajendra Gagnon is a 56 year old male with:    ICD-10-CM    1. Urticaria  L50.9 Food Panel ADULT ([employer])     Lyme Disease Total Abs Bld with Reflex to Confirm CLIA   2. Rash or skin eruption accompanying infectious disease  B99.9 Food Panel ADULT ([employer])     Lyme Disease Total Abs Bld with Reflex to Confirm CLIA   3. Perennial allergic rhinitis with seasonal variation  J30.89     J30.2    4. Right acoustic neuroma  D33.3           Continue with Allegra 180 mg at AM  Continue with Zyrtec at HS    Start Allergy injections.   Check Epipen for  expiration.   Food panel and Lymes test today. They will call with results. (Food takes about 10-14 days)    Reviewed with dorene allergy injections will aid in PAR but may not resolve his urticaria. These could continue to occur. He was recommended to stay on daily AH.        Jeanna Celeste PA-C  ENT  [facility], [place]

## 2022-08-17 NOTE — NURSING NOTE
"Chief Complaint   Patient presents with     MQT f/u       Initial /79 (BP Location: Right arm, Patient Position: Sitting, Cuff Size: Adult Large)   Pulse 93   Temp 98.9  F (37.2  C) (Oral)   Ht 1.88 m (6' 2\")   Wt 117.9 kg (260 lb)   SpO2 98%   BMI 33.38 kg/m   Estimated body mass index is 33.38 kg/m  as calculated from the following:    Height as of this encounter: 1.88 m (6' 2\").    Weight as of this encounter: 117.9 kg (260 lb).  Medication Reconciliation:     Marshall Walker RN    This patient presents today for allergy skin testing.      Symptoms have included h/o hives, usually it was Oct/Nov, but now it has been pretty constant.  He has good weeks and bad weeks, sometimes weeks on end.   He c/o itchy scalp, alms, feet and moves all over.He has symptoms whether he is at home or work.  He also c/o shortness of breath and nasal congestion.  He has needed prednisone in the past.  He has taken Zyrtec and Benadryl and they do help with his symptoms.   Yesterday he had an outbreak of hives and prescribed a short burst of prednisone, to be able to complete testing.  The hives are much improved today.  He had PE tubes as a child x 1.  No past sinus surgeries. No asthma.  He had a tonsillectomy as a child.    This patient lives in a single family home, with a basement.  The home is older.   The basement is wet, so there are concerns for mold.This patient  There is carpet in the home, but not in the bedroom.  Home has forced air heat and a window unit air conditioning sytem.       This patient has no for pets.      He was allergy tested in April 2009, moderate sensitivities to grass, birch, dust, alternaria, thistle, elm, oak, grain smut and dog,  Low sensitives to ragweed maple, aspergillus, pigweed, kochia, gayle, pine.     This patient's medications have been reviewed prior to testing and all appropriate medications have been stopped.    Consent is signed by patient and signature is verified.     MQT/ID test is " performed per protocol.  The patient tolerated testing well.  Benadryl gel used for post test itch per protocol.  Chewable Claritin 10 mg given for post test itch per protocol.  All findings are recorded on the paper flow sheet. Results are reviewed with this patient.  They are given written information regarding allergy.       The patient will follow-up with Jeanna Celeste PA-C for treatment plan.        Marshall Walker RN on 8/17/2022 at 3:12 PM

## 2022-08-17 NOTE — LETTER
8/17/2022         RE: Rajendra Gagnon  [address]  [place] [address]        Dear Colleague,    Thank you for referring your patient, Rajendra Gagnon, to the [facility]. Please see a copy of my visit note below.    Chief Complaint   Patient presents with     MQT f/u       Patient presents for MQT and follow up.   He had several positives on examination today and reports     MQT-8/17/2022  Dilution #6-dust  Dilution #5-pigweed,  Birch, [place], Alternaria, cat  Dilution #2-distal, grass, maple, elm, oak, gayle, molds, dog    Today, he presents for ongoing concerns over the last several weeks. He reports symptoms are more common during the fall- winter months. He has no angy correlation between cold, heat, or known exposure.   He has been using daily Zyrtec and  Benadryl every 4-6 hours.   He states he started with hand/ feet itching, chest prickly, shortness of breath. He has been seen in ED for these events which may have been contributed to anxiety/ stress.     He felt symptoms worsened over the last few weeks   January worsened symptoms.   He does not use any lotion/ creams   He has been using dial soap throughout the full year.   No current use of lotions/ creams.   No use of Epipen.   No angy food correlation  Increase in stress.      He has had previous work up in the past with Dr Mota, Dr Lundberg and Dr Boggs for urticaria.  He was allergy tested in April 2009, moderate sensitivities to grass, birch, dust, alternaria, thistle, elm, oak, grain smut and dog,  Low sensitives to ragweed maple, aspergillus, pigweed, kochia, gayle, pine.      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  He was treated with 3 months of keflex.   He has been treated with Zyrtec in the past with improvement in hives/angioedema symptoms.  In reviewing past medical records it was suggested that this could be idiopathic  "urticaria but definitive diagnosis was not made.  He did have a normal C1 inhibitor in April 2008.        Past Medical History:   Diagnosis Date     Dysthymia 3/26/2012     Hearing loss in right ear 3/14/2012     History of atrial fibrillation 4/23/2012     Hypertriglyceridemia      Right acoustic neuroma 3/14/2012     Tinnitus, unspecified 3/14/2012     Umbilical hernia without obstruction and without gangrene 5/11/2016     Varicose veins      Varicose veins of both lower extremities 5/12/2017        Allergies   Allergen Reactions     Aspartame Nausea     Venlafaxine Other (See Comments)     Night terrors     Zoloft [Sertraline] Other (See Comments)     Head rush, hives and agitation       Current Outpatient Medications   Medication     Acetaminophen (TYLENOL PO)     Ascorbic Acid (VITAMIN C) 500 MG CHEW     Calcium-Magnesium-Zinc 333-133-5 MG TABS per tablet     EPINEPHrine (ANY BX GENERIC EQUIV) 0.3 MG/0.3ML injection 2-pack     triamcinolone (KENALOG) 0.1 % external cream     cetirizine (ZYRTEC) 10 MG tablet     DiphenhydrAMINE HCl (BENADRYL PO)     fexofenadine (ALLEGRA) 180 MG tablet     Multiple Vitamins-Minerals (MULTIVITAMIN OR)     vitamin B complex with vitamin C (STRESS TAB) tablet     VITAMIN D, CHOLECALCIFEROL, PO     vitamin E 400 units TABS     No current facility-administered medications for this visit.     ROS- SEE HPI  /79 (BP Location: Right arm, Patient Position: Sitting, Cuff Size: Adult Large)   Pulse 93   Temp 98.9  F (37.2  C) (Oral)   Ht 1.88 m (6' 2\")   Wt 117.9 kg (260 lb)   SpO2 98%   BMI 33.38 kg/m      General - The patient is well nourished and well developed, and appears to have good nutritional status.  Alert and oriented to person and place, answers questions and cooperates with examination appropriately.   Head and Face - Normocephalic and atraumatic, with no gross asymmetry noted.  The facial nerve is intact, with strong symmetric movements.  Voice and Breathing - The " patient was breathing comfortably without the use of accessory muscles. There was no wheezing, stridor, or stertor.  The patients voice was clear and strong, and had appropriate pitch and quality.  Ears -The external auditory canals are patent, the tympanic membranes are intact without effusion, retraction or mass.  Bony landmarks are intact.  Eyes - Extraocular movements intact, and the pupils were reactive to light.  Sclera were not icteric or injected, conjunctiva were pink and moist.  Mouth - Examination of the oral cavity showed pink, healthy oral mucosa. No lesions or ulcerations noted.  The tongue was mobile and midline, and the dentition were in good condition.    Throat - The walls of the oropharynx were smooth, pink, moist, symmetric, and had no lesions or ulcerations.  The tonsillar pillars and soft palate were symmetric.  The uvula was midline on elevation.  FTP IIb  Neck - Normal midline excursion of the laryngotracheal complex during swallowing.  Full range of motion on passive movement.  Palpation of the occipital, submental, submandibular, internal jugular chain, and supraclavicular nodes did not demonstrate any abnormal lymph nodes or masses.  Palpation of the thyroid was soft and smooth, with no nodules or goiter appreciated.  The trachea was mobile and midline.  Nose - External contour is symmetric, no gross deflection or scars.  Nasal mucosa is pink and moist with no abnormal mucus.  No polyps, masses, or purulence noted on examination.  Skin- Round papular wheal appearance along hands.   He has two small areas on right forearm appear with cluster of red, round, dusky center.      Impression and Plan- Rajendra Gagnon is a 56 year old male with:    ICD-10-CM    1. Urticaria  L50.9 Food Panel ADULT ([employer])     Lyme Disease Total Abs Bld with Reflex to Confirm CLIA   2. Rash or skin eruption accompanying infectious disease  B99.9 Food Panel ADULT ([employer])     Lyme Disease Total Abs Bld with Reflex  to Confirm CLIA   3. Perennial allergic rhinitis with seasonal variation  J30.89     J30.2    4. Right acoustic neuroma  D33.3           Continue with Allegra 180 mg at AM  Continue with Zyrtec at HS    Start Allergy injections.   Check Epipen for expiration.   Food panel and Lymes test today. They will call with results. (Food takes about 10-14 days)    Reviewed with dorene allergy injections will aid in PAR but may not resolve his urticaria. These could continue to occur. He was recommended to stay on daily AH.        Jeanna Celeste PA-C  ENT  [facility] [place]          Again, thank you for allowing me to participate in the care of your patient.        Sincerely,        Jeanna Celeste PA-C

## 2022-08-17 NOTE — PATIENT INSTRUCTIONS
Continue with Allegra 180 mg at AM  Continue with Zyrtec at bedtime    Start Allergy injections.   Check Epipen for expiration.   Food panel and Lymes test today. They will call with results. (Food takes about 10-14 days)      Thank you for allowing Jeanna Celeste PA-C and our ENT team to participate in your care.  If your medications are too expensive, please give the nurse a call.  We can possibly change this medication.  If you have a scheduling or an appointment question please contact our Health Unit Coordinator at [phone], Ext. [phone].    ALL nursing questions or concerns can be directed to your ENT nurse at: [phone] [place]

## 2022-08-17 NOTE — PROGRESS NOTES
Rajendra was seen for allergy skin testing. Patient was seen by this nurse in conjunction with ENT provider. All encounter details are documented in ENT Provider's appointment from this same date. Please see referenced encounter for this visits documentation.     Marshall Walker RN on 8/17/2022 at 3:11 PM

## 2022-08-18 ENCOUNTER — OFFICE VISIT (OUTPATIENT)
Dept: CHIROPRACTIC MEDICINE | Facility: OTHER | Age: 57
End: 2022-08-18
Attending: CHIROPRACTOR
Payer: COMMERCIAL

## 2022-08-18 DIAGNOSIS — M99.01 SEGMENTAL AND SOMATIC DYSFUNCTION OF CERVICAL REGION: Primary | ICD-10-CM

## 2022-08-18 DIAGNOSIS — M99.02 SEGMENTAL AND SOMATIC DYSFUNCTION OF THORACIC REGION: ICD-10-CM

## 2022-08-18 DIAGNOSIS — M54.2 CERVICALGIA: ICD-10-CM

## 2022-08-18 PROCEDURE — 98940 CHIROPRACT MANJ 1-2 REGIONS: CPT | Mod: AT | Performed by: CHIROPRACTOR

## 2022-08-18 PROCEDURE — 99202 OFFICE O/P NEW SF 15 MIN: CPT | Mod: 25 | Performed by: CHIROPRACTOR

## 2022-08-19 LAB — B BURGDOR IGG+IGM SER QL: 0.49

## 2022-08-22 ENCOUNTER — TELEPHONE (OUTPATIENT)
Dept: ALLERGY | Facility: OTHER | Age: 57
End: 2022-08-22

## 2022-08-22 DIAGNOSIS — J30.2 PERENNIAL ALLERGIC RHINITIS WITH SEASONAL VARIATION: Primary | ICD-10-CM

## 2022-08-22 DIAGNOSIS — J30.89 PERENNIAL ALLERGIC RHINITIS WITH SEASONAL VARIATION: Primary | ICD-10-CM

## 2022-08-22 NOTE — TELEPHONE ENCOUNTER
Patient will be starting SCIT (allergy shots).  New order pended for review and signature.  Thank you!    Marshall Walker RN on 8/22/2022 at 9:31 AM

## 2022-08-25 ENCOUNTER — ALLIED HEALTH/NURSE VISIT (OUTPATIENT)
Dept: ALLERGY | Facility: OTHER | Age: 57
End: 2022-08-25
Attending: PHYSICIAN ASSISTANT
Payer: COMMERCIAL

## 2022-08-25 DIAGNOSIS — J30.89 PERENNIAL ALLERGIC RHINITIS WITH SEASONAL VARIATION: Primary | ICD-10-CM

## 2022-08-25 DIAGNOSIS — J30.2 PERENNIAL ALLERGIC RHINITIS WITH SEASONAL VARIATION: Primary | ICD-10-CM

## 2022-08-25 PROCEDURE — 95165 ANTIGEN THERAPY SERVICES: CPT | Performed by: PHYSICIAN ASSISTANT

## 2022-08-25 NOTE — PROGRESS NOTES
Allergy serum is mixed today at [place] schedule 1 of 4,  into  2  (5 ml)  multi dose vial/vials.    Allergens included were:    Ragweed  0.2 ml of dilution # 0  Pigweed  0.2 ml of dilution # 3  Mugwort 0.2 ml of dilution # 0  Kochia  0.2 ml of dilution # 0  Russian Thistle 0.2 ml of dilution # 2  Saurav Grass 0.2 ml of dilution # 2  Birch mix 0.2 ml of dilution # 2  Maple Mix 0.2 of dilution # 2  Elm Mix 0.2 ml of dilution # 2  Oak Mix 0.2 ml of dilution # 2  [place]baldo Mix 0.2 ml of dilution # 2  Pine Mix 0.2 ml of dilution # 2  [place] 0.2 ml of dilution # 0  Black [place] 0.2 ml of dilution # 2  Aspen 0.2 ml of dilution # 0  Red [place] 0.2 ml of dilution # 0    Alternaria 0.2 ml of dilution # 4  Aspergillus 0.2 ml of dilution # 2  Hormodendrum 0.2 ml of dilution # 2  Helminthosporium 0.2 ml of dilution # 0  Penicillium 0.2 ml of dilution # 2  Epicoccum 0.2 ml of dilution # 2  Fusarium 0.2 ml of dilution # 2  Mucor 0.2 ml of dilution # 0  Grain Smut 0.2 ml of dilution # 0  Grass Smut 0.2 ml of dilution # 0  Cat 0.2 ml of dilution # 2  Dog 0.2 ml of dilution # 2  Feather Mix 0.2 ml of dilution # 0  Dust Mite Mix 0.2 ml of dilution # 4  Horse 0.2 ml of dilution # 0    Marshall Wlaker RN on 8/25/2022 at 10:22 AM

## 2022-08-30 NOTE — PROGRESS NOTES
Subjective Finding:    Chief compalint: Patient presents with:  Neck Pain  , Pain Scale: 6/10, Intensity: sharp, Duration: 1 months, Radiating:  no.    Date of injury:     Activities that the pain restricts:   Home/household/hobbies/social activities: yes.  Work duties: yes.  Sleep: yes.  Makes symptoms better: rest.  Makes symptoms worse: activity, cervical extension and cervical flexion.  Have you seen anyone else for the symptoms? No.  Work related: no.  Automobile related injury: no.    Objective and Assessment:    Posture Analysis:   High shoulder: .  Head tilt: .  High iliac crest: .  Head carriage: forward.  Thoracic Kyphosis: neutral.  Lumbar Lordosis: neutral.    Lumbar Range of Motion: .  Cervical Range of Motion: extension decreased.  Thoracic Range of Motion: .  Extremity Range of Motion: .    Palpation:       Segmental dysfunction pre-treatment and treatment area: C2, C3, C6 and T3.    Assessment post-treatment:  Cervical: ROM increased.  Thoracic: ROM increased.  Lumbar: .    Comments: .      Complicating Factors: .    Procedure(s):  CMT:  17900 Chiropractic manipulative treatment 1-2 regions performed   Cervical: Diversified, See above for level, Supine and Thoracic: Diversified, See above for level, Prone    Modalities:  None performed this visit    Therapeutic procedures:  None    Plan:  Treatment plan: 2 times per week for 2 weeks.  Instructed patient: stretch as instructed at visit.  Short term goals: reduce pain.  Long term goals: restore normal function.  Prognosis: excellent.

## 2022-09-04 ENCOUNTER — HEALTH MAINTENANCE LETTER (OUTPATIENT)
Age: 57
End: 2022-09-04

## 2022-09-06 LAB — SCANNED LAB RESULT: NORMAL

## 2022-09-14 DIAGNOSIS — T78.40XS ALLERGIC REACTION, SEQUELA: Primary | ICD-10-CM

## 2022-09-14 RX ORDER — EPINEPHRINE 0.3 MG/.3ML
0.3 INJECTION SUBCUTANEOUS
Qty: 1 EACH | Refills: 3 | Status: SHIPPED | OUTPATIENT
Start: 2022-09-14 | End: 2024-04-02

## 2022-09-14 NOTE — TELEPHONE ENCOUNTER
epinephrine      Last Written Prescription Date:  12/14/2020  Last Fill Quantity: 1,   # refills: 3  Last Office Visit: 5/11/2021  Future Office visit:    Next 5 appointments (look out 90 days)    Sep 21, 2022  1:45 PM  Office Visit with [facility] ALLERGY TESTING  [facility] ([facility] ) [address]  [place] [place] [facility] [address]  [phone]   Sep 28, 2022  2:45 PM  (Arrive by 2:30 PM)  PHYSICAL with Bhupinder [facility]  [facility] ([facility] ) [address] [facility] [address]  [phone]           Routing refill request to provider for review/approval because:

## 2022-09-21 ENCOUNTER — OFFICE VISIT (OUTPATIENT)
Dept: ALLERGY | Facility: OTHER | Age: 57
End: 2022-09-21
Attending: PHYSICIAN ASSISTANT
Payer: COMMERCIAL

## 2022-09-21 DIAGNOSIS — J30.2 PERENNIAL ALLERGIC RHINITIS WITH SEASONAL VARIATION: Primary | ICD-10-CM

## 2022-09-21 DIAGNOSIS — J30.89 PERENNIAL ALLERGIC RHINITIS WITH SEASONAL VARIATION: Primary | ICD-10-CM

## 2022-09-21 PROCEDURE — 95117 IMMUNOTHERAPY INJECTIONS: CPT

## 2022-09-21 NOTE — PROGRESS NOTES
Prior to injection patient identity verified using name and date of birth.    SVT done on right arm, measuring 7 mm.  Passed  SVT done on left arm, measuring 7 mm.  Passed     Patient presents to allergy clinic for education and training on subcutaneous immunotherapy.  Patient educated on epi pen and an indications for use.  Patient did a return demonstration. Patient verbalizes understanding.  New patient information sheet given and discussed anaphylaxis, local reactions and answered all questions to patients satisfaction.  Patient is aware that he/she will need a an allergy follow up in 6 months.    Allergy injection/s given and charted on paper allergy flow sheet.  Patient experienced no reaction.  Epi pen is present today.       Marshall Walker RN on 9/21/2022 at 2:48 PM

## 2022-09-28 ENCOUNTER — LAB (OUTPATIENT)
Dept: LAB | Facility: OTHER | Age: 57
End: 2022-09-28
Attending: PHYSICIAN ASSISTANT
Payer: COMMERCIAL

## 2022-09-28 ENCOUNTER — OFFICE VISIT (OUTPATIENT)
Dept: FAMILY MEDICINE | Facility: OTHER | Age: 57
End: 2022-09-28
Attending: FAMILY MEDICINE
Payer: COMMERCIAL

## 2022-09-28 ENCOUNTER — ALLIED HEALTH/NURSE VISIT (OUTPATIENT)
Dept: ALLERGY | Facility: OTHER | Age: 57
End: 2022-09-28
Attending: PHYSICIAN ASSISTANT
Payer: COMMERCIAL

## 2022-09-28 VITALS
DIASTOLIC BLOOD PRESSURE: 83 MMHG | WEIGHT: 262 LBS | RESPIRATION RATE: 20 BRPM | HEART RATE: 89 BPM | SYSTOLIC BLOOD PRESSURE: 112 MMHG | BODY MASS INDEX: 33.64 KG/M2 | TEMPERATURE: 97.1 F | OXYGEN SATURATION: 98 %

## 2022-09-28 DIAGNOSIS — Z23 NEED FOR VACCINATION: ICD-10-CM

## 2022-09-28 DIAGNOSIS — J30.2 PERENNIAL ALLERGIC RHINITIS WITH SEASONAL VARIATION: Primary | ICD-10-CM

## 2022-09-28 DIAGNOSIS — L50.9 HIVES: ICD-10-CM

## 2022-09-28 DIAGNOSIS — E66.01 MORBID OBESITY (H): ICD-10-CM

## 2022-09-28 DIAGNOSIS — F41.1 GAD (GENERALIZED ANXIETY DISORDER): ICD-10-CM

## 2022-09-28 DIAGNOSIS — Z00.00 ROUTINE GENERAL MEDICAL EXAMINATION AT A HEALTH CARE FACILITY: Primary | ICD-10-CM

## 2022-09-28 DIAGNOSIS — L50.9 URTICARIA: ICD-10-CM

## 2022-09-28 DIAGNOSIS — E78.1 HYPERTRIGLYCERIDEMIA: ICD-10-CM

## 2022-09-28 DIAGNOSIS — C61 PRIMARY MALIGNANT NEOPLASM OF PROSTATE (H): ICD-10-CM

## 2022-09-28 DIAGNOSIS — J30.89 PERENNIAL ALLERGIC RHINITIS WITH SEASONAL VARIATION: Primary | ICD-10-CM

## 2022-09-28 LAB
ALBUMIN SERPL-MCNC: 4.1 G/DL (ref 3.4–5)
ALP SERPL-CCNC: 68 U/L (ref 40–150)
ALT SERPL W P-5'-P-CCNC: 28 U/L (ref 0–70)
ANION GAP SERPL CALCULATED.3IONS-SCNC: 5 MMOL/L (ref 3–14)
AST SERPL W P-5'-P-CCNC: 21 U/L (ref 0–45)
BASOPHILS # BLD AUTO: 0 10E3/UL (ref 0–0.2)
BASOPHILS NFR BLD AUTO: 0 %
BILIRUB SERPL-MCNC: 0.9 MG/DL (ref 0.2–1.3)
BUN SERPL-MCNC: 18 MG/DL (ref 7–30)
CALCIUM SERPL-MCNC: 8.5 MG/DL (ref 8.5–10.1)
CHLORIDE BLD-SCNC: 105 MMOL/L (ref 94–109)
CHOLEST SERPL-MCNC: 167 MG/DL
CO2 SERPL-SCNC: 27 MMOL/L (ref 20–32)
CREAT SERPL-MCNC: 1.11 MG/DL (ref 0.66–1.25)
EOSINOPHIL # BLD AUTO: 0.1 10E3/UL (ref 0–0.7)
EOSINOPHIL NFR BLD AUTO: 2 %
ERYTHROCYTE [DISTWIDTH] IN BLOOD BY AUTOMATED COUNT: 13 % (ref 10–15)
FASTING STATUS PATIENT QL REPORTED: YES
GFR SERPL CREATININE-BSD FRML MDRD: 78 ML/MIN/1.73M2
GLUCOSE BLD-MCNC: 86 MG/DL (ref 70–99)
HCT VFR BLD AUTO: 44 % (ref 40–53)
HDLC SERPL-MCNC: 32 MG/DL
HGB BLD-MCNC: 15.2 G/DL (ref 13.3–17.7)
IMM GRANULOCYTES # BLD: 0 10E3/UL
IMM GRANULOCYTES NFR BLD: 0 %
LDLC SERPL CALC-MCNC: 103 MG/DL
LYMPHOCYTES # BLD AUTO: 1.8 10E3/UL (ref 0.8–5.3)
LYMPHOCYTES NFR BLD AUTO: 25 %
MCH RBC QN AUTO: 30.3 PG (ref 26.5–33)
MCHC RBC AUTO-ENTMCNC: 34.5 G/DL (ref 31.5–36.5)
MCV RBC AUTO: 88 FL (ref 78–100)
MONOCYTES # BLD AUTO: 0.5 10E3/UL (ref 0–1.3)
MONOCYTES NFR BLD AUTO: 7 %
NEUTROPHILS # BLD AUTO: 4.6 10E3/UL (ref 1.6–8.3)
NEUTROPHILS NFR BLD AUTO: 66 %
NONHDLC SERPL-MCNC: 135 MG/DL
NRBC # BLD AUTO: 0 10E3/UL
NRBC BLD AUTO-RTO: 0 /100
PLATELET # BLD AUTO: 202 10E3/UL (ref 150–450)
POTASSIUM BLD-SCNC: 4 MMOL/L (ref 3.4–5.3)
PROT SERPL-MCNC: 7.5 G/DL (ref 6.8–8.8)
PSA SERPL-MCNC: <0.01 UG/L (ref 0–4)
RBC # BLD AUTO: 5.02 10E6/UL (ref 4.4–5.9)
SODIUM SERPL-SCNC: 137 MMOL/L (ref 133–144)
TRIGL SERPL-MCNC: 162 MG/DL
TSH SERPL DL<=0.005 MIU/L-ACNC: 2.03 MU/L (ref 0.4–4)
WBC # BLD AUTO: 7.1 10E3/UL (ref 4–11)

## 2022-09-28 PROCEDURE — 99396 PREV VISIT EST AGE 40-64: CPT | Performed by: FAMILY MEDICINE

## 2022-09-28 PROCEDURE — 84153 ASSAY OF PSA TOTAL: CPT

## 2022-09-28 PROCEDURE — 36415 COLL VENOUS BLD VENIPUNCTURE: CPT

## 2022-09-28 PROCEDURE — 80050 GENERAL HEALTH PANEL: CPT

## 2022-09-28 PROCEDURE — 95117 IMMUNOTHERAPY INJECTIONS: CPT

## 2022-09-28 PROCEDURE — 80061 LIPID PANEL: CPT

## 2022-09-28 RX ORDER — FEXOFENADINE HCL 180 MG/1
180 TABLET ORAL DAILY
Qty: 90 TABLET | Refills: 3 | Status: SHIPPED | OUTPATIENT
Start: 2022-09-28 | End: 2024-04-02

## 2022-09-28 RX ORDER — CETIRIZINE HYDROCHLORIDE 10 MG/1
10 TABLET ORAL DAILY
Qty: 90 TABLET | Refills: 3 | Status: SHIPPED | OUTPATIENT
Start: 2022-09-28 | End: 2023-05-15 | Stop reason: DRUGHIGH

## 2022-09-28 ASSESSMENT — ENCOUNTER SYMPTOMS
FREQUENCY: 0
JOINT SWELLING: 0
HEMATOCHEZIA: 1
NERVOUS/ANXIOUS: 1
PARESTHESIAS: 0
COUGH: 1
DIARRHEA: 0
CONSTIPATION: 1
HEARTBURN: 0
MYALGIAS: 1
ARTHRALGIAS: 1
HEMATURIA: 0
SORE THROAT: 0
SHORTNESS OF BREATH: 0
PALPITATIONS: 0
EYE PAIN: 0
NAUSEA: 0
FEVER: 0
CHILLS: 0
DIZZINESS: 0
ABDOMINAL PAIN: 0
WEAKNESS: 0
HEADACHES: 0
DYSURIA: 0

## 2022-09-28 ASSESSMENT — ANXIETY QUESTIONNAIRES
IF YOU CHECKED OFF ANY PROBLEMS ON THIS QUESTIONNAIRE, HOW DIFFICULT HAVE THESE PROBLEMS MADE IT FOR YOU TO DO YOUR WORK, TAKE CARE OF THINGS AT HOME, OR GET ALONG WITH OTHER PEOPLE: NOT DIFFICULT AT ALL
GAD7 TOTAL SCORE: 6
2. NOT BEING ABLE TO STOP OR CONTROL WORRYING: NOT AT ALL
4. TROUBLE RELAXING: SEVERAL DAYS
7. FEELING AFRAID AS IF SOMETHING AWFUL MIGHT HAPPEN: SEVERAL DAYS
GAD7 TOTAL SCORE: 6
3. WORRYING TOO MUCH ABOUT DIFFERENT THINGS: SEVERAL DAYS
1. FEELING NERVOUS, ANXIOUS, OR ON EDGE: SEVERAL DAYS
5. BEING SO RESTLESS THAT IT IS HARD TO SIT STILL: SEVERAL DAYS
6. BECOMING EASILY ANNOYED OR IRRITABLE: SEVERAL DAYS

## 2022-09-28 ASSESSMENT — PAIN SCALES - GENERAL: PAINLEVEL: NO PAIN (0)

## 2022-09-28 ASSESSMENT — PATIENT HEALTH QUESTIONNAIRE - PHQ9: SUM OF ALL RESPONSES TO PHQ QUESTIONS 1-9: 5

## 2022-09-28 NOTE — PROGRESS NOTES
Prior to injection verified pt identity using pt name and date of birth.    Allergy injection/s given and charted on paper allergy flow sheet.  Patient left AMA, signing form, not staying for the observation period.    Ce Vitale RN on 9/28/2022 at 2:43 PM

## 2022-09-28 NOTE — NURSING NOTE
"Chief Complaint   Patient presents with     Physical       Initial /83 (BP Location: Right arm, Patient Position: Sitting, Cuff Size: Adult Large)   Pulse 89   Temp 97.1  F (36.2  C) (Tympanic)   Resp 20   Wt 118.8 kg (262 lb)   SpO2 98%   BMI 33.64 kg/m   Estimated body mass index is 33.64 kg/m  as calculated from the following:    Height as of 8/17/22: 1.88 m (6' 2\").    Weight as of this encounter: 118.8 kg (262 lb).  Medication Reconciliation: complete  Theresa Rajput LPN  "
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
## 2022-10-07 ENCOUNTER — ALLIED HEALTH/NURSE VISIT (OUTPATIENT)
Dept: ALLERGY | Facility: OTHER | Age: 57
End: 2022-10-07
Attending: PHYSICIAN ASSISTANT
Payer: COMMERCIAL

## 2022-10-07 DIAGNOSIS — J30.89 PERENNIAL ALLERGIC RHINITIS WITH SEASONAL VARIATION: Primary | ICD-10-CM

## 2022-10-07 DIAGNOSIS — J30.2 PERENNIAL ALLERGIC RHINITIS WITH SEASONAL VARIATION: Primary | ICD-10-CM

## 2022-10-07 PROCEDURE — 95117 IMMUNOTHERAPY INJECTIONS: CPT

## 2022-10-07 NOTE — PROGRESS NOTES
Prior to injection verified pt identity using pt name and date of birth.    Allergy injection/s given and charted on paper allergy flow sheet.  Patient left AMA, signing form, not staying for the observation period.    Marshall Walker RN on 10/7/2022 at 4:26 PM

## 2022-10-10 ENCOUNTER — NURSE TRIAGE (OUTPATIENT)
Dept: FAMILY MEDICINE | Facility: OTHER | Age: 57
End: 2022-10-10

## 2022-10-10 NOTE — TELEPHONE ENCOUNTER
"S- (SITUATION) :  Patient called with sores on right side of face.    B- (BACKGROUND):  Present for the past couple months.    A- (ASSESSMENT):  4-5 sores, no fever, no red streaks    R- (RECOMMENDATIONS):  Per protocol patient to be seen in 24 hours. Patient scheduled to see covering provider 10/11/22. Nurse advised patient to call back with any new or worsening symptoms. Patient verbalized understanding.      Reason for Disposition    [1] Unexplained sores AND [2] 3 or more    Additional Information    Negative: Sounds like a life-threatening emergency to the triager    Negative: Followed an injury (i.e., from an abrasion or scratch)    Negative: Wound infection suspected (in traumatic or surgical wound)    Negative: Soft yellow scabs (crusts)    Negative: Followed a burn    Negative: Looks like insect bite    Negative: Looks like chickenpox    Negative: On one side of the lip    Negative: Looks like poison ivy    Negative: Looks like ringworm    Negative: Patient sounds very sick or weak to the triager    Negative: [1] Red area or streak AND [2] fever    Negative: [1] Looks infected (spreading redness, pus) AND [2] large red area (> 2 in. or 5 cm)    Negative: [1] Looks infected (spreading redness, pus) AND [2] diabetes mellitus or weak immune system (e.g., HIV positive, cancer chemo, splenectomy, organ transplant, chronic steroids)    Negative: [1] Red streak runs from sore AND [2] longer than 1 inch (2.5 cm)    Negative: [1] Ulcer with black scab AND [2] spreading AND [3] painless AND [4] cause unknown    Negative: [1] Small red streak or spreading redness (< 2 inches or 5 cm) AND [2] no fever    Answer Assessment - Initial Assessment Questions  1. APPEARANCE of SORES: \"What do the sores look like?\"     Multiple sores on right side of face on cheek.  2. NUMBER: \"How many sores are there?\"      Four or five  3. SIZE: \"How big is the largest sore?\"      \"top of a stick pen\"  4. LOCATION: \"Where are the sores " "located?\"      Right side of face on cheek  5. ONSET: \"When did the sores begin?\"      A couple months ago  6. CAUSE: \"What do you think is causing the sores?\"      unsure  7. OTHER SYMPTOMS: \"Do you have any other symptoms?\" (e.g., fever, new weakness)      no    Protocols used: SORES-A-AH      "

## 2022-10-10 NOTE — PROGRESS NOTES
Assessment & Plan     Rosacea  Classic findings on nose and cheek and presentation with duration, waxing/waning nature.  No signs of bacterial infection, acne, or folliculitis.  Does not appear to have a butterfly rash.  Recommend twice per day application of metronidazole gel.  Reviewed supportive care and good skin hygiene.  Handout given on rosacea.  Would expect improvement in 2 to 4 weeks, full results likely closer to 8 weeks.  If not improving or worsening over the next few weeks, would recommend returning for reevaluation.  If not responsive to metronidazole, could consider ALLA testing, as his daughter does have lupus.  - metroNIDAZOLE (METROGEL) 0.75 % external gel; Apply topically 2 times daily    Need for vaccination  - TDAP VACCINE (Adacel, Boostrix)  [[id number]]  - INFLUENZA VACCINE IM > 6 MONTHS VALENT IIV4 (AFLURIA/FLUZONE)    Need for prophylactic vaccination and inoculation against influenza  Given today.     Return if symptoms worsen or fail to improve.    Sapna Das MD  [facility] - [place] is a 57 year old, presenting for the following health issues:  Derm Problem and Imm/Inj (Flu Shot)      HPI     Rash  Onset/Duration: 5/6 weeks  Description  Location: left check and nose  Character: raised, red  Itching: mild  Intensity:  mild  Progression of Symptoms:  worsening  Accompanying signs and symptoms:   Fever: No  Body aches or joint pain: No  Sore throat symptoms: No  Recent cold symptoms: YES- Had a cold last week  History:           Previous episodes of similar rash: Nose  New exposures:  None  Recent travel: No  Exposure to similar rash: No  Precipitating or alleviating factors: None  Therapies tried and outcome: rubbing alcohol, uneffective. Warm water, uneffective. Triple antibiotic ointment, uneffective.    Always light acne - never goes away, has had since teenager  Has had issues on nose for two years - redness with occasional bumps  More spots on  "cheeks over last few weeks  Starts as red bump, mildly pruritic but no pain  Will get bigger and drain - like a zit   Seems different for drainage - not pussy, mostly clear   Hot wash cloth will open pore   Not provoked by shaving  Rare sun exposure, not associated with alcohol   Unsure if related to foods  Daughter has lupus   No fever or chills         Objective    /78 (BP Location: Left arm, Patient Position: Sitting, Cuff Size: Adult Large)   Pulse 78   Temp 98  F (36.7  C) (Tympanic)   Ht 1.88 m (6' 2\")   Wt 125 kg (275 lb 8 oz)   SpO2 97%   BMI 35.37 kg/m    Body mass index is 35.37 kg/m .     Physical Exam  Constitutional:       General: He is not in acute distress.     Appearance: Normal appearance. He is not ill-appearing.   Eyes:      Conjunctiva/sclera: Conjunctivae normal.   Skin:     General: Skin is warm and dry.      Comments: Erythema over nose, cheeks with erythematous papules, one larger papule on right cheek with excoriation   Neurological:      General: No focal deficit present.      Mental Status: He is alert and oriented to person, place, and time.                              "

## 2022-10-11 ENCOUNTER — OFFICE VISIT (OUTPATIENT)
Dept: FAMILY MEDICINE | Facility: OTHER | Age: 57
End: 2022-10-11
Attending: STUDENT IN AN ORGANIZED HEALTH CARE EDUCATION/TRAINING PROGRAM
Payer: COMMERCIAL

## 2022-10-11 VITALS
HEART RATE: 78 BPM | DIASTOLIC BLOOD PRESSURE: 78 MMHG | OXYGEN SATURATION: 97 % | HEIGHT: 74 IN | WEIGHT: 275.5 LBS | SYSTOLIC BLOOD PRESSURE: 108 MMHG | TEMPERATURE: 98 F | BODY MASS INDEX: 35.36 KG/M2

## 2022-10-11 DIAGNOSIS — Z23 NEED FOR PROPHYLACTIC VACCINATION AND INOCULATION AGAINST INFLUENZA: ICD-10-CM

## 2022-10-11 DIAGNOSIS — Z23 NEED FOR VACCINATION: ICD-10-CM

## 2022-10-11 DIAGNOSIS — L71.9 ROSACEA: Primary | ICD-10-CM

## 2022-10-11 PROBLEM — F43.23 ADJUSTMENT DISORDER WITH MIXED ANXIETY AND DEPRESSED MOOD: Chronic | Status: ACTIVE | Noted: 2017-11-14

## 2022-10-11 PROCEDURE — 90472 IMMUNIZATION ADMIN EACH ADD: CPT | Performed by: STUDENT IN AN ORGANIZED HEALTH CARE EDUCATION/TRAINING PROGRAM

## 2022-10-11 PROCEDURE — 90471 IMMUNIZATION ADMIN: CPT | Performed by: STUDENT IN AN ORGANIZED HEALTH CARE EDUCATION/TRAINING PROGRAM

## 2022-10-11 PROCEDURE — 90682 RIV4 VACC RECOMBINANT DNA IM: CPT | Performed by: STUDENT IN AN ORGANIZED HEALTH CARE EDUCATION/TRAINING PROGRAM

## 2022-10-11 PROCEDURE — 99213 OFFICE O/P EST LOW 20 MIN: CPT | Mod: 25 | Performed by: STUDENT IN AN ORGANIZED HEALTH CARE EDUCATION/TRAINING PROGRAM

## 2022-10-11 PROCEDURE — 90715 TDAP VACCINE 7 YRS/> IM: CPT | Performed by: STUDENT IN AN ORGANIZED HEALTH CARE EDUCATION/TRAINING PROGRAM

## 2022-10-11 RX ORDER — METRONIDAZOLE 7.5 MG/G
GEL TOPICAL 2 TIMES DAILY
Qty: 45 G | Refills: 1 | Status: SHIPPED | OUTPATIENT
Start: 2022-10-11 | End: 2024-04-02

## 2022-10-11 ASSESSMENT — PAIN SCALES - GENERAL: PAINLEVEL: NO PAIN (0)

## 2022-10-11 NOTE — NURSING NOTE
"Chief Complaint   Patient presents with     Derm Problem       Initial /78 (BP Location: Left arm, Patient Position: Sitting, Cuff Size: Adult Large)   Pulse 78   Temp 98  F (36.7  C) (Tympanic)   Ht 1.88 m (6' 2\")   Wt 125 kg (275 lb 8 oz)   SpO2 97%   BMI 35.37 kg/m   Estimated body mass index is 35.37 kg/m  as calculated from the following:    Height as of this encounter: 1.88 m (6' 2\").    Weight as of this encounter: 125 kg (275 lb 8 oz).  Medication Reconciliation: complete  Liliana Castillo LPN  "

## 2022-10-18 ENCOUNTER — TELEPHONE (OUTPATIENT)
Dept: ALLERGY | Facility: OTHER | Age: 57
End: 2022-10-18

## 2022-10-18 NOTE — TELEPHONE ENCOUNTER
Pt came in late yesterday for his allergy injections.  He was noted to have a large hives/rash on his upper left arm.  He also said his chest felt tight.  This writer spoke to HARSHAD Walker and was advised to hold the injection today.  She said to then update his provider Jeanna Celeste PA-C for further recommendation.  He stated that he just had both a flu shot and Tetanus shot about 1-2 weeks ago.  He said that this time of year is bad and he has more outbreaks of hives/rashes.  He is taking his Zyrtec each PM and Allegra each AM.  He also called this AM prior to this writer being able to speak to Jeanna, to let her know he did take an extra Benadryl last night and the hives resolved.  He did also mention that he was exposed top Covid last night by a co-worker, who is in bed and running a fever.  He would like to know what he should do to keep on track with his allergy injections.  Is he safe to get his injections if he is having one of these hive breakouts?  Please advise, thank you!    Marshall Walker RN on 10/18/2022 at 11:05 AM

## 2022-10-19 ENCOUNTER — ALLIED HEALTH/NURSE VISIT (OUTPATIENT)
Dept: ALLERGY | Facility: OTHER | Age: 57
End: 2022-10-19
Attending: PHYSICIAN ASSISTANT
Payer: COMMERCIAL

## 2022-10-19 DIAGNOSIS — J30.2 PERENNIAL ALLERGIC RHINITIS WITH SEASONAL VARIATION: Primary | ICD-10-CM

## 2022-10-19 DIAGNOSIS — J30.89 PERENNIAL ALLERGIC RHINITIS WITH SEASONAL VARIATION: Primary | ICD-10-CM

## 2022-10-19 PROCEDURE — 95117 IMMUNOTHERAPY INJECTIONS: CPT

## 2022-10-19 NOTE — TELEPHONE ENCOUNTER
Response from Jeanna:    Use Benadryl the night prior to allergy injection.  Consider derm consult. Consider Xoliar for options due to recurrent urticaria. TR     Patient was notified.  He will think about the derm consult.  He is feeling well and asymptomatic, after his exposure to Covid.  He has no rashes/hives at the present time.  He would like to come in and get his allergy injection today.  He was scheduled for 10:00am this morning.    Marshall Walker RN on 10/19/2022 at 9:48 AM

## 2022-10-19 NOTE — PROGRESS NOTES
Prior to injection verified pt identity using pt name and date of birth.    Allergy injection/s given and charted on paper allergy flow sheet.  Patient left AMA, signing form, not staying for the observation period.    Marshall Walker RN on 10/19/2022 at 10:25 AM

## 2022-10-27 ENCOUNTER — TELEPHONE (OUTPATIENT)
Dept: ALLERGY | Facility: OTHER | Age: 57
End: 2022-10-27

## 2022-10-27 ENCOUNTER — ALLIED HEALTH/NURSE VISIT (OUTPATIENT)
Dept: ALLERGY | Facility: OTHER | Age: 57
End: 2022-10-27
Attending: PHYSICIAN ASSISTANT
Payer: COMMERCIAL

## 2022-10-27 DIAGNOSIS — L50.9 URTICARIA: Primary | ICD-10-CM

## 2022-10-27 DIAGNOSIS — J30.89 PERENNIAL ALLERGIC RHINITIS WITH SEASONAL VARIATION: Primary | ICD-10-CM

## 2022-10-27 DIAGNOSIS — J30.2 PERENNIAL ALLERGIC RHINITIS WITH SEASONAL VARIATION: Primary | ICD-10-CM

## 2022-10-27 PROCEDURE — 95117 IMMUNOTHERAPY INJECTIONS: CPT

## 2022-10-27 NOTE — TELEPHONE ENCOUNTER
Patient would like a referral to Dermatology in [place].  Would you please place this consult for him, thank you!    Marshall Walker RN on 10/27/2022 at 2:33 PM

## 2022-10-27 NOTE — PROGRESS NOTES
Prior to injection verified pt identity using pt name and date of birth.    Allergy injection/s given and charted on paper allergy flow sheet.  Patient left AMA, signing form, not staying for the observation period.    Marshall Walker RN on 10/27/2022 at 11:39 AM         no diarrhea/no melena

## 2022-11-10 ENCOUNTER — IMMUNIZATION (OUTPATIENT)
Dept: FAMILY MEDICINE | Facility: OTHER | Age: 57
End: 2022-11-10
Attending: FAMILY MEDICINE
Payer: COMMERCIAL

## 2022-11-10 PROCEDURE — 0124A COVID-19,PF,PFIZER BOOSTER BIVALENT: CPT

## 2022-11-10 PROCEDURE — 91312 COVID-19,PF,PFIZER BOOSTER BIVALENT: CPT

## 2022-11-21 ENCOUNTER — ALLIED HEALTH/NURSE VISIT (OUTPATIENT)
Dept: ALLERGY | Facility: OTHER | Age: 57
End: 2022-11-21
Attending: PHYSICIAN ASSISTANT
Payer: COMMERCIAL

## 2022-11-21 DIAGNOSIS — J30.2 PERENNIAL ALLERGIC RHINITIS WITH SEASONAL VARIATION: Primary | ICD-10-CM

## 2022-11-21 DIAGNOSIS — L50.9 URTICARIA: ICD-10-CM

## 2022-11-21 DIAGNOSIS — J30.89 PERENNIAL ALLERGIC RHINITIS WITH SEASONAL VARIATION: Primary | ICD-10-CM

## 2022-11-21 PROCEDURE — 95117 IMMUNOTHERAPY INJECTIONS: CPT

## 2022-11-21 NOTE — PROGRESS NOTES
Prior to injection verified pt identity using pt name and date of birth.    Allergy injection/s given and charted on paper allergy flow sheet.  Patient left AMA, signing form, not staying for the observation period.    Marshall Walker RN on 11/21/2022 at 3:25 PM

## 2022-11-29 ENCOUNTER — ALLIED HEALTH/NURSE VISIT (OUTPATIENT)
Dept: ALLERGY | Facility: OTHER | Age: 57
End: 2022-11-29
Attending: PHYSICIAN ASSISTANT
Payer: COMMERCIAL

## 2022-11-29 DIAGNOSIS — J30.2 PERENNIAL ALLERGIC RHINITIS WITH SEASONAL VARIATION: Primary | ICD-10-CM

## 2022-11-29 DIAGNOSIS — J30.89 PERENNIAL ALLERGIC RHINITIS WITH SEASONAL VARIATION: Primary | ICD-10-CM

## 2022-11-29 PROCEDURE — 95117 IMMUNOTHERAPY INJECTIONS: CPT

## 2022-11-29 NOTE — PROGRESS NOTES
Prior to injection verified pt identity using pt name and date of birth.    Allergy injection/s given and charted on paper allergy flow sheet.  Patient left AMA, signing form, not staying for the observation period.    Ce Vitale RN on 11/29/2022 at 4:30 PM

## 2022-12-06 ENCOUNTER — ALLIED HEALTH/NURSE VISIT (OUTPATIENT)
Dept: ALLERGY | Facility: OTHER | Age: 57
End: 2022-12-06
Attending: PHYSICIAN ASSISTANT
Payer: COMMERCIAL

## 2022-12-06 DIAGNOSIS — J30.2 PERENNIAL ALLERGIC RHINITIS WITH SEASONAL VARIATION: Primary | ICD-10-CM

## 2022-12-06 DIAGNOSIS — J30.89 PERENNIAL ALLERGIC RHINITIS WITH SEASONAL VARIATION: Primary | ICD-10-CM

## 2022-12-06 PROCEDURE — 95117 IMMUNOTHERAPY INJECTIONS: CPT

## 2022-12-06 NOTE — PROGRESS NOTES
Prior to injection verified pt identity using pt name and date of birth.    Allergy injection/s given and charted on paper allergy flow sheet.  Patient left AMA, signing form, not staying for the observation period.    Ce Vitale RN on 12/6/2022 at 3:50 PM

## 2022-12-13 ENCOUNTER — ALLIED HEALTH/NURSE VISIT (OUTPATIENT)
Dept: ALLERGY | Facility: OTHER | Age: 57
End: 2022-12-13
Attending: PHYSICIAN ASSISTANT
Payer: COMMERCIAL

## 2022-12-13 DIAGNOSIS — J30.2 PERENNIAL ALLERGIC RHINITIS WITH SEASONAL VARIATION: Primary | ICD-10-CM

## 2022-12-13 DIAGNOSIS — J30.89 PERENNIAL ALLERGIC RHINITIS WITH SEASONAL VARIATION: Primary | ICD-10-CM

## 2022-12-13 PROCEDURE — 95117 IMMUNOTHERAPY INJECTIONS: CPT

## 2022-12-13 NOTE — PROGRESS NOTES
Prior to injection verified pt identity using pt name and date of birth.    Allergy injection/s given and charted on paper allergy flow sheet.  Patient left AMA, signing form, not staying for the observation period.    Marshall Walker RN on 12/13/2022 at 4:18 PM

## 2022-12-14 ENCOUNTER — OFFICE VISIT (OUTPATIENT)
Dept: FAMILY MEDICINE | Facility: OTHER | Age: 57
End: 2022-12-14
Attending: FAMILY MEDICINE
Payer: COMMERCIAL

## 2022-12-14 VITALS
RESPIRATION RATE: 18 BRPM | HEART RATE: 89 BPM | OXYGEN SATURATION: 97 % | BODY MASS INDEX: 34.67 KG/M2 | DIASTOLIC BLOOD PRESSURE: 80 MMHG | WEIGHT: 270 LBS | TEMPERATURE: 99 F | SYSTOLIC BLOOD PRESSURE: 125 MMHG

## 2022-12-14 DIAGNOSIS — J02.9 PHARYNGITIS, UNSPECIFIED ETIOLOGY: Primary | ICD-10-CM

## 2022-12-14 PROCEDURE — 99213 OFFICE O/P EST LOW 20 MIN: CPT | Performed by: FAMILY MEDICINE

## 2022-12-14 RX ORDER — AMOXICILLIN 875 MG
875 TABLET ORAL 2 TIMES DAILY
Qty: 20 TABLET | Refills: 0 | Status: SHIPPED | OUTPATIENT
Start: 2022-12-14 | End: 2022-12-24

## 2022-12-14 ASSESSMENT — ANXIETY QUESTIONNAIRES
6. BECOMING EASILY ANNOYED OR IRRITABLE: NOT AT ALL
4. TROUBLE RELAXING: SEVERAL DAYS
7. FEELING AFRAID AS IF SOMETHING AWFUL MIGHT HAPPEN: SEVERAL DAYS
GAD7 TOTAL SCORE: 4
1. FEELING NERVOUS, ANXIOUS, OR ON EDGE: SEVERAL DAYS
GAD7 TOTAL SCORE: 4
5. BEING SO RESTLESS THAT IT IS HARD TO SIT STILL: NOT AT ALL
IF YOU CHECKED OFF ANY PROBLEMS ON THIS QUESTIONNAIRE, HOW DIFFICULT HAVE THESE PROBLEMS MADE IT FOR YOU TO DO YOUR WORK, TAKE CARE OF THINGS AT HOME, OR GET ALONG WITH OTHER PEOPLE: SOMEWHAT DIFFICULT
3. WORRYING TOO MUCH ABOUT DIFFERENT THINGS: SEVERAL DAYS
2. NOT BEING ABLE TO STOP OR CONTROL WORRYING: NOT AT ALL

## 2022-12-14 ASSESSMENT — ENCOUNTER SYMPTOMS
SINUS PRESSURE: 0
SINUS PAIN: 0
SHORTNESS OF BREATH: 0
FEVER: 0

## 2022-12-14 ASSESSMENT — PAIN SCALES - GENERAL: PAINLEVEL: MILD PAIN (3)

## 2022-12-14 ASSESSMENT — PATIENT HEALTH QUESTIONNAIRE - PHQ9: SUM OF ALL RESPONSES TO PHQ QUESTIONS 1-9: 9

## 2022-12-14 NOTE — PROGRESS NOTES
"  Assessment & Plan     Pharyngitis, unspecified etiology  - amoxicillin (AMOXIL) 875 MG tablet; Take 1 tablet (875 mg) by mouth 2 times daily for 10 days    Suspicion is ongoing or recurrent viral, but given duration will try some Amoxicillin.  Follow-up as needed.      AASHISH OSEGUERA,   [facility] - [facility]    Yeison Drew is a 57 year old, presenting for the following health issues:  acute illness      HPI     Started with sinus \"cold\" symptoms about 12-15 days ago.  Moved into throat, sinuses got better.  He thought it was going to go into his lungs, but it didn't.  Having ongoing throat discomfort, seems to come and go.  No fever.  No ear pain.   Frequent ill exposures at his Screen  when picking them up.    Therapies tried and outcome: swishing salt water, swishing alcohol, nyquil, spiced tea some help but not lasting        Review of Systems   Constitutional: Negative for fever.   HENT: Negative for ear discharge, ear pain, sinus pressure and sinus pain.    Respiratory: Negative for shortness of breath.             Objective    /80 (BP Location: Right arm, Patient Position: Sitting, Cuff Size: Adult Large)   Pulse 89   Temp 99  F (37.2  C) (Tympanic)   Resp 18   Wt 122.5 kg (270 lb)   SpO2 97%   BMI 34.67 kg/m    Body mass index is 34.67 kg/m .  Physical Exam  Constitutional:       General: He is not in acute distress.     Appearance: Normal appearance.   HENT:      Head: Normocephalic and atraumatic.      Right Ear: Tympanic membrane, ear canal and external ear normal.      Left Ear: Tympanic membrane, ear canal and external ear normal.      Nose: Nose normal.      Mouth/Throat:      Comments: Minimal throat irritation  Eyes:      Conjunctiva/sclera: Conjunctivae normal.      Pupils: Pupils are equal, round, and reactive to light.   Neck:      Comments: A couple mildly enlarged anterior chain nodes on the right  Cardiovascular:      Rate and Rhythm: Normal rate and " regular rhythm.      Heart sounds: Normal heart sounds. No murmur heard.  Pulmonary:      Effort: Pulmonary effort is normal.      Breath sounds: Normal breath sounds. No wheezing, rhonchi or rales.   Neurological:      Mental Status: He is alert and oriented to person, place, and time.

## 2022-12-27 ENCOUNTER — ALLIED HEALTH/NURSE VISIT (OUTPATIENT)
Dept: ALLERGY | Facility: OTHER | Age: 57
End: 2022-12-27
Attending: PHYSICIAN ASSISTANT
Payer: COMMERCIAL

## 2022-12-27 DIAGNOSIS — J30.89 PERENNIAL ALLERGIC RHINITIS WITH SEASONAL VARIATION: Primary | ICD-10-CM

## 2022-12-27 DIAGNOSIS — J30.2 PERENNIAL ALLERGIC RHINITIS WITH SEASONAL VARIATION: Primary | ICD-10-CM

## 2022-12-27 PROCEDURE — 95117 IMMUNOTHERAPY INJECTIONS: CPT

## 2022-12-27 NOTE — PROGRESS NOTES
Prior to injection verified pt identity using pt name and date of birth.    Allergy injection/s given and charted on paper allergy flow sheet.  Patient left AMA, signing form, not staying for the observation period.    Marshall Walker RN on 12/27/2022 at 8:21 AM

## 2023-01-03 ENCOUNTER — ALLIED HEALTH/NURSE VISIT (OUTPATIENT)
Dept: ALLERGY | Facility: OTHER | Age: 58
End: 2023-01-03
Attending: PHYSICIAN ASSISTANT
Payer: COMMERCIAL

## 2023-01-03 DIAGNOSIS — J30.89 PERENNIAL ALLERGIC RHINITIS WITH SEASONAL VARIATION: Primary | ICD-10-CM

## 2023-01-03 DIAGNOSIS — J30.2 PERENNIAL ALLERGIC RHINITIS WITH SEASONAL VARIATION: Primary | ICD-10-CM

## 2023-01-03 PROCEDURE — 95117 IMMUNOTHERAPY INJECTIONS: CPT

## 2023-01-03 NOTE — PROGRESS NOTES
Prior to injection verified pt identity using pt name and date of birth.    Allergy injection/s given and charted on paper allergy flow sheet.  Patient left AMA, signing form, not staying for the observation period.    Marshall Walker RN on 1/3/2023 at 4:13 PM

## 2023-01-10 ENCOUNTER — OFFICE VISIT (OUTPATIENT)
Dept: ALLERGY | Facility: OTHER | Age: 58
End: 2023-01-10
Payer: COMMERCIAL

## 2023-01-10 DIAGNOSIS — J30.89 PERENNIAL ALLERGIC RHINITIS WITH SEASONAL VARIATION: Primary | ICD-10-CM

## 2023-01-10 DIAGNOSIS — J30.2 PERENNIAL ALLERGIC RHINITIS WITH SEASONAL VARIATION: Primary | ICD-10-CM

## 2023-01-10 NOTE — NURSING NOTE
Patient presented to the allergy clinic for his allergy injection.  He notified the writer that he was having some symptoms.  He states that at 2:00 pm he ate some rice pilaf, and he started feeling weak, had a weird headache, and some weakness with swallowing.   He took 2 Benadryl tablets prior to coming to the clinic.  He said he noticed some facial numbness in both his cheeks (worse on the right), but he has had those symptoms  before.   He also noticed a few red blotchy red spots on his hands.  He said his symptoms are coming and going, and he is feeling a little bit better at times.  No acute s/s of distress noted while at the clinic.  VS all wnl Bp 135/86, P 82 and Sat 98%.   He has also been having difficulty sleeping, so he switched his Zyrtec from the PM to the AM, and his Allegra from the AM to the PM.  Spoke to his ENT provider Jeanna Celeste PA-C.  She said to hold his allergy injections today.  He was advised to go to the ER if he develops worsening symptoms,  any chest pain, difficulty breathing or swallowing.  He verbalized understanding.    Marshall Walker RN on 1/10/2023 at 4:43 PM

## 2023-01-17 ENCOUNTER — OFFICE VISIT (OUTPATIENT)
Dept: FAMILY MEDICINE | Facility: OTHER | Age: 58
End: 2023-01-17
Attending: FAMILY MEDICINE
Payer: COMMERCIAL

## 2023-01-17 ENCOUNTER — ALLIED HEALTH/NURSE VISIT (OUTPATIENT)
Dept: ALLERGY | Facility: OTHER | Age: 58
End: 2023-01-17
Attending: PHYSICIAN ASSISTANT
Payer: COMMERCIAL

## 2023-01-17 VITALS
DIASTOLIC BLOOD PRESSURE: 82 MMHG | TEMPERATURE: 98.1 F | HEIGHT: 74 IN | WEIGHT: 269.6 LBS | SYSTOLIC BLOOD PRESSURE: 124 MMHG | BODY MASS INDEX: 34.6 KG/M2 | OXYGEN SATURATION: 97 % | HEART RATE: 89 BPM

## 2023-01-17 DIAGNOSIS — J30.2 PERENNIAL ALLERGIC RHINITIS WITH SEASONAL VARIATION: Primary | ICD-10-CM

## 2023-01-17 DIAGNOSIS — L50.8 CHRONIC URTICARIA: Primary | ICD-10-CM

## 2023-01-17 DIAGNOSIS — F41.1 GAD (GENERALIZED ANXIETY DISORDER): ICD-10-CM

## 2023-01-17 DIAGNOSIS — J30.89 PERENNIAL ALLERGIC RHINITIS WITH SEASONAL VARIATION: Primary | ICD-10-CM

## 2023-01-17 PROCEDURE — 95117 IMMUNOTHERAPY INJECTIONS: CPT

## 2023-01-17 PROCEDURE — 99213 OFFICE O/P EST LOW 20 MIN: CPT | Performed by: FAMILY MEDICINE

## 2023-01-17 RX ORDER — MONTELUKAST SODIUM 10 MG/1
10 TABLET ORAL AT BEDTIME
Qty: 90 TABLET | Refills: 3 | Status: SHIPPED | OUTPATIENT
Start: 2023-01-17 | End: 2023-09-01

## 2023-01-17 ASSESSMENT — ANXIETY QUESTIONNAIRES
3. WORRYING TOO MUCH ABOUT DIFFERENT THINGS: MORE THAN HALF THE DAYS
6. BECOMING EASILY ANNOYED OR IRRITABLE: SEVERAL DAYS
7. FEELING AFRAID AS IF SOMETHING AWFUL MIGHT HAPPEN: MORE THAN HALF THE DAYS
2. NOT BEING ABLE TO STOP OR CONTROL WORRYING: MORE THAN HALF THE DAYS
GAD7 TOTAL SCORE: 12
GAD7 TOTAL SCORE: 12
5. BEING SO RESTLESS THAT IT IS HARD TO SIT STILL: SEVERAL DAYS
1. FEELING NERVOUS, ANXIOUS, OR ON EDGE: MORE THAN HALF THE DAYS
4. TROUBLE RELAXING: MORE THAN HALF THE DAYS

## 2023-01-17 ASSESSMENT — PATIENT HEALTH QUESTIONNAIRE - PHQ9: SUM OF ALL RESPONSES TO PHQ QUESTIONS 1-9: 17

## 2023-01-17 NOTE — PATIENT INSTRUCTIONS
Psychologists/ Counselors                        Sissy Clark          ...            ..[phone]  [facility]                    ..  [phone]  [facility]                 .[phone]  [facility] (kids)       .         [phone]  [facility](teens)                ..[phone]  [facility]                    [phone]  [facility]                   [phone]  [facility]           ...      .. [phone]  [facility]                ...[phone]  [facility]     ...          ...[phone]  [facility]               [phone]   [facility]..            [phone]  [facility]                       [phone]  [facility]               .    [phone]  [facility]                ..[phone]  [facility]     .      . ..[phone]     [facility]              .   [phone]  [facility]                   [phone]              [facility]              .   [phone]  [facility]              .  ...[phone]  [facility]              .   ..[phone]  [facility]           ...      .. [phone]  [facility]              . ..[phone]  [facility]              .    ..[phone]  [facility]              .     .. [phone]  [facility]                 ... [phone]  [facility]                         .[phone]  [facility]     .      . ..[phone]            [facility]              ..  [phone]                   [facility]             . ... ..[phone]  [facility] [place] [facility]              .      ..[phone]  [facility]              .     ..[phone]  [facility]        .      . [phone]  [facility]           .[phone]  [facility]         .    ..[phone]  [facility]             . ..  ..  [phone]  [facility]            [phone]  [facility]              ..[phone]  [facility]                     [phone]    [facility]           ..     [phone]     [facility]    ...     ...[phone]     [facility]            [phone]                                                  [place]  Evan Walters                ..  .  [phone]  [facility]         .     .  [phone]  [facility] ELIESER Ron              . [phone]  [facility]  ..        [phone]  [facility]              .    [phone]    [facility]               ...  .  [phone]  [facility]             . [phone]  [facility]            [phone]  [facility]     .      . ..[phone]         *Facilities in bold italics indicate medication management  Services are offered.     Crisis support    If you or someone you know is struggling or in crisis, help is available. Call or text 358 or chat [email or web address]    The volunteer Crisis Counselor will help you move from a 'hot moment to a cool moment'

## 2023-01-17 NOTE — PROGRESS NOTES
"Prior to injection verified pt identity using pt name and date of birth.    Patient had sporadic hives on his arms and his chest, denied any issues breathing. Did state he got prescribed Singulair starting today. After speaking with provider regarding the hives, provider gave the \"okay\" to give patient allergy shots today.    Allergy injection/s given and charted on paper allergy flow sheet.  Patient left AMA, signing form, not staying for the observation period.      Ce Vitale RN on 1/17/2023 at 3:34 PM        "

## 2023-01-17 NOTE — PROGRESS NOTES
Assessment & Plan     Chronic urticaria  Will add Singulair to his regimen. Pt to consider cleaning forced air vents of his house.  Has been trying new filters for furnace. Consider newer house.    - montelukast (SINGULAIR) 10 MG tablet; Take 1 tablet (10 mg) by mouth At Bedtime    CLAUDETTE (generalized anxiety disorder)  This also worse with above or maybe causing the above. Discussed. Will try counseling.  list given                No follow-ups on file.    Bhupinder Mckeon MD  [facility] - [facility] Drew is a 57 year old, presenting for the following health issues:  Anxiety      HPI     Depression and Anxiety Follow-Up    How are you doing with your depression since your last visit? No change    How are you doing with your anxiety since your last visit?  Worsened has these rash that flairs up every winter,worse this year. Starts out on hands and forehead, then appears on chest.     Are you having other symptoms that might be associated with depression or anxiety? Yes:  dizziness, doom feeling    Have you had a significant life event? Grief or Loss     Do you have any concerns with your use of alcohol or other drugs? No  Winter is always worse   Hardly any rash issues in summer  Starts in November  Has allergies to mold/dust and working with ENT  CLAUDETTE/depression worse in winter- urticaria causing increase in this or vise versa    Social History     Tobacco Use     Smoking status: Never     Smokeless tobacco: Never   Vaping Use     Vaping Use: Never used   Substance Use Topics     Alcohol use: Yes     Alcohol/week: 3.3 standard drinks     Types: 4 Cans of beer per week     Comment: rarely     Drug use: No     PHQ 9/28/2022 12/14/2022 1/17/2023   PHQ-9 Total Score 5 9 17   Q9: Thoughts of better off dead/self-harm past 2 weeks Not at all Not at all Not at all     CLAUDETTE-7 SCORE 9/28/2022 12/14/2022 1/17/2023   Total Score 6 4 12     Last PHQ-9 1/17/2023   1.  Little interest or pleasure in  "doing things 2   2.  Feeling down, depressed, or hopeless 2   3.  Trouble falling or staying asleep, or sleeping too much 2   4.  Feeling tired or having little energy 3   5.  Poor appetite or overeating 2   6.  Feeling bad about yourself 2   7.  Trouble concentrating 2   8.  Moving slowly or restless 2   Q9: Thoughts of better off dead/self-harm past 2 weeks 0   PHQ-9 Total Score 17   Difficulty at work, home, or with people Very difficult     CLAUDETTE-7  1/17/2023   1. Feeling nervous, anxious, or on edge 2   2. Not being able to stop or control worrying 2   3. Worrying too much about different things 2   4. Trouble relaxing 2   5. Being so restless that it is hard to sit still 1   6. Becoming easily annoyed or irritable 1   7. Feeling afraid, as if something awful might happen 2   CLAUDETTE-7 Total Score 12   If you checked any problems, how difficult have they made it for you to do your work, take care of things at home, or get along with other people? -       Suicide Assessment Five-step Evaluation and Treatment (SAFE-T)            Review of Systems   Constitutional, HEENT, cardiovascular, pulmonary, gi and gu systems are negative, except as otherwise noted.      Objective    /82 (BP Location: Left arm, Patient Position: Sitting, Cuff Size: Adult Large)   Pulse 89   Temp 98.1  F (36.7  C) (Tympanic)   Ht 1.88 m (6' 2\")   Wt 122.3 kg (269 lb 9.6 oz)   SpO2 97%   BMI 34.61 kg/m    Body mass index is 34.61 kg/m .  Physical Exam   GENERAL: healthy, alert and no distress  EYES: Eyes grossly normal to inspection, PERRL and conjunctivae and sclerae normal  HENT: ear canals and TM's normal, nose and mouth without ulcers or lesions  NECK: no adenopathy, no asymmetry, masses, or scars and thyroid normal to palpation  RESP: lungs clear to auscultation - no rales, rhonchi or wheezes  CV: regular rate and rhythm, normal S1 S2, no S3 or S4, no murmur, click or rub, no peripheral edema and peripheral pulses strong  ABDOMEN: " soft, nontender, no hepatosplenomegaly, no masses and bowel sounds normal  MS: no gross musculoskeletal defects noted, no edema  SKIN: small itchy papules

## 2023-01-25 ENCOUNTER — ALLIED HEALTH/NURSE VISIT (OUTPATIENT)
Dept: ALLERGY | Facility: OTHER | Age: 58
End: 2023-01-25
Attending: NURSE PRACTITIONER
Payer: COMMERCIAL

## 2023-01-25 DIAGNOSIS — J30.89 PERENNIAL ALLERGIC RHINITIS WITH SEASONAL VARIATION: Primary | ICD-10-CM

## 2023-01-25 DIAGNOSIS — J30.2 PERENNIAL ALLERGIC RHINITIS WITH SEASONAL VARIATION: Primary | ICD-10-CM

## 2023-01-25 PROCEDURE — 95117 IMMUNOTHERAPY INJECTIONS: CPT

## 2023-01-25 NOTE — PROGRESS NOTES
Prior to injection verified pt identity using pt name and date of birth.    Allergy injection/s given and charted on paper allergy flow sheet.  Patient left AMA, signing form, not staying for the observation period.    Marshall Walker RN on 1/25/2023 at 1:17 PM

## 2023-01-31 ENCOUNTER — ALLIED HEALTH/NURSE VISIT (OUTPATIENT)
Dept: ALLERGY | Facility: OTHER | Age: 58
End: 2023-01-31
Attending: PHYSICIAN ASSISTANT
Payer: COMMERCIAL

## 2023-01-31 DIAGNOSIS — J30.89 PERENNIAL ALLERGIC RHINITIS WITH SEASONAL VARIATION: Primary | ICD-10-CM

## 2023-01-31 DIAGNOSIS — J30.2 PERENNIAL ALLERGIC RHINITIS WITH SEASONAL VARIATION: Primary | ICD-10-CM

## 2023-01-31 PROCEDURE — 95117 IMMUNOTHERAPY INJECTIONS: CPT

## 2023-01-31 NOTE — PROGRESS NOTES
Prior to injection verified pt identity using pt name and date of birth.    Allergy injection/s given and charted on paper allergy flow sheet.  Patient left AMA, signing form, not staying for the observation period.    Marshall Walker RN on 1/31/2023 at 4:09 PM

## 2023-02-01 ENCOUNTER — ALLIED HEALTH/NURSE VISIT (OUTPATIENT)
Dept: ALLERGY | Facility: OTHER | Age: 58
End: 2023-02-01
Attending: PHYSICIAN ASSISTANT
Payer: COMMERCIAL

## 2023-02-01 DIAGNOSIS — J30.2 PERENNIAL ALLERGIC RHINITIS WITH SEASONAL VARIATION: Primary | ICD-10-CM

## 2023-02-01 DIAGNOSIS — J30.89 PERENNIAL ALLERGIC RHINITIS WITH SEASONAL VARIATION: Primary | ICD-10-CM

## 2023-02-01 PROCEDURE — 95165 ANTIGEN THERAPY SERVICES: CPT | Performed by: PHYSICIAN ASSISTANT

## 2023-02-01 NOTE — PROGRESS NOTES
Allergy serum is mixed today at  Gold schedule 2 of 4,  into  2  (5 ml)  multi dose vial/vials.    Allergens included were:    Ragweed  0.2 ml of dilution # 0  Pigweed  0.2 ml of dilution # 2  Mugwort 0.2 ml of dilution # 0  Kochia  0.2 ml of dilution # 0  Russian Thistle 0.2 ml of dilution # 1  Saurav Grass 0.2 ml of dilution # 1  Birch mix 0.2 ml of dilution # 1  Maple Mix 0.2 of dilution # 1  Elm Mix 0.2 ml of dilution # 1  Oak Mix 0.2 ml of dilution # 1  Osbaldo Mix 0.2 ml of dilution # 1  Pine Mix 0.2 ml of dilution # 1  [place] 0.2 ml of dilution # 0  Black [place] 0.2 ml of dilution # 1  Aspen 0.2 ml of dilution # 0  Red [place] 0.2 ml of dilution # 0    Alternaria 0.2 ml of dilution # 3  Aspergillus 0.2 ml of dilution # 1  Hormodendrum 0.2 ml of dilution # 1  Helminthosporium 0.2 ml of dilution # 0  Penicillium 0.2 ml of dilution # 1  Epicoccum 0.2 ml of dilution # 1  Fusarium 0.2 ml of dilution # 1  Mucor 0.2 ml of dilution # 0  Grain Smut 0.2 ml of dilution # 0  Grass Smut 0.2 ml of dilution # 0  Cat 0.2 ml of dilution # 1  Dog 0.2 ml of dilution # 1  Feather Mix 0.2 ml of dilution # 0  Dust Mite Mix 0.2 ml of dilution # 3  Horse 0.2 ml of dilution # 0    Marshall Walker RN on 2/1/2023 at 10:06 AM

## 2023-02-07 ENCOUNTER — ALLIED HEALTH/NURSE VISIT (OUTPATIENT)
Dept: ALLERGY | Facility: OTHER | Age: 58
End: 2023-02-07
Attending: PHYSICIAN ASSISTANT
Payer: COMMERCIAL

## 2023-02-07 DIAGNOSIS — J30.2 PERENNIAL ALLERGIC RHINITIS WITH SEASONAL VARIATION: Primary | ICD-10-CM

## 2023-02-07 DIAGNOSIS — J30.89 PERENNIAL ALLERGIC RHINITIS WITH SEASONAL VARIATION: Primary | ICD-10-CM

## 2023-02-07 PROCEDURE — 95117 IMMUNOTHERAPY INJECTIONS: CPT

## 2023-02-07 NOTE — PROGRESS NOTES
Prior to injection patient identity verified using name and date of birth.    SVT done on right arm, measuring 7 mm.  Passed  SVT done on left arm, measuring 9 mm.  Passed  Documented on paper flowsheet.     Allergy injection/s given and charted on paper allergy flow sheet.  Patient left AMA, signing form, not staying for the observation period.      Ce Vitale RN on 2/7/2023 at 1:37 PM

## 2023-02-13 ENCOUNTER — ALLIED HEALTH/NURSE VISIT (OUTPATIENT)
Dept: ALLERGY | Facility: OTHER | Age: 58
End: 2023-02-13
Attending: PHYSICIAN ASSISTANT
Payer: COMMERCIAL

## 2023-02-13 DIAGNOSIS — J30.89 PERENNIAL ALLERGIC RHINITIS WITH SEASONAL VARIATION: Primary | ICD-10-CM

## 2023-02-13 DIAGNOSIS — J30.2 PERENNIAL ALLERGIC RHINITIS WITH SEASONAL VARIATION: Primary | ICD-10-CM

## 2023-02-13 PROCEDURE — 95117 IMMUNOTHERAPY INJECTIONS: CPT

## 2023-02-13 NOTE — PROGRESS NOTES
Prior to injection verified pt identity using pt name and date of birth.    Ok'd by provider to give shot a day early. Allergy injection/s given and charted on paper allergy flow sheet.  Patient left AMA, signing form, not staying for the observation period.    Ce Vitale RN on 2/13/2023 at 8:17 AM

## 2023-02-17 ENCOUNTER — APPOINTMENT (OUTPATIENT)
Dept: CT IMAGING | Facility: CLINIC | Age: 58
End: 2023-02-17
Attending: EMERGENCY MEDICINE
Payer: COMMERCIAL

## 2023-02-17 ENCOUNTER — HOSPITAL ENCOUNTER (EMERGENCY)
Facility: CLINIC | Age: 58
Discharge: HOME | End: 2023-02-17
Attending: EMERGENCY MEDICINE | Admitting: EMERGENCY MEDICINE
Payer: COMMERCIAL

## 2023-02-17 VITALS
RESPIRATION RATE: 16 BRPM | DIASTOLIC BLOOD PRESSURE: 65 MMHG | SYSTOLIC BLOOD PRESSURE: 144 MMHG | TEMPERATURE: 98.8 F | OXYGEN SATURATION: 98 % | HEART RATE: 78 BPM

## 2023-02-17 DIAGNOSIS — R51.9 ACUTE NONINTRACTABLE HEADACHE, UNSPECIFIED HEADACHE TYPE: ICD-10-CM

## 2023-02-17 LAB
ALBUMIN SERPL BCG-MCNC: 4.7 G/DL (ref 3.5–5.2)
ALP SERPL-CCNC: 73 U/L (ref 40–129)
ALT SERPL W P-5'-P-CCNC: 22 U/L (ref 10–50)
ANION GAP SERPL CALCULATED.3IONS-SCNC: 13 MMOL/L (ref 7–15)
AST SERPL W P-5'-P-CCNC: 24 U/L (ref 10–50)
BASOPHILS # BLD AUTO: 0 10E3/UL (ref 0–0.2)
BASOPHILS NFR BLD AUTO: 0 %
BILIRUB SERPL-MCNC: 0.8 MG/DL
BUN SERPL-MCNC: 13 MG/DL (ref 6–20)
CALCIUM SERPL-MCNC: 9.5 MG/DL (ref 8.6–10)
CHLORIDE SERPL-SCNC: 104 MMOL/L (ref 98–107)
CREAT SERPL-MCNC: 1.03 MG/DL (ref 0.67–1.17)
DEPRECATED HCO3 PLAS-SCNC: 22 MMOL/L (ref 22–29)
EOSINOPHIL # BLD AUTO: 0.1 10E3/UL (ref 0–0.7)
EOSINOPHIL NFR BLD AUTO: 1 %
ERYTHROCYTE [DISTWIDTH] IN BLOOD BY AUTOMATED COUNT: 12.8 % (ref 10–15)
GFR SERPL CREATININE-BSD FRML MDRD: 86 ML/MIN/1.73M2
GLUCOSE SERPL-MCNC: 98 MG/DL (ref 70–99)
HCT VFR BLD AUTO: 45 % (ref 40–53)
HGB BLD-MCNC: 15.6 G/DL (ref 13.3–17.7)
IMM GRANULOCYTES # BLD: 0 10E3/UL
IMM GRANULOCYTES NFR BLD: 0 %
LYMPHOCYTES # BLD AUTO: 1.4 10E3/UL (ref 0.8–5.3)
LYMPHOCYTES NFR BLD AUTO: 17 %
MCH RBC QN AUTO: 29.9 PG (ref 26.5–33)
MCHC RBC AUTO-ENTMCNC: 34.7 G/DL (ref 31.5–36.5)
MCV RBC AUTO: 86 FL (ref 78–100)
MONOCYTES # BLD AUTO: 0.6 10E3/UL (ref 0–1.3)
MONOCYTES NFR BLD AUTO: 7 %
NEUTROPHILS # BLD AUTO: 6.2 10E3/UL (ref 1.6–8.3)
NEUTROPHILS NFR BLD AUTO: 75 %
NRBC # BLD AUTO: 0 10E3/UL
NRBC BLD AUTO-RTO: 0 /100
PLATELET # BLD AUTO: 207 10E3/UL (ref 150–450)
POTASSIUM SERPL-SCNC: 4 MMOL/L (ref 3.4–5.3)
PROT SERPL-MCNC: 7.4 G/DL (ref 6.4–8.3)
RBC # BLD AUTO: 5.22 10E6/UL (ref 4.4–5.9)
SODIUM SERPL-SCNC: 139 MMOL/L (ref 136–145)
WBC # BLD AUTO: 8.4 10E3/UL (ref 4–11)

## 2023-02-17 PROCEDURE — 99285 EMERGENCY DEPT VISIT HI MDM: CPT | Mod: 25 | Performed by: EMERGENCY MEDICINE

## 2023-02-17 PROCEDURE — 99284 EMERGENCY DEPT VISIT MOD MDM: CPT | Performed by: EMERGENCY MEDICINE

## 2023-02-17 PROCEDURE — 80053 COMPREHEN METABOLIC PANEL: CPT | Performed by: EMERGENCY MEDICINE

## 2023-02-17 PROCEDURE — 70498 CT ANGIOGRAPHY NECK: CPT | Mod: 26 | Performed by: RADIOLOGY

## 2023-02-17 PROCEDURE — 70450 CT HEAD/BRAIN W/O DYE: CPT

## 2023-02-17 PROCEDURE — 36415 COLL VENOUS BLD VENIPUNCTURE: CPT | Performed by: EMERGENCY MEDICINE

## 2023-02-17 PROCEDURE — 250N000011 HC RX IP 250 OP 636: Performed by: EMERGENCY MEDICINE

## 2023-02-17 PROCEDURE — 85025 COMPLETE CBC W/AUTO DIFF WBC: CPT | Performed by: EMERGENCY MEDICINE

## 2023-02-17 PROCEDURE — 70496 CT ANGIOGRAPHY HEAD: CPT | Mod: 26 | Performed by: RADIOLOGY

## 2023-02-17 PROCEDURE — 70498 CT ANGIOGRAPHY NECK: CPT

## 2023-02-17 RX ORDER — IOPAMIDOL 755 MG/ML
75 INJECTION, SOLUTION INTRAVASCULAR ONCE
Status: COMPLETED | OUTPATIENT
Start: 2023-02-17 | End: 2023-02-17

## 2023-02-17 RX ADMIN — IOPAMIDOL 75 ML: 755 INJECTION, SOLUTION INTRAVENOUS at 21:09

## 2023-02-17 ASSESSMENT — ACTIVITIES OF DAILY LIVING (ADL): ADLS_ACUITY_SCORE: 35

## 2023-02-18 NOTE — ED PROVIDER NOTES
"ED Provider Note  [facility]      History     Chief Complaint   Patient presents with     Headache     HPI  Rajendra Gagnon is a 57 year old male who presents to the emergency department with headache dizziness, report of some difficulty walking, shakiness, all of which have resolved.  He describes his symptoms as \"weird, even involving pain in my butt.\"  Patient has had multiple episodes in the past, reports that they have typically been assessed to be related to either an allergy or his anxiety.  He takes 2 tablets of Benadryl every 4-6 hours.  He has had a history of an acoustic neuroma that was resected in approximately 2006.  He is supposed to undergo yearly MRIs, last one was approximately 11 months ago and showed some residual tumor which the patient has known about.  He is particularly concerned also about the circulation to his brain given that he has had family members with stenoses of the cerebral circulation.  He reports that he does not currently feel anxious and does not think his symptoms are related to anxiety at the moment.  He reports that he got good news, his daughter recently underwent kidney transplant here and was just able to be discharged earlier today.  He reports onset of symptoms around 1130 upon picking her up.  He additionally reports that he is feeling somewhat better currently.  Otherwise, he denies any recent illness such as fevers or chills, congestion.    Past Medical History  Past Medical History:   Diagnosis Date     Dysthymia 3/26/2012     Hearing loss in right ear 3/14/2012     History of atrial fibrillation 4/23/2012     Hypertriglyceridemia      Right acoustic neuroma 3/14/2012     Tinnitus, unspecified 3/14/2012     Umbilical hernia without obstruction and without gangrene 5/11/2016     Varicose veins      Varicose veins of both lower extremities 5/12/2017     Past Surgical History:   Procedure Laterality Date     acustic neuroma removed  3/2007    " right     BIOPSY PROSTATE TRANSRECTAL N/A 2/5/2019    Procedure: TRANSRECTAL Ultrasound BIOPSY of PROSTATE;  Surgeon: Filemon Betts MD;  Location: [facility] OR     COLONOSCOPY N/A 1/3/2018    Repeat in 2028//Procedure: COLONOSCOPY;  COLONOSCOPY;  Surgeon: Reggie Ron DO;  Location: [facility] OR     funnel chest[      states had chest reconstruction     HERNIA REPAIR       ORTHOPEDIC SURGERY      right knee surgery     PE TUBES       Acetaminophen (TYLENOL PO)  Ascorbic Acid (VITAMIN C) 500 MG CHEW  Calcium-Magnesium-Zinc 333-133-5 MG TABS per tablet  cetirizine (ZYRTEC) 10 MG tablet  DiphenhydrAMINE HCl (BENADRYL PO)  EPINEPHrine (ANY BX GENERIC EQUIV) 0.3 MG/0.3ML injection 2-pack  fexofenadine (ALLEGRA) 180 MG tablet  metroNIDAZOLE (METROGEL) 0.75 % external gel  montelukast (SINGULAIR) 10 MG tablet  Multiple Vitamins-Minerals (MULTIVITAMIN OR)  ORDER FOR ALLERGEN IMMUNOTHERAPY  triamcinolone (KENALOG) 0.1 % external cream  vitamin B complex with vitamin C (STRESS TAB) tablet  VITAMIN D, CHOLECALCIFEROL, PO  vitamin E 400 units TABS      Allergies   Allergen Reactions     Aspartame Nausea     Venlafaxine Other (See Comments)     Night terrors     Zoloft [Sertraline] Other (See Comments)     Head rush, hives and agitation     Family History  Family History   Problem Relation Age of Onset     Diabetes Mother         type 2     Hypertension Mother      Heart Disease Mother      Lipids Mother      Alcohol/Drug Father      Cancer Maternal Grandmother      Eye Disorder Maternal Grandfather      Heart Disease Maternal Grandfather      Cancer Maternal Grandfather      Cancer Paternal Grandmother      Prostate Cancer Paternal Grandfather      Fibromyalgia Brother      Other - See Comments Daughter         lupus     Kidney Disease Daughter      Kidney Disease Daughter      Social History   Social History     Tobacco Use     Smoking status: Never     Smokeless tobacco: Never   Vaping Use     Vaping Use: Never used   Substance  Use Topics     Alcohol use: Yes     Alcohol/week: 3.3 standard drinks     Types: 4 Cans of beer per week     Comment: rarely     Drug use: No         A medically appropriate review of systems was performed with pertinent positives and negatives noted in the HPI, and all other systems negative.    Physical Exam   BP: (!) 183/107  Pulse: 87  Temp: 98.8  F (37.1  C)  Resp: 16  SpO2: 95 %  Physical Exam  Constitutional:       General: He is awake. He is not in acute distress.     Appearance: Normal appearance. He is well-developed. He is not ill-appearing or toxic-appearing.   HENT:      Head: Atraumatic.   Eyes:      General: No scleral icterus.     Pupils: Pupils are equal, round, and reactive to light.   Cardiovascular:      Rate and Rhythm: Normal rate and regular rhythm.      Heart sounds: Normal heart sounds, S1 normal and S2 normal.   Pulmonary:      Effort: No respiratory distress.      Breath sounds: Normal breath sounds.   Abdominal:      General: Bowel sounds are normal.      Palpations: Abdomen is soft.      Tenderness: There is no abdominal tenderness.   Musculoskeletal:         General: No tenderness.   Skin:     General: Skin is warm.      Findings: Rash present. Rash is urticarial.      Comments: Scattered urticaria and a few patches over bilateral upper extremities.   Neurological:      Mental Status: He is alert and oriented to person, place, and time.      Cranial Nerves: Cranial nerves 2-12 are intact.      Sensory: Sensation is intact.      Motor: Motor function is intact.      Coordination: Coordination is intact. Finger-Nose-Finger Test normal.      Gait: Gait and tandem walk normal.   Psychiatric:         Mood and Affect: Mood is anxious.         Speech: Speech normal.         Behavior: Behavior is cooperative.         ED Course, Procedures, & Data      Procedures                     Results for orders placed or performed during the hospital encounter of 02/17/23   CTA Head Neck with Contrast      Status: None    Narrative    EXAM: CT HEAD W/O CONTRAST, CTA HEAD NECK W CONTRAST  2/17/2023 9:18  PM     HISTORY:  57 year old male who?presents to the emergency department  with headache dizziness, report of some difficulty walking, shakiness.    ?  COMPARISON:  MR 3/7/2022, CT 10/19/2017    TECHNIQUE:   HEAD CT: Using multidetector thin collimation helical acquisition  technique, axial, coronal and sagittal CT images from the skull base  to the vertex were obtained without intravenous contrast.   (topogram) image(s) also obtained and reviewed.    HEAD and NECK CTA: During rapid bolus intravenous injection of  nonionic contrast material, axial images were obtained using thin  collimation multidetector helical technique from the base of the upper  aortic arch through the [place] of [place]. This CT angiogram data was  reconstructed at thin intervals with mild overlap. Images were sent to  the 3D workstation, and 3D reconstructions were obtained. The axial  source images, multiplanar reformations, 3D reconstructions in both  maximum intensity projection display and volume rendered models were  reviewed, with reconstructions performed by the technologist.    CONTRAST: 75 mL Isovue 370    FINDINGS:  No acute intracranial hemorrhage, mass effect, or midline shift. No  acute loss of gray-white matter differentiation in the cerebral  hemispheres. Ventricles are proportionate to the cerebral sulci. Clear  basal cisterns. Small isodense lesion present in the right porous  acousticus measuring approximately 7 x 5 mm likely corresponds to the  small residual mass seen on MR dated 3/7/2022.     Postsurgical changes of the right temporal bone. . The visualized  portions of the paranasal sinuses and mastoid air cells are clear.  Grossly normal orbits.     Head CTA demonstrates no intracranial arterial aneurysm or stenosis.  There is a 2 mm triangular outpouching at the origin of the left  superior cerebellar artery  representing an infundibulum, best seen on  3-D MIP images.    Neck CTA demonstrates conventional aortic arch branching pattern and  patent great vessel origins. No internal carotid artery stenosis.  Minimal calcifications at the carotid bulbs bilaterally. No vertebral  artery stenosis.    No acute finding in the visualized neck soft tissues or in the  superior mediastinum/thorax.      Impression    IMPRESSION:   1. Head CT demonstrates no acuteinfarct or acute intracranial  hemorrhage. There is a small isodense lesion in the right porous  acousticus that corresponds to the small residual mass seen on MR  dated 3/7/2022.  2. Head CTA demonstrates no aneurysm or stenosis of the major  intracranial arteries.  3. Neck CTA demonstrates no stenosis of the major cervical arteries.    I have personally reviewed the examination and initial interpretation  and I agree with the findings.    MONICA SAM MD         SYSTEM ID:  [id number]   CT Head w/o Contrast     Status: None    Narrative    EXAM: CT HEAD W/O CONTRAST, CTA HEAD NECK W CONTRAST  2/17/2023 9:18  PM     HISTORY:  57 year old male who?presents to the emergency department  with headache dizziness, report of some difficulty walking, shakiness.    ?  COMPARISON:  MR 3/7/2022, CT 10/19/2017    TECHNIQUE:   HEAD CT: Using multidetector thin collimation helical acquisition  technique, axial, coronal and sagittal CT images from the skull base  to the vertex were obtained without intravenous contrast.   (topogram) image(s) also obtained and reviewed.    HEAD and NECK CTA: During rapid bolus intravenous injection of  nonionic contrast material, axial images were obtained using thin  collimation multidetector helical technique from the base of the upper  aortic arch through the [place] of [place]. This CT angiogram data was  reconstructed at thin intervals with mild overlap. Images were sent to  the 3D workstation, and 3D reconstructions were obtained. The axial  source  images, multiplanar reformations, 3D reconstructions in both  maximum intensity projection display and volume rendered models were  reviewed, with reconstructions performed by the technologist.    CONTRAST: 75 mL Isovue 370    FINDINGS:  No acute intracranial hemorrhage, mass effect, or midline shift. No  acute loss of gray-white matter differentiation in the cerebral  hemispheres. Ventricles are proportionate to the cerebral sulci. Clear  basal cisterns. Small isodense lesion present in the right porous  acousticus measuring approximately 7 x 5 mm likely corresponds to the  small residual mass seen on MR dated 3/7/2022.     Postsurgical changes of the right temporal bone. . The visualized  portions of the paranasal sinuses and mastoid air cells are clear.  Grossly normal orbits.     Head CTA demonstrates no intracranial arterial aneurysm or stenosis.  There is a 2 mm triangular outpouching at the origin of the left  superior cerebellar artery representing an infundibulum, best seen on  3-D MIP images.    Neck CTA demonstrates conventional aortic arch branching pattern and  patent great vessel origins. No internal carotid artery stenosis.  Minimal calcifications at the carotid bulbs bilaterally. No vertebral  artery stenosis.    No acute finding in the visualized neck soft tissues or in the  superior mediastinum/thorax.      Impression    IMPRESSION:   1. Head CT demonstrates no acuteinfarct or acute intracranial  hemorrhage. There is a small isodense lesion in the right porous  acousticus that corresponds to the small residual mass seen on MR  dated 3/7/2022.  2. Head CTA demonstrates no aneurysm or stenosis of the major  intracranial arteries.  3. Neck CTA demonstrates no stenosis of the major cervical arteries.    I have personally reviewed the examination and initial interpretation  and I agree with the findings.    MONICA SAM MD         SYSTEM ID:  [id number]   Comprehensive metabolic panel     Status: Normal    Result Value Ref Range    Sodium 139 136 - 145 mmol/L    Potassium 4.0 3.4 - 5.3 mmol/L    Chloride 104 98 - 107 mmol/L    Carbon Dioxide (CO2) 22 22 - 29 mmol/L    Anion Gap 13 7 - 15 mmol/L    Urea Nitrogen 13.0 6.0 - 20.0 mg/dL    Creatinine 1.03 0.67 - 1.17 mg/dL    Calcium 9.5 8.6 - 10.0 mg/dL    Glucose 98 70 - 99 mg/dL    Alkaline Phosphatase 73 40 - 129 U/L    AST 24 10 - 50 U/L    ALT 22 10 - 50 U/L    Protein Total 7.4 6.4 - 8.3 g/dL    Albumin 4.7 3.5 - 5.2 g/dL    Bilirubin Total 0.8 <=1.2 mg/dL    GFR Estimate 86 >60 mL/min/1.73m2   CBC with platelets and differential     Status: None   Result Value Ref Range    WBC Count 8.4 4.0 - 11.0 10e3/uL    RBC Count 5.22 4.40 - 5.90 10e6/uL    Hemoglobin 15.6 13.3 - 17.7 g/dL    Hematocrit 45.0 40.0 - 53.0 %    MCV 86 78 - 100 fL    MCH 29.9 26.5 - 33.0 pg    MCHC 34.7 31.5 - 36.5 g/dL    RDW 12.8 10.0 - 15.0 %    Platelet Count 207 150 - 450 10e3/uL    % Neutrophils 75 %    % Lymphocytes 17 %    % Monocytes 7 %    % Eosinophils 1 %    % Basophils 0 %    % Immature Granulocytes 0 %    NRBCs per 100 WBC 0 <1 /100    Absolute Neutrophils 6.2 1.6 - 8.3 10e3/uL    Absolute Lymphocytes 1.4 0.8 - 5.3 10e3/uL    Absolute Monocytes 0.6 0.0 - 1.3 10e3/uL    Absolute Eosinophils 0.1 0.0 - 0.7 10e3/uL    Absolute Basophils 0.0 0.0 - 0.2 10e3/uL    Absolute Immature Granulocytes 0.0 <=0.4 10e3/uL    Absolute NRBCs 0.0 10e3/uL   CBC with platelets differential     Status: None    Narrative    The following orders were created for panel order CBC with platelets differential.  Procedure                               Abnormality         Status                     ---------                               -----------         ------                     CBC with platelets and d...[[id number]]                      Final result                 Please view results for these tests on the individual orders.     Medications   sodium chloride (PF) 0.9% PF flush 90 mL (90 mLs Intravenous  Given 2/17/23 2109)   iopamidol (ISOVUE-370) solution 75 mL (75 mLs Intravenous Given 2/17/23 2109)     Labs Ordered and Resulted from Time of ED Arrival to Time of ED Departure   COMPREHENSIVE METABOLIC PANEL - Normal       Result Value    Sodium 139      Potassium 4.0      Chloride 104      Carbon Dioxide (CO2) 22      Anion Gap 13      Urea Nitrogen 13.0      Creatinine 1.03      Calcium 9.5      Glucose 98      Alkaline Phosphatase 73      AST 24      ALT 22      Protein Total 7.4      Albumin 4.7      Bilirubin Total 0.8      GFR Estimate 86     CBC WITH PLATELETS AND DIFFERENTIAL    WBC Count 8.4      RBC Count 5.22      Hemoglobin 15.6      Hematocrit 45.0      MCV 86      MCH 29.9      MCHC 34.7      RDW 12.8      Platelet Count 207      % Neutrophils 75      % Lymphocytes 17      % Monocytes 7      % Eosinophils 1      % Basophils 0      % Immature Granulocytes 0      NRBCs per 100 WBC 0      Absolute Neutrophils 6.2      Absolute Lymphocytes 1.4      Absolute Monocytes 0.6      Absolute Eosinophils 0.1      Absolute Basophils 0.0      Absolute Immature Granulocytes 0.0      Absolute NRBCs 0.0       CTA Head Neck with Contrast   Final Result   IMPRESSION:    1. Head CT demonstrates no acuteinfarct or acute intracranial   hemorrhage. There is a small isodense lesion in the right porous   acousticus that corresponds to the small residual mass seen on MR   dated 3/7/2022.   2. Head CTA demonstrates no aneurysm or stenosis of the major   intracranial arteries.   3. Neck CTA demonstrates no stenosis of the major cervical arteries.      I have personally reviewed the examination and initial interpretation   and I agree with the findings.      MONICA SAM MD            SYSTEM ID:  [id number]      CT Head w/o Contrast   Final Result   IMPRESSION:    1. Head CT demonstrates no acuteinfarct or acute intracranial   hemorrhage. There is a small isodense lesion in the right porous   acousticus that corresponds to the  small residual mass seen on MR   dated 3/7/2022.   2. Head CTA demonstrates no aneurysm or stenosis of the major   intracranial arteries.   3. Neck CTA demonstrates no stenosis of the major cervical arteries.      I have personally reviewed the examination and initial interpretation   and I agree with the findings.      MONICA SAM MD            SYSTEM ID:  [id number]             Medical Decision Making  The patient's presentation is strongly suggestive of an acute illness with systemic symptoms.    The patient's evaluation involved:  review of external note(s) from 2 sources (PCP notes for anxiety)  ordering and/or review of 3+ test(s) in this encounter (see separate area of note for details)  review of 1 test result(s) ordered prior to this encounter (last MRI brain)  strong consideration of a test (MRI brain) that was ultimately deferred    The patient's management involved only low risk treatment.      Assessment & Plan    Rajendra Gagnon is a 57 year old male who presents to the emergency department with headache dizziness, report of some difficulty walking, shakiness, all of which have resolved.  Patient well-appearing on exam, however he does have this history of the acoustic neuroma with residual tumor left.  Certainly, this is concerning and can explain some of his symptoms but not all of them.  The constellation of symptoms, the waxing and waning, the improvement, but normal neuro exam currently, all argue against a CNS process actively going on.  However, patient is concerned about his cerebral vasculature in addition to the tumor.  Given risk factors, certainly reasonable to perform a CT angio.  We will hold off on an MRI at this time, recommend he get that as an outpatient should his CT angio look reassuring.  Screening labs sent off as well.  Labs unrevealing and reassuring as is his CT angio.  When I reassessed him, he has having some urticaria, however he is otherwise feeling better than earlier today  when he was much more symptomatic.  He is reassured by the work-up we have done today, okay to discharge with return precautions given and recommendation that he follow-up with his PCP.  Anticipate that he will require psych follow-up which he is on board with.    I have reviewed the nursing notes. I have reviewed the findings, diagnosis, plan and need for follow up with the patient.    Discharge Medication List as of 2/17/2023  9:45 PM          Final diagnoses:   Acute nonintractable headache, unspecified headache type       Hamilton Whitaker MD PhD  [facility] EMERGENCY DEPARTMENT  2/17/2023     Hamilton Whitaker MD  02/17/23 0880

## 2023-02-18 NOTE — ED TRIAGE NOTES
Patient ambulatory to triage for headache. Patient state he has felt pressure in his head and dizziness.

## 2023-02-21 ENCOUNTER — ALLIED HEALTH/NURSE VISIT (OUTPATIENT)
Dept: ALLERGY | Facility: OTHER | Age: 58
End: 2023-02-21
Attending: PHYSICIAN ASSISTANT
Payer: COMMERCIAL

## 2023-02-21 DIAGNOSIS — J30.89 PERENNIAL ALLERGIC RHINITIS WITH SEASONAL VARIATION: Primary | ICD-10-CM

## 2023-02-21 DIAGNOSIS — J30.2 PERENNIAL ALLERGIC RHINITIS WITH SEASONAL VARIATION: Primary | ICD-10-CM

## 2023-02-21 PROCEDURE — 95117 IMMUNOTHERAPY INJECTIONS: CPT

## 2023-02-21 NOTE — PROGRESS NOTES
Prior to injection verified pt identity using pt name and date of birth.    Allergy injection/s given and charted on paper allergy flow sheet.  Patient left AMA, signing form, not staying for the observation period.    Ce Vitale RN on 2/21/2023 at 12:58 PM

## 2023-02-28 ENCOUNTER — ALLIED HEALTH/NURSE VISIT (OUTPATIENT)
Dept: ALLERGY | Facility: OTHER | Age: 58
End: 2023-02-28
Attending: PHYSICIAN ASSISTANT
Payer: COMMERCIAL

## 2023-02-28 DIAGNOSIS — J30.2 PERENNIAL ALLERGIC RHINITIS WITH SEASONAL VARIATION: Primary | ICD-10-CM

## 2023-02-28 DIAGNOSIS — J30.89 PERENNIAL ALLERGIC RHINITIS WITH SEASONAL VARIATION: Primary | ICD-10-CM

## 2023-02-28 PROCEDURE — 95117 IMMUNOTHERAPY INJECTIONS: CPT

## 2023-02-28 NOTE — PROGRESS NOTES
Prior to injection verified pt identity using pt name and date of birth.    Allergy injection/s given and charted on paper allergy flow sheet.  Patient left AMA, signing form, not staying for the observation period.    Marshall Walker RN on 2/28/2023 at 12:59 PM

## 2023-03-07 ENCOUNTER — ALLIED HEALTH/NURSE VISIT (OUTPATIENT)
Dept: ALLERGY | Facility: OTHER | Age: 58
End: 2023-03-07
Attending: PHYSICIAN ASSISTANT
Payer: COMMERCIAL

## 2023-03-07 DIAGNOSIS — J30.89 PERENNIAL ALLERGIC RHINITIS WITH SEASONAL VARIATION: Primary | ICD-10-CM

## 2023-03-07 DIAGNOSIS — J30.2 PERENNIAL ALLERGIC RHINITIS WITH SEASONAL VARIATION: Primary | ICD-10-CM

## 2023-03-07 PROCEDURE — 95117 IMMUNOTHERAPY INJECTIONS: CPT

## 2023-03-07 NOTE — PROGRESS NOTES
Prior to injection verified pt identity using pt name and date of birth.    Allergy injection/s given and charted on paper allergy flow sheet.  Patient left AMA, signing form, not staying for the observation period.    Ce Vitale RN on 3/7/2023 at 1:09 PM

## 2023-03-14 ENCOUNTER — TELEPHONE (OUTPATIENT)
Dept: ALLERGY | Facility: OTHER | Age: 58
End: 2023-03-14

## 2023-03-14 DIAGNOSIS — L50.9 URTICARIA: Primary | ICD-10-CM

## 2023-03-14 NOTE — TELEPHONE ENCOUNTER
Referral to  in [place], [place] to see Dr. Armstrong regarding ongoing allergy issues.     Ce Vitale RN on 3/14/2023 at 4:29 PM

## 2023-03-14 NOTE — TELEPHONE ENCOUNTER
Patient did not receive his allergy injection today due to having a break out of hives on his right arm. He stated that he was taking 2 tablets of Benadryl every 4-6 hours as it's directed on the package. Patient said that the hives were worse the days prior. Educated patient that if his hives worsen that he should go to the emergency room.     Spoke with ENT provider regarding patient. ENT provider directed RN to call patient to have him see Dr. Armstrong an allergist in [place] at [facility]. Patient was called and directed to set up an appt with this provider regarding ongoing hive breakouts.     Ce Vitale RN on 3/14/2023 at 1:12 PM

## 2023-03-15 NOTE — TELEPHONE ENCOUNTER
Patient calls and states that he was told that Dr. Armstrong does not go to [place] anymore.  I left a message asking if he would still like to be seen by any one else there?

## 2023-03-21 ENCOUNTER — ALLIED HEALTH/NURSE VISIT (OUTPATIENT)
Dept: ALLERGY | Facility: OTHER | Age: 58
End: 2023-03-21
Attending: PHYSICIAN ASSISTANT
Payer: COMMERCIAL

## 2023-03-21 DIAGNOSIS — J30.2 PERENNIAL ALLERGIC RHINITIS WITH SEASONAL VARIATION: Primary | ICD-10-CM

## 2023-03-21 DIAGNOSIS — J30.89 PERENNIAL ALLERGIC RHINITIS WITH SEASONAL VARIATION: Primary | ICD-10-CM

## 2023-03-21 PROCEDURE — 95117 IMMUNOTHERAPY INJECTIONS: CPT

## 2023-03-21 NOTE — PROGRESS NOTES
Prior to injection verified pt identity using pt name and date of birth.    Allergy injection/s given and charted on paper allergy flow sheet.  Patient left AMA, signing form, not staying for the observation period.    Ce Vitale RN on 3/21/2023 at 1:09 PM

## 2023-03-28 ENCOUNTER — ALLIED HEALTH/NURSE VISIT (OUTPATIENT)
Dept: ALLERGY | Facility: OTHER | Age: 58
End: 2023-03-28
Attending: PHYSICIAN ASSISTANT
Payer: COMMERCIAL

## 2023-03-28 DIAGNOSIS — J30.89 PERENNIAL ALLERGIC RHINITIS WITH SEASONAL VARIATION: Primary | ICD-10-CM

## 2023-03-28 DIAGNOSIS — J30.2 PERENNIAL ALLERGIC RHINITIS WITH SEASONAL VARIATION: Primary | ICD-10-CM

## 2023-03-28 PROCEDURE — 95117 IMMUNOTHERAPY INJECTIONS: CPT

## 2023-03-28 NOTE — PROGRESS NOTES
Prior to injection verified pt identity using pt name and date of birth.    Allergy injection/s given and charted on paper allergy flow sheet.  Patient left AMA, signing form, not staying for the observation period.    Marshall Walker RN on 3/28/2023 at 1:13 PM

## 2023-04-05 ENCOUNTER — ALLIED HEALTH/NURSE VISIT (OUTPATIENT)
Dept: ALLERGY | Facility: OTHER | Age: 58
End: 2023-04-05
Attending: PHYSICIAN ASSISTANT
Payer: COMMERCIAL

## 2023-04-05 DIAGNOSIS — J30.89 PERENNIAL ALLERGIC RHINITIS WITH SEASONAL VARIATION: Primary | ICD-10-CM

## 2023-04-05 DIAGNOSIS — J30.2 PERENNIAL ALLERGIC RHINITIS WITH SEASONAL VARIATION: Primary | ICD-10-CM

## 2023-04-05 PROCEDURE — 95117 IMMUNOTHERAPY INJECTIONS: CPT

## 2023-04-05 NOTE — PROGRESS NOTES
Prior to injection verified pt identity using pt name and date of birth.    Allergy injection/s given and charted on paper allergy flow sheet.  Patient left AMA, signing form, not staying for the observation period.    Marshall Walker RN on 4/5/2023 at 4:07 PM

## 2023-04-24 ENCOUNTER — ALLIED HEALTH/NURSE VISIT (OUTPATIENT)
Dept: ALLERGY | Facility: OTHER | Age: 58
End: 2023-04-24
Attending: PHYSICIAN ASSISTANT
Payer: COMMERCIAL

## 2023-04-24 DIAGNOSIS — J30.2 PERENNIAL ALLERGIC RHINITIS WITH SEASONAL VARIATION: Primary | ICD-10-CM

## 2023-04-24 DIAGNOSIS — J30.89 PERENNIAL ALLERGIC RHINITIS WITH SEASONAL VARIATION: Primary | ICD-10-CM

## 2023-04-24 PROCEDURE — 95117 IMMUNOTHERAPY INJECTIONS: CPT

## 2023-04-24 NOTE — PROGRESS NOTES
Prior to injection verified pt identity using pt name and date of birth.    Allergy injection/s given and charted on paper allergy flow sheet.  Patient left AMA, signing form, not staying for the observation period.    Marshall Walker RN on 4/24/2023 at 4:10 PM

## 2023-05-08 ENCOUNTER — ALLIED HEALTH/NURSE VISIT (OUTPATIENT)
Dept: ALLERGY | Facility: OTHER | Age: 58
End: 2023-05-08
Attending: PHYSICIAN ASSISTANT
Payer: COMMERCIAL

## 2023-05-08 DIAGNOSIS — J30.2 PERENNIAL ALLERGIC RHINITIS WITH SEASONAL VARIATION: Primary | ICD-10-CM

## 2023-05-08 DIAGNOSIS — J30.89 PERENNIAL ALLERGIC RHINITIS WITH SEASONAL VARIATION: Primary | ICD-10-CM

## 2023-05-08 PROCEDURE — 95117 IMMUNOTHERAPY INJECTIONS: CPT

## 2023-05-08 NOTE — PROGRESS NOTES
Prior to injection verified pt identity using pt name and date of birth.    Patient had a few red raised bumps on his right forearm which has been around his baseline. Patient did state he has been having an increase of allergy symptoms. Stated that he has been taking benadryl every few hours and Zyrtec but has stopped the Zyrtec due to feeling weird. Patient said he is seeing the allergist down in [place] today after a referral was made by ENT provider. Patient then asked if these medications could cause depression. Educated patient that he should contact his ENT or primary provider regarding this concern.     Allergy injection/s given and charted on paper allergy flow sheet.  Patient left AMA, signing form, not staying for the observation period.      Ce Vitale RN on 5/8/2023 at 2:16 PM

## 2023-05-15 ENCOUNTER — OFFICE VISIT (OUTPATIENT)
Dept: ALLERGY | Facility: OTHER | Age: 58
End: 2023-05-15
Attending: PHYSICIAN ASSISTANT
Payer: COMMERCIAL

## 2023-05-15 DIAGNOSIS — J30.2 PERENNIAL ALLERGIC RHINITIS WITH SEASONAL VARIATION: Primary | ICD-10-CM

## 2023-05-15 DIAGNOSIS — J30.89 PERENNIAL ALLERGIC RHINITIS WITH SEASONAL VARIATION: Primary | ICD-10-CM

## 2023-05-15 PROCEDURE — 95117 IMMUNOTHERAPY INJECTIONS: CPT

## 2023-05-15 RX ORDER — CETIRIZINE HYDROCHLORIDE 10 MG/1
10 TABLET ORAL 2 TIMES DAILY
COMMUNITY
End: 2023-09-01

## 2023-05-15 RX ORDER — FAMOTIDINE 20 MG/1
20 TABLET, FILM COATED ORAL 2 TIMES DAILY
COMMUNITY
End: 2024-04-02

## 2023-05-15 RX ORDER — HYDROXYZINE HYDROCHLORIDE 25 MG/1
25 TABLET, FILM COATED ORAL 3 TIMES DAILY PRN
COMMUNITY
End: 2024-04-02

## 2023-05-15 NOTE — PROGRESS NOTES
Prior to injection verified pt identity using pt name and date of birth.    Allergy injection/s given and charted on paper allergy flow sheet.  Patient left AMA, signing form, not staying for the observation period.    Marshall Walker RN on 5/15/2023 at 1:29 PM

## 2023-05-22 ENCOUNTER — OFFICE VISIT (OUTPATIENT)
Dept: ALLERGY | Facility: OTHER | Age: 58
End: 2023-05-22
Attending: PHYSICIAN ASSISTANT
Payer: COMMERCIAL

## 2023-05-22 DIAGNOSIS — J30.2 PERENNIAL ALLERGIC RHINITIS WITH SEASONAL VARIATION: Primary | ICD-10-CM

## 2023-05-22 DIAGNOSIS — J30.89 PERENNIAL ALLERGIC RHINITIS WITH SEASONAL VARIATION: Primary | ICD-10-CM

## 2023-05-22 PROCEDURE — 95117 IMMUNOTHERAPY INJECTIONS: CPT

## 2023-05-22 NOTE — PROGRESS NOTES
Prior to injection verified pt identity using pt name and date of birth.    Allergy injection/s given and charted on paper allergy flow sheet.  Patient left AMA, signing form, not staying for the observation period.    Marshall Walker RN on 5/22/2023 at 3:03 PM

## 2023-05-30 ENCOUNTER — OFFICE VISIT (OUTPATIENT)
Dept: ALLERGY | Facility: OTHER | Age: 58
End: 2023-05-30
Attending: PHYSICIAN ASSISTANT
Payer: COMMERCIAL

## 2023-05-30 DIAGNOSIS — J30.2 PERENNIAL ALLERGIC RHINITIS WITH SEASONAL VARIATION: Primary | ICD-10-CM

## 2023-05-30 DIAGNOSIS — J30.89 PERENNIAL ALLERGIC RHINITIS WITH SEASONAL VARIATION: Primary | ICD-10-CM

## 2023-05-30 PROCEDURE — 95117 IMMUNOTHERAPY INJECTIONS: CPT

## 2023-05-30 NOTE — PROGRESS NOTES
Prior to injection verified pt identity using pt name and date of birth.    Patient stated today he had some congestion/stuffiness with breathing with all of the stuff in the air, denied any difficulties with breathing. Allergy injection/s given and charted on paper allergy flow sheet.  Patient left AMA, signing form, not staying for the observation period.    Ce Vitale RN on 5/30/2023 at 3:06 PM

## 2023-05-31 ENCOUNTER — ALLIED HEALTH/NURSE VISIT (OUTPATIENT)
Dept: ALLERGY | Facility: OTHER | Age: 58
End: 2023-05-31
Attending: PHYSICIAN ASSISTANT
Payer: COMMERCIAL

## 2023-05-31 DIAGNOSIS — J30.2 PERENNIAL ALLERGIC RHINITIS WITH SEASONAL VARIATION: Primary | ICD-10-CM

## 2023-05-31 DIAGNOSIS — J30.89 PERENNIAL ALLERGIC RHINITIS WITH SEASONAL VARIATION: Primary | ICD-10-CM

## 2023-05-31 PROCEDURE — 95165 ANTIGEN THERAPY SERVICES: CPT | Performed by: PHYSICIAN ASSISTANT

## 2023-05-31 NOTE — PROGRESS NOTES
Allergy serum is mixed today at schedule Blue 3 of 4,  into  2  (5 ml)  multi dose vial/vials.    Allergens included were:    Ragweed  0.2 ml of dilution # 0  Pigweed  0.2 ml of dilution # 1  Mugwort 0.2 ml of dilution # 0  Kochia  0.2 ml of dilution # 0  Russian Thistle 0.2 ml of dilution # 1  Saurav Grass 0.2 ml of dilution # 1  Birch mix 0.2 ml of dilution # 1  Maple Mix 0.2 of dilution # 1  Elm Mix 0.2 ml of dilution # 1  Oak Mix 0.2 ml of dilution # 1  Osbaldo Mix 0.2 ml of dilution # 1  Pine Mix 0.2 ml of dilution # 1  Eastern [place] 0.2 ml of dilution # 0  Black [place] 0.2 ml of dilution # 1  Aspen 0.2 ml of dilution # 0  Red [place] 0.2 ml of dilution # 0    Alternaria 0.2 ml of dilution # 2  Aspergillus 0.2 ml of dilution # 1  Hormodendrum 0.2 ml of dilution # 1  Helminthosporium 0.2 ml of dilution # 0  Penicillium 0.2 ml of dilution # 1  Epicoccum 0.2 ml of dilution # 1  Fusarium 0.2 ml of dilution # 1  Mucor 0.2 ml of dilution # 0  Grain Smut 0.2 ml of dilution # 0  Grass Smut 0.2 ml of dilution # 0  Cat 0.2 ml of dilution # 1  Dog 0.2 ml of dilution # 1  Feather Mix 0.2 ml of dilution # 0  Dust Mite Mix 0.2 ml of dilution # 2  Horse 0.2 ml of dilution # 0    Ce Vitale RN on 5/31/2023 at 9:50 AM

## 2023-06-13 ENCOUNTER — ALLIED HEALTH/NURSE VISIT (OUTPATIENT)
Dept: ALLERGY | Facility: OTHER | Age: 58
End: 2023-06-13
Attending: PHYSICIAN ASSISTANT
Payer: COMMERCIAL

## 2023-06-13 DIAGNOSIS — J30.9 ALLERGIC RHINITIS: Primary | ICD-10-CM

## 2023-06-13 PROCEDURE — 95117 IMMUNOTHERAPY INJECTIONS: CPT

## 2023-06-13 NOTE — PROGRESS NOTES
Prior to injection, verified patient identity by using patient's name and date of birth.  Allergy injection given and charted on paper allergy flow sheet.     Due to injection administration, patient instructed to remain in clinic for 15-30 minutes  afterwards, and to report any adverse reaction to me immediately.  Patient experienced a mild  Reaction on right and left arm during the SAFETY VIAL DOSE 0.01ML. OF THE BLUE TOP. Then patient was given dose of 0.05ml to start buildup and left AMA knowing the risks    Jeri Morales LPN on 6/13/2023 at 4:34 PM

## 2023-06-21 ENCOUNTER — ALLIED HEALTH/NURSE VISIT (OUTPATIENT)
Dept: ALLERGY | Facility: OTHER | Age: 58
End: 2023-06-21
Attending: PHYSICIAN ASSISTANT
Payer: COMMERCIAL

## 2023-06-21 DIAGNOSIS — J30.9 ALLERGIC RHINITIS: Primary | ICD-10-CM

## 2023-06-21 PROCEDURE — 95117 IMMUNOTHERAPY INJECTIONS: CPT

## 2023-06-21 NOTE — PROGRESS NOTES
Allergy injection/s given and charted on paper allergy flow sheet.  Patient experienced unknown   reaction.  Due to injection administration, patient instructed to remain in clinic for 15 minutes  afterwards, and to report any adverse reaction to me immediately.  Pt signed out AMA 1636.  EVA TARIQ LPN

## 2023-07-05 ENCOUNTER — ALLIED HEALTH/NURSE VISIT (OUTPATIENT)
Dept: ALLERGY | Facility: OTHER | Age: 58
End: 2023-07-05
Attending: PHYSICIAN ASSISTANT
Payer: COMMERCIAL

## 2023-07-05 DIAGNOSIS — J30.9 ALLERGIC RHINITIS: Primary | ICD-10-CM

## 2023-07-05 PROCEDURE — 95117 IMMUNOTHERAPY INJECTIONS: CPT

## 2023-07-05 NOTE — PROGRESS NOTES
Clinic Administered Medication Documentation      Injectable Medication Documentation    Is there an active order (written within the past 365 days, with administrations remaining, not ) in the chart? Yes.     Patient was given Paptient recevied allergy injection.  Vial 1 blue vial was 0.15 in the right arm.  Vial 2 blue vial ws 0.15 in left arm.  Patient tolerated injection and had no concerns.. Prior to medication administration, verified patient's identity using patient s name and date of birth. Please see MAR and medication order for additional information. Patient instructed to remain in clinic for 15 minutes and report any adverse reaction to staff immediately.    Vial/Syringe: Single dose vial. Was entire vial of medication used? Yes  Was this medication supplied by the patient? No  Is this a medication the patient will need to receive again? Yes. Verified that the patient has refills remaining in their prescription.

## 2023-07-24 ENCOUNTER — ALLIED HEALTH/NURSE VISIT (OUTPATIENT)
Dept: ALLERGY | Facility: OTHER | Age: 58
End: 2023-07-24
Attending: PHYSICIAN ASSISTANT
Payer: COMMERCIAL

## 2023-07-24 DIAGNOSIS — J30.9 ALLERGIC RHINITIS: Primary | ICD-10-CM

## 2023-07-24 PROCEDURE — 95117 IMMUNOTHERAPY INJECTIONS: CPT

## 2023-07-24 NOTE — PROGRESS NOTES
Allergy injection/s given and charted on paper allergy flow sheet.  Patient experienced unknown reaction.  Due to injection administration, patient instructed to remain in clinic for 15 minutes  afterwards, and to report any adverse reaction to me immediately.  Pt signed out AMA 1504.  EVA TARIQ LPN

## 2023-07-28 ENCOUNTER — TELEPHONE (OUTPATIENT)
Dept: OTOLARYNGOLOGY | Facility: OTHER | Age: 58
End: 2023-07-28

## 2023-07-28 NOTE — TELEPHONE ENCOUNTER
7/28/23 [facility] for pt to call back to schedule an appointment with Jeanna Celeste for an allergy follow up.    Ellie Blanchard

## 2023-08-01 NOTE — TELEPHONE ENCOUNTER
8/1/23- attempt #2:  Outcome: LVM to call back to schedule 6 MO follow up with Jeanna Celeste for allergies.

## 2023-08-07 ENCOUNTER — ALLIED HEALTH/NURSE VISIT (OUTPATIENT)
Dept: ALLERGY | Facility: OTHER | Age: 58
End: 2023-08-07
Attending: PHYSICIAN ASSISTANT
Payer: COMMERCIAL

## 2023-08-07 DIAGNOSIS — J30.9 ALLERGIC RHINITIS: Primary | ICD-10-CM

## 2023-08-07 PROCEDURE — 95117 IMMUNOTHERAPY INJECTIONS: CPT

## 2023-08-07 NOTE — PROGRESS NOTES
Allergy injection/s given and charted on paper allergy flow sheet.  Patient experienced unknown reaction.  Due to injection administration, patient instructed to remain in clinic for 15 minutes  afterwards, and to report any adverse reaction to me immediately.  Pt signed out AMA 1353.  EVA TARIQ LPN

## 2023-08-11 DIAGNOSIS — J30.89 PERENNIAL ALLERGIC RHINITIS WITH SEASONAL VARIATION: Primary | ICD-10-CM

## 2023-08-11 DIAGNOSIS — J30.2 PERENNIAL ALLERGIC RHINITIS WITH SEASONAL VARIATION: Primary | ICD-10-CM

## 2023-08-11 NOTE — TELEPHONE ENCOUNTER
Please sign if appropriate, patient current SCIT order good til 8/22/2023. LOV was 8/17/22, has an upcoming appointment on 9/1/2023. Thank you.

## 2023-08-14 ENCOUNTER — ALLIED HEALTH/NURSE VISIT (OUTPATIENT)
Dept: ALLERGY | Facility: OTHER | Age: 58
End: 2023-08-14
Attending: PHYSICIAN ASSISTANT
Payer: COMMERCIAL

## 2023-08-14 ENCOUNTER — TELEPHONE (OUTPATIENT)
Dept: ALLERGY | Facility: OTHER | Age: 58
End: 2023-08-14

## 2023-08-14 DIAGNOSIS — J30.2 PERENNIAL ALLERGIC RHINITIS WITH SEASONAL VARIATION: Primary | ICD-10-CM

## 2023-08-14 DIAGNOSIS — J30.89 PERENNIAL ALLERGIC RHINITIS WITH SEASONAL VARIATION: Primary | ICD-10-CM

## 2023-08-14 DIAGNOSIS — J30.9 ALLERGIC RHINITIS: Primary | ICD-10-CM

## 2023-08-14 PROCEDURE — 95117 IMMUNOTHERAPY INJECTIONS: CPT

## 2023-08-14 RX ORDER — EPINEPHRINE 0.3 MG/.3ML
0.3 INJECTION SUBCUTANEOUS PRN
Qty: 2 EACH | Refills: 1 | Status: SHIPPED | OUTPATIENT
Start: 2023-08-14

## 2023-08-14 NOTE — PROGRESS NOTES
Allergy injection/s given and charted on paper allergy flow sheet.  Patient experienced unknown reaction.  Due to injection administration, patient instructed to remain in clinic for 15 minutes  afterwards, and to report any adverse reaction to me immediately.  Pt signed out AMA 1542.  EVA TARIQ LPN

## 2023-08-21 ENCOUNTER — ALLIED HEALTH/NURSE VISIT (OUTPATIENT)
Dept: ALLERGY | Facility: OTHER | Age: 58
End: 2023-08-21
Attending: PHYSICIAN ASSISTANT
Payer: COMMERCIAL

## 2023-08-21 DIAGNOSIS — J30.9 ALLERGIC RHINITIS: Primary | ICD-10-CM

## 2023-08-21 PROCEDURE — 95117 IMMUNOTHERAPY INJECTIONS: CPT

## 2023-08-21 NOTE — PROGRESS NOTES
Allergy injection/s given and charted on paper allergy flow sheet.  Patient experienced unknown reaction. Patient signed out AMA form at 1445.    Due to injection administration, patient instructed to remain in clinic for 30 minutes  afterwards, and to report any adverse reaction to me immediately. Patient signed out AMA form at 1445.    Clinic Administered Medication Documentation      Injectable Medication Documentation    Is there an active order (written within the past 365 days, with administrations remaining, not ) in the chart? Yes.     Patient was given  allergy injection of 0.25 from blue vial 1 given in the right arm and allergy injection of 0.25 ml from blue vial 2 given in the left arm . Prior to medication administration, verified patient's identity using patient s name and date of birth. Please see MAR and medication order for additional information. Patient instructed to remain in clinic for 15 minutes, report any adverse reaction to staff immediately, remain in clinic for 15 minutes and report any adverse reaction to staff immediately but patient declined, and patient signed out AMA form at 1445, informed to seek medical attention/help if needing to use their Epipen or have any symptoms of a reaction, aware and acknowledged .    Vial/Syringe: Single dose vial. Was entire vial of medication used? Yes  Was this medication supplied by the patient? No  Is this a medication the patient will need to receive again? Yes. Verified that the patient has refills remaining in their prescription.

## 2023-08-28 ENCOUNTER — ALLIED HEALTH/NURSE VISIT (OUTPATIENT)
Dept: ALLERGY | Facility: OTHER | Age: 58
End: 2023-08-28
Attending: PHYSICIAN ASSISTANT
Payer: COMMERCIAL

## 2023-08-28 DIAGNOSIS — J30.9 ALLERGIC RHINITIS: Primary | ICD-10-CM

## 2023-08-28 PROCEDURE — 95117 IMMUNOTHERAPY INJECTIONS: CPT

## 2023-08-28 NOTE — PROGRESS NOTES
Allergy injection/s given and charted on paper allergy flow sheet.  Patient experienced unknown reaction.  Due to injection administration, patient instructed to remain in clinic for 15 minutes  afterwards, and to report any adverse reaction to me immediately.  Pt signed out AMA 1518.  EVA TARIQ LPN

## 2023-09-01 ENCOUNTER — OFFICE VISIT (OUTPATIENT)
Dept: OTOLARYNGOLOGY | Facility: OTHER | Age: 58
End: 2023-09-01
Attending: PHYSICIAN ASSISTANT
Payer: COMMERCIAL

## 2023-09-01 VITALS
SYSTOLIC BLOOD PRESSURE: 136 MMHG | RESPIRATION RATE: 22 BRPM | HEART RATE: 82 BPM | WEIGHT: 250 LBS | TEMPERATURE: 98.2 F | OXYGEN SATURATION: 96 % | BODY MASS INDEX: 32.08 KG/M2 | HEIGHT: 74 IN | DIASTOLIC BLOOD PRESSURE: 82 MMHG

## 2023-09-01 DIAGNOSIS — J34.3 NASAL TURBINATE HYPERTROPHY: ICD-10-CM

## 2023-09-01 DIAGNOSIS — L50.9 URTICARIA: Primary | ICD-10-CM

## 2023-09-01 DIAGNOSIS — J30.2 PERENNIAL ALLERGIC RHINITIS WITH SEASONAL VARIATION: ICD-10-CM

## 2023-09-01 DIAGNOSIS — J30.89 PERENNIAL ALLERGIC RHINITIS WITH SEASONAL VARIATION: ICD-10-CM

## 2023-09-01 DIAGNOSIS — L50.8 CHRONIC URTICARIA: ICD-10-CM

## 2023-09-01 PROCEDURE — 99214 OFFICE O/P EST MOD 30 MIN: CPT | Performed by: PHYSICIAN ASSISTANT

## 2023-09-01 RX ORDER — MONTELUKAST SODIUM 10 MG/1
10 TABLET ORAL AT BEDTIME
Qty: 90 TABLET | Refills: 4 | Status: SHIPPED | OUTPATIENT
Start: 2023-09-01

## 2023-09-01 RX ORDER — CETIRIZINE HYDROCHLORIDE 10 MG/1
10 TABLET ORAL 2 TIMES DAILY
Qty: 180 TABLET | Refills: 4 | Status: SHIPPED | OUTPATIENT
Start: 2023-09-01

## 2023-09-01 RX ORDER — MOMETASONE FUROATE MONOHYDRATE 50 UG/1
2 SPRAY, METERED NASAL DAILY
Qty: 30 G | Refills: 4 | Status: SHIPPED | OUTPATIENT
Start: 2023-09-01 | End: 2024-04-02

## 2023-09-01 ASSESSMENT — PAIN SCALES - GENERAL: PAINLEVEL: NO PAIN (0)

## 2023-09-01 NOTE — PROGRESS NOTES
Chief Complaint   Patient presents with    Follow Up     6 month follow up for Allergies      MQT-8/17/2022  Dilution #6-dust  Dilution #5-pigweed,  Birch, Granite, Alternaria, cat  Dilution #2-distal, grass, maple, elm, oak, gayle, molds, dog     Today, he presents for allergy shot follow up. He has been using Zyrtec daily and Singulair. He may take Zyrtec twice a day PRN.   He has been taking Vit C with improvement.   Rajendra has been cleaning his parents house and noticing increase in the allergies/ congestion.     He has intermittent urticaria PRN but has been improved compared to prior.   He has felt there has been some progress since his initial visit.   He is on build up, currently Vial #3/4.   He is tolerating weekly injections without concerns.   Epipens are up to date.          He has had previous work up in the past with Dr Mota, Dr Lundberg and Dr Boggs for urticaria.  He was allergy tested in April 2009, moderate sensitivities to grass, birch, dust, alternaria, thistle, elm, oak, grain smut and dog,  Low sensitives to ragweed maple, aspergillus, pigweed, kochia, gayle, pine.      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  He was treated with 3 months of keflex.   He has been treated with Zyrtec in the past with improvement in hives/angioedema symptoms.  In reviewing past medical records it was suggested that this could be idiopathic urticaria but definitive diagnosis was not made.  He did have a normal C1 inhibitor in April 2008.         Past Medical History:   Diagnosis Date    Dysthymia 3/26/2012    Hearing loss in right ear 3/14/2012    History of atrial fibrillation 4/23/2012    Hypertriglyceridemia     Right acoustic neuroma 3/14/2012    Tinnitus, unspecified 3/14/2012    Umbilical hernia without obstruction and without gangrene 5/11/2016    Varicose veins     Varicose veins of both lower extremities 5/12/2017        Allergies  "  Allergen Reactions    Aspartame Nausea    Venlafaxine Other (See Comments)     Night terrors    Zoloft [Sertraline] Other (See Comments)     Head rush, hives and agitation     Current Outpatient Medications   Medication    Acetaminophen (TYLENOL PO)    Ascorbic Acid (VITAMIN C) 500 MG CHEW    Calcium-Magnesium-Zinc 333-133-5 MG TABS per tablet    cetirizine (ZYRTEC) 10 MG tablet    DiphenhydrAMINE HCl (BENADRYL PO)    EPINEPHrine (ANY BX GENERIC EQUIV) 0.3 MG/0.3ML injection 2-pack    EPINEPHrine (ANY BX GENERIC EQUIV) 0.3 MG/0.3ML injection 2-pack    famotidine (PEPCID) 20 MG tablet    fexofenadine (ALLEGRA) 180 MG tablet    hydrOXYzine (ATARAX) 25 MG tablet    metroNIDAZOLE (METROGEL) 0.75 % external gel    montelukast (SINGULAIR) 10 MG tablet    Multiple Vitamins-Minerals (MULTIVITAMIN OR)    ORDER FOR ALLERGEN IMMUNOTHERAPY    triamcinolone (KENALOG) 0.1 % external cream    vitamin B complex with vitamin C (STRESS TAB) tablet    VITAMIN D, CHOLECALCIFEROL, PO    vitamin E 400 units TABS     No current facility-administered medications for this visit.     ROS- SEE HPI  /82 (BP Location: Left arm, Patient Position: Sitting, Cuff Size: Adult Large)   Pulse 82   Temp 98.2  F (36.8  C) (Tympanic)   Resp 22   Ht 1.88 m (6' 2\")   Wt 113.4 kg (250 lb)   SpO2 96%   BMI 32.10 kg/m      General - The patient is well nourished and well developed, and appears to have good nutritional status.  Alert and oriented to person and place, answers questions and cooperates with examination appropriately.   Head and Face - Normocephalic and atraumatic, with no gross asymmetry noted.  The facial nerve is intact, with strong symmetric movements.  Voice and Breathing - The patient was breathing comfortably without the use of accessory muscles. There was no wheezing, stridor, or stertor.  The patients voice was clear and strong, and had appropriate pitch and quality.  Ears -The external auditory canals are patent, the " tympanic membranes are intact without effusion, retraction or mass.  Bony landmarks are intact.  Eyes - Extraocular movements intact, and the pupils were reactive to light.  Sclera were not icteric or injected, conjunctiva were pink and moist.  Mouth - Examination of the oral cavity showed pink, healthy oral mucosa. No lesions or ulcerations noted.  The tongue was mobile and midline, and the dentition were in good condition.    Throat - The walls of the oropharynx were smooth, pink, moist, symmetric, and had no lesions or ulcerations.  The tonsillar pillars and soft palate were symmetric.  The uvula was midline on elevation.  FTP IIb  Neck - Normal midline excursion of the laryngotracheal complex during swallowing.  Full range of motion on passive movement.  Palpation of the occipital, submental, submandibular, internal jugular chain, and supraclavicular nodes did not demonstrate any abnormal lymph nodes or masses.  Palpation of the thyroid was soft and smooth, with no nodules or goiter appreciated.  The trachea was mobile and midline.  Nose - External contour is symmetric, no gross deflection or scars.  Nasal mucosa is pink and moist with no abnormal mucus.  No polyps, masses, or purulence noted on examination.  Nasal turbinate hypertrophy. Mild nasal valve collapse.         ASSESSMENT/ PLAN:    ICD-10-CM    1. Urticaria  L50.9       2. Perennial allergic rhinitis with seasonal variation  J30.89 montelukast (SINGULAIR) 10 MG tablet    J30.2 cetirizine (ZYRTEC) 10 MG tablet     mometasone (NASONEX) 50 MCG/ACT nasal spray      3. Chronic urticaria  L50.8 montelukast (SINGULAIR) 10 MG tablet     cetirizine (ZYRTEC) 10 MG tablet      4. Nasal turbinate hypertrophy  J34.3 mometasone (NASONEX) 50 MCG/ACT nasal spray            Doing well at this time  Continue with allergy shots, maintain build up per protocol.   Continue with Zyrtec 1-2 times daily and Singulair.   Refilled medications for 1 year  Start a trial of  Nasonex 2 sprays to each nostril daily. If worsening nasal congestion, may consider further options.     Epipen current  Annual follow up for SCIT.       Jeanna Celeste PA-C  ENT  [facility], [place]

## 2023-09-01 NOTE — PATIENT INSTRUCTIONS
Continue with allergy shots.   Maintain weekly injections at this time.     Use Zyrtec daily and twice a day as needed  Continue with Singulair at nighttime.   Start a trial of Nasonex 2 sprays to each nostril.    Follow up in 1 year.         Thank you for allowing Jeanna Celeste PA-C and our ENT team to participate in your care.  If your medications are too expensive, please give the nurse a call.  We can possibly change this medication.  If you have a scheduling or an appointment question please contact our Health Unit Coordinator at [phone], Ext. [phone].    ALL nursing questions or concerns can be directed to your ENT nurse at: [phone] [place]

## 2023-09-01 NOTE — LETTER
9/1/2023         RE: Rajendra Gagnon  [address]  [place] [address]        Dear Colleague,    Thank you for referring your patient, Rajendra Gagnon, to the [facility]. Please see a copy of my visit note below.    Chief Complaint   Patient presents with     Follow Up     6 month follow up for Allergies      MQT-8/17/2022  Dilution #6-dust  Dilution #5-pigweed,  Birch, [place], Alternaria, cat  Dilution #2-distal, grass, maple, elm, oak, gayle, molds, dog     Today, he presents for allergy shot follow up. He has been using Zyrtec daily and Singulair. He may take Zyrtec twice a day PRN.   He has been taking Vit C with improvement.   Rajendra has been cleaning his parents house and noticing increase in the allergies/ congestion.     He has intermittent urticaria PRN but has been improved compared to prior.   He has felt there has been some progress since his initial visit.   He is on build up, currently Vial #3/4.   He is tolerating weekly injections without concerns.   Epipens are up to date.          He has had previous work up in the past with Dr Mota, Dr Lundberg and Dr Boggs for urticaria.  He was allergy tested in April 2009, moderate sensitivities to grass, birch, dust, alternaria, thistle, elm, oak, grain smut and dog,  Low sensitives to ragweed maple, aspergillus, pigweed, kochia, gayle, pine.      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  He was treated with 3 months of keflex.   He has been treated with Zyrtec in the past with improvement in hives/angioedema symptoms.  In reviewing past medical records it was suggested that this could be idiopathic urticaria but definitive diagnosis was not made.  He did have a normal C1 inhibitor in April 2008.         Past Medical History:   Diagnosis Date     Dysthymia 3/26/2012     Hearing loss in right ear 3/14/2012     History of atrial fibrillation 4/23/2012  "    Hypertriglyceridemia      Right acoustic neuroma 3/14/2012     Tinnitus, unspecified 3/14/2012     Umbilical hernia without obstruction and without gangrene 5/11/2016     Varicose veins      Varicose veins of both lower extremities 5/12/2017        Allergies   Allergen Reactions     Aspartame Nausea     Venlafaxine Other (See Comments)     Night terrors     Zoloft [Sertraline] Other (See Comments)     Head rush, hives and agitation     Current Outpatient Medications   Medication     Acetaminophen (TYLENOL PO)     Ascorbic Acid (VITAMIN C) 500 MG CHEW     Calcium-Magnesium-Zinc 333-133-5 MG TABS per tablet     cetirizine (ZYRTEC) 10 MG tablet     DiphenhydrAMINE HCl (BENADRYL PO)     EPINEPHrine (ANY BX GENERIC EQUIV) 0.3 MG/0.3ML injection 2-pack     EPINEPHrine (ANY BX GENERIC EQUIV) 0.3 MG/0.3ML injection 2-pack     famotidine (PEPCID) 20 MG tablet     fexofenadine (ALLEGRA) 180 MG tablet     hydrOXYzine (ATARAX) 25 MG tablet     metroNIDAZOLE (METROGEL) 0.75 % external gel     montelukast (SINGULAIR) 10 MG tablet     Multiple Vitamins-Minerals (MULTIVITAMIN OR)     ORDER FOR ALLERGEN IMMUNOTHERAPY     triamcinolone (KENALOG) 0.1 % external cream     vitamin B complex with vitamin C (STRESS TAB) tablet     VITAMIN D, CHOLECALCIFEROL, PO     vitamin E 400 units TABS     No current facility-administered medications for this visit.     ROS- SEE HPI  /82 (BP Location: Left arm, Patient Position: Sitting, Cuff Size: Adult Large)   Pulse 82   Temp 98.2  F (36.8  C) (Tympanic)   Resp 22   Ht 1.88 m (6' 2\")   Wt 113.4 kg (250 lb)   SpO2 96%   BMI 32.10 kg/m      General - The patient is well nourished and well developed, and appears to have good nutritional status.  Alert and oriented to person and place, answers questions and cooperates with examination appropriately.   Head and Face - Normocephalic and atraumatic, with no gross asymmetry noted.  The facial nerve is intact, with strong symmetric " movements.  Voice and Breathing - The patient was breathing comfortably without the use of accessory muscles. There was no wheezing, stridor, or stertor.  The patients voice was clear and strong, and had appropriate pitch and quality.  Ears -The external auditory canals are patent, the tympanic membranes are intact without effusion, retraction or mass.  Bony landmarks are intact.  Eyes - Extraocular movements intact, and the pupils were reactive to light.  Sclera were not icteric or injected, conjunctiva were pink and moist.  Mouth - Examination of the oral cavity showed pink, healthy oral mucosa. No lesions or ulcerations noted.  The tongue was mobile and midline, and the dentition were in good condition.    Throat - The walls of the oropharynx were smooth, pink, moist, symmetric, and had no lesions or ulcerations.  The tonsillar pillars and soft palate were symmetric.  The uvula was midline on elevation.  FTP IIb  Neck - Normal midline excursion of the laryngotracheal complex during swallowing.  Full range of motion on passive movement.  Palpation of the occipital, submental, submandibular, internal jugular chain, and supraclavicular nodes did not demonstrate any abnormal lymph nodes or masses.  Palpation of the thyroid was soft and smooth, with no nodules or goiter appreciated.  The trachea was mobile and midline.  Nose - External contour is symmetric, no gross deflection or scars.  Nasal mucosa is pink and moist with no abnormal mucus.  No polyps, masses, or purulence noted on examination.  Nasal turbinate hypertrophy. Mild nasal valve collapse.         ASSESSMENT/ PLAN:    ICD-10-CM    1. Urticaria  L50.9       2. Perennial allergic rhinitis with seasonal variation  J30.89 montelukast (SINGULAIR) 10 MG tablet    J30.2 cetirizine (ZYRTEC) 10 MG tablet     mometasone (NASONEX) 50 MCG/ACT nasal spray      3. Chronic urticaria  L50.8 montelukast (SINGULAIR) 10 MG tablet     cetirizine (ZYRTEC) 10 MG tablet      4.  Nasal turbinate hypertrophy  J34.3 mometasone (NASONEX) 50 MCG/ACT nasal spray            Doing well at this time  Continue with allergy shots, maintain build up per protocol.   Continue with Zyrtec 1-2 times daily and Singulair.   Refilled medications for 1 year  Start a trial of Nasonex 2 sprays to each nostril daily. If worsening nasal congestion, may consider further options.     Epipen current  Annual follow up for SCIT.       Jeanna Celeste PA-C  ENT  [facility], [place]      Again, thank you for allowing me to participate in the care of your patient.        Sincerely,        Jeanna Celeste PA-C

## 2023-09-06 ENCOUNTER — ALLIED HEALTH/NURSE VISIT (OUTPATIENT)
Dept: ALLERGY | Facility: OTHER | Age: 58
End: 2023-09-06
Attending: PHYSICIAN ASSISTANT
Payer: COMMERCIAL

## 2023-09-06 DIAGNOSIS — J30.9 ALLERGIC RHINITIS: Primary | ICD-10-CM

## 2023-09-06 PROCEDURE — 95117 IMMUNOTHERAPY INJECTIONS: CPT

## 2023-09-06 NOTE — PROGRESS NOTES
Allergy injection/s given and charted on paper allergy flow sheet.  Patient experienced unknown reaction. Patient signed out AMA form at 1620.    Due to injection administration, patient instructed to remain in clinic for 30 minutes  afterwards, and to report any adverse reaction to me immediately. Patient signed out AMA form at 1620.    Clinic Administered Medication Documentation      Injectable Medication Documentation    Is there an active order (written within the past 365 days, with administrations remaining, not ) in the chart? Yes.     Patient was given  allergy injection of 0.35 ml from blue vial 1 given in the right arm and allergy injection of 0.35 ml from blue vial 2 given in the left arm . Prior to medication administration, verified patient's identity using patient s name and date of birth. Please see MAR and medication order for additional information. Patient instructed to remain in clinic for 15 minutes, report any adverse reaction to staff immediately, remain in clinic for 15 minutes and report any adverse reaction to staff immediately but patient declined, and patient signed out AMA form at 1620, instructed to seek medical help/attention if having a reaction or needing to use his Epipen, aware and acknowledged .    Vial/Syringe: Single dose vial. Was entire vial of medication used? Yes  Was this medication supplied by the patient? No  Is this a medication the patient will need to receive again? Yes. Verified that the patient has refills remaining in their prescription.

## 2023-09-07 ENCOUNTER — TELEPHONE (OUTPATIENT)
Dept: OTOLARYNGOLOGY | Facility: OTHER | Age: 58
End: 2023-09-07

## 2023-09-07 NOTE — TELEPHONE ENCOUNTER
Pharmacy tech called and reported patient's Nasonex is not covered. They are requesting the script be changed to Fluticasone 16g/50mcg. Per ALISTAIR Harper script changed per recommendation.

## 2023-09-08 ENCOUNTER — ALLIED HEALTH/NURSE VISIT (OUTPATIENT)
Dept: ALLERGY | Facility: OTHER | Age: 58
End: 2023-09-08
Attending: PHYSICIAN ASSISTANT
Payer: COMMERCIAL

## 2023-09-08 DIAGNOSIS — J30.2 PERENNIAL ALLERGIC RHINITIS WITH SEASONAL VARIATION: Primary | ICD-10-CM

## 2023-09-08 DIAGNOSIS — J30.89 PERENNIAL ALLERGIC RHINITIS WITH SEASONAL VARIATION: Primary | ICD-10-CM

## 2023-09-08 PROCEDURE — 95165 ANTIGEN THERAPY SERVICES: CPT | Performed by: PHYSICIAN ASSISTANT

## 2023-09-08 NOTE — PROGRESS NOTES
Allergy serum is mixed 9/7/23 by Yamini SCOTT NP and Jeanna COLMENARES at schedule Green 4 of 4,  into  2  (5 ml)  multi dose vial/vials.    Allergens included were:    Ragweed  0.2 ml of dilution # 0  Pigweed  0.2 ml of dilution # 1  Mugwort 0.2 ml of dilution # 0  Kochia  0.2 ml of dilution # 0  Russian Thistle 0.2 ml of dilution # 1  Saurav Grass 0.2 ml of dilution # 1  Birch mix 0.2 ml of dilution # 1  Maple Mix 0.2 of dilution # 1  Elm Mix 0.2 ml of dilution # 1  Oak Mix 0.2 ml of dilution # 1  Osbaldo Mix 0.2 ml of dilution # 1  Pine Mix 0.2 ml of dilution # 1  Eastern [place] 0.2 ml of dilution # 0  Black [place] 0.2 ml of dilution # 1  Aspen 0.2 ml of dilution # 0  Red [place] 0.2 ml of dilution # 0    Alternaria 0.2 ml of dilution # 1  Aspergillus 0.2 ml of dilution # 1  Hormodendrum 0.2 ml of dilution # 1  Helminthosporium 0.2 ml of dilution # 0  Penicillium 0.2 ml of dilution # 1  Epicoccum 0.2 ml of dilution # 1  Fusarium 0.2 ml of dilution # 1  Mucor 0.2 ml of dilution # 0  Grain Smut 0.2 ml of dilution # 0  Grass Smut 0.2 ml of dilution # 0  Cat 0.2 ml of dilution # 1  Dog 0.2 ml of dilution # 1  Feather Mix 0.2 ml of dilution # 0  Dust Mite Mix 0.2 ml of dilution # 1  Horse 0.2 ml of dilution # 0

## 2023-09-13 ENCOUNTER — ALLIED HEALTH/NURSE VISIT (OUTPATIENT)
Dept: ALLERGY | Facility: OTHER | Age: 58
End: 2023-09-13
Attending: PHYSICIAN ASSISTANT
Payer: COMMERCIAL

## 2023-09-13 DIAGNOSIS — J30.89 PERENNIAL ALLERGIC RHINITIS WITH SEASONAL VARIATION: Primary | ICD-10-CM

## 2023-09-13 DIAGNOSIS — J30.9 ALLERGIC RHINITIS: ICD-10-CM

## 2023-09-13 DIAGNOSIS — J30.2 PERENNIAL ALLERGIC RHINITIS WITH SEASONAL VARIATION: Primary | ICD-10-CM

## 2023-09-13 PROCEDURE — 95117 IMMUNOTHERAPY INJECTIONS: CPT

## 2023-09-13 NOTE — PROGRESS NOTES
Allergy injection/s given and charted on paper allergy flow sheet.  Patient experienced unknown reaction.  Patient advised to stay 30 minutes but signed out AMA.  Instructed to call 911 for any reaction or if Epi pen is used.  Patient was given 0.4 mls from his Blues vial both to the right and left upper/back arm.  Due to injection administration, patient instructed to remain in clinic for 30 minutes  afterwards, and to report any adverse reaction to me immediately. Patient wished to sign out AMA.

## 2023-09-19 ENCOUNTER — TELEPHONE (OUTPATIENT)
Dept: ALLERGY | Facility: OTHER | Age: 58
End: 2023-09-19

## 2023-09-19 NOTE — TELEPHONE ENCOUNTER
1010: writer called the patient to confirm that the patient wants to change from allergy injections to allergy drops, as noted. Patient verified name and date of birth, and states that he is interested in starting allergy drops at this time, as family circumstances needed. Patient aware of rough out of pocket costs and verbal ok to proceed with allergy drop process. Patient inquired about going back to shots at a later date, informed it is possible and would discuss with ENT providers at that time. No further questions or concerns at call completion.     Writer placed forms and paperwork with provider to complete, will have patient sign needed forms at next allergy injection appointment, and start process of obtaining allergy drops and will contact patient to set up an initial allergy drop appointment once arrived, and patient continuing allergy shots while waiting for allergy drops.

## 2023-09-20 ENCOUNTER — ALLIED HEALTH/NURSE VISIT (OUTPATIENT)
Dept: ALLERGY | Facility: OTHER | Age: 58
End: 2023-09-20
Attending: PHYSICIAN ASSISTANT
Payer: COMMERCIAL

## 2023-09-20 DIAGNOSIS — J30.9 ALLERGIC RHINITIS: Primary | ICD-10-CM

## 2023-09-20 PROCEDURE — 95117 IMMUNOTHERAPY INJECTIONS: CPT

## 2023-09-20 NOTE — PROGRESS NOTES
Allergy injection/s given and charted on paper allergy flow sheet.  Patient experienced unknown reaction.  Due to injection administration, patient instructed to remain in clinic for 15 minutes  afterwards, and to report any adverse reaction to me immediately.  Pt signed out AMA [id number].  EVA TARIQ LPN

## 2023-09-27 ENCOUNTER — ALLIED HEALTH/NURSE VISIT (OUTPATIENT)
Dept: ALLERGY | Facility: OTHER | Age: 58
End: 2023-09-27
Attending: PHYSICIAN ASSISTANT
Payer: COMMERCIAL

## 2023-09-27 DIAGNOSIS — J30.9 ALLERGIC RHINITIS: Primary | ICD-10-CM

## 2023-09-27 PROCEDURE — 95117 IMMUNOTHERAPY INJECTIONS: CPT

## 2023-09-27 NOTE — PROGRESS NOTES
Allergy injection/s given and charted on paper allergy flow sheet.  Patient experienced unknown reaction.  Due to injection administration, patient instructed to remain in clinic for 15 minutes  afterwards, and to report any adverse reaction to me immediately.  Pt signed out AMA [id number].  EVA TARIQ LPN

## 2023-09-29 ENCOUNTER — TELEPHONE (OUTPATIENT)
Dept: ALLERGY | Facility: OTHER | Age: 58
End: 2023-09-29

## 2023-09-29 DIAGNOSIS — J31.0 PERENNIAL NON-ALLERGIC RHINITIS: Primary | ICD-10-CM

## 2023-09-29 NOTE — TELEPHONE ENCOUNTER
Patient's allergy drops arrived and patient needs current order for SLIT. I prepped order for signature if appropriate. Thank you.

## 2023-10-03 ENCOUNTER — TELEPHONE (OUTPATIENT)
Dept: ALLERGY | Facility: OTHER | Age: 58
End: 2023-10-03

## 2023-10-03 NOTE — TELEPHONE ENCOUNTER
1320: Called and spoke to the patient regarding his allergy drops have arrived in the clinic and needing to set up an initial allergy drop appointment. Patient aware and scheduled for October 6th, 2023 at 1400 for new allergy drop appointment. Patient instructed to bring his Epipen to appointment and to not eat or drink 30 minutes prior to appointment, patient aware and acknowledged. Patient ok with canceling upcoming allergy shot/SVT appointment on October 4th, 2023, as he will be starting drops. No further questions or concerns at call completion.

## 2023-10-06 ENCOUNTER — OFFICE VISIT (OUTPATIENT)
Dept: ALLERGY | Facility: OTHER | Age: 58
End: 2023-10-06
Attending: PHYSICIAN ASSISTANT
Payer: COMMERCIAL

## 2023-10-06 DIAGNOSIS — J31.0 PERENNIAL NON-ALLERGIC RHINITIS: Primary | ICD-10-CM

## 2023-10-06 PROCEDURE — 99207 PR NO CHARGE NURSE ONLY: CPT

## 2023-10-06 NOTE — PROGRESS NOTES
Pt identified using name and date of birth.           Patient is here for education and  SLIT drops and first administration.  All instructions for SLIT use are reviewed with the patient.  Signs and symptoms of anaphylaxis both local and systemic are discussed.  Patient verbalized understanding.  Patient is taught how to use EPI pen and a return demonstration is given.  First administration of SLIT is done by patient without incident.  Patient has EPI pen with today.  Patient is aware that a follow up is needed in 6 months and may call us for refills of SLIT when it starts to run low. Patient instructed to seek medical attention/help if having a reaction or needing to use their Epipen. Patient aware and acknowledged. No further questions or concerns, patient provided contact information for any questions/concerns, and aware of needing a follow up in 3-6 months (3/1/2024 ENT follow up appointment previously made).

## 2023-11-21 ENCOUNTER — OFFICE VISIT (OUTPATIENT)
Dept: FAMILY MEDICINE | Facility: OTHER | Age: 58
End: 2023-11-21
Attending: STUDENT IN AN ORGANIZED HEALTH CARE EDUCATION/TRAINING PROGRAM
Payer: COMMERCIAL

## 2023-11-21 ENCOUNTER — ANCILLARY PROCEDURE (OUTPATIENT)
Dept: GENERAL RADIOLOGY | Facility: OTHER | Age: 58
End: 2023-11-21
Attending: STUDENT IN AN ORGANIZED HEALTH CARE EDUCATION/TRAINING PROGRAM
Payer: COMMERCIAL

## 2023-11-21 VITALS
DIASTOLIC BLOOD PRESSURE: 96 MMHG | SYSTOLIC BLOOD PRESSURE: 147 MMHG | TEMPERATURE: 98.7 F | OXYGEN SATURATION: 95 % | BODY MASS INDEX: 35.73 KG/M2 | HEART RATE: 97 BPM | WEIGHT: 278.3 LBS

## 2023-11-21 DIAGNOSIS — J22 LOWER RESPIRATORY INFECTION (E.G., BRONCHITIS, PNEUMONIA, PNEUMONITIS, PULMONITIS): ICD-10-CM

## 2023-11-21 DIAGNOSIS — J22 LOWER RESPIRATORY INFECTION (E.G., BRONCHITIS, PNEUMONIA, PNEUMONITIS, PULMONITIS): Primary | ICD-10-CM

## 2023-11-21 LAB
ANION GAP SERPL CALCULATED.3IONS-SCNC: 10 MMOL/L (ref 7–15)
BASOPHILS # BLD AUTO: 0 10E3/UL (ref 0–0.2)
BASOPHILS NFR BLD AUTO: 0 %
BUN SERPL-MCNC: 15 MG/DL (ref 6–20)
CALCIUM SERPL-MCNC: 9.5 MG/DL (ref 8.6–10)
CHLORIDE SERPL-SCNC: 99 MMOL/L (ref 98–107)
CREAT SERPL-MCNC: 0.98 MG/DL (ref 0.67–1.17)
CRP SERPL-MCNC: 17.71 MG/L
DEPRECATED HCO3 PLAS-SCNC: 27 MMOL/L (ref 22–29)
EGFRCR SERPLBLD CKD-EPI 2021: >90 ML/MIN/1.73M2
EOSINOPHIL # BLD AUTO: 0.3 10E3/UL (ref 0–0.7)
EOSINOPHIL NFR BLD AUTO: 4 %
ERYTHROCYTE [DISTWIDTH] IN BLOOD BY AUTOMATED COUNT: 13.1 % (ref 10–15)
FLUAV RNA SPEC QL NAA+PROBE: NEGATIVE
FLUBV RNA RESP QL NAA+PROBE: NEGATIVE
GLUCOSE SERPL-MCNC: 107 MG/DL (ref 70–99)
HCT VFR BLD AUTO: 43.1 % (ref 40–53)
HGB BLD-MCNC: 15.3 G/DL (ref 13.3–17.7)
IMM GRANULOCYTES # BLD: 0.1 10E3/UL
IMM GRANULOCYTES NFR BLD: 1 %
LYMPHOCYTES # BLD AUTO: 1.5 10E3/UL (ref 0.8–5.3)
LYMPHOCYTES NFR BLD AUTO: 20 %
MCH RBC QN AUTO: 30.4 PG (ref 26.5–33)
MCHC RBC AUTO-ENTMCNC: 35.5 G/DL (ref 31.5–36.5)
MCV RBC AUTO: 86 FL (ref 78–100)
MONOCYTES # BLD AUTO: 0.9 10E3/UL (ref 0–1.3)
MONOCYTES NFR BLD AUTO: 12 %
NEUTROPHILS # BLD AUTO: 4.7 10E3/UL (ref 1.6–8.3)
NEUTROPHILS NFR BLD AUTO: 63 %
NRBC # BLD AUTO: 0 10E3/UL
NRBC BLD AUTO-RTO: 0 /100
PLATELET # BLD AUTO: 218 10E3/UL (ref 150–450)
POTASSIUM SERPL-SCNC: 4.1 MMOL/L (ref 3.4–5.3)
PROCALCITONIN SERPL IA-MCNC: 0.21 NG/ML
RBC # BLD AUTO: 5.04 10E6/UL (ref 4.4–5.9)
RSV RNA SPEC NAA+PROBE: NEGATIVE
SARS-COV-2 RNA RESP QL NAA+PROBE: NEGATIVE
SODIUM SERPL-SCNC: 136 MMOL/L (ref 135–145)
WBC # BLD AUTO: 7.4 10E3/UL (ref 4–11)

## 2023-11-21 PROCEDURE — 87637 SARSCOV2&INF A&B&RSV AMP PRB: CPT | Performed by: STUDENT IN AN ORGANIZED HEALTH CARE EDUCATION/TRAINING PROGRAM

## 2023-11-21 PROCEDURE — 80048 BASIC METABOLIC PNL TOTAL CA: CPT | Performed by: STUDENT IN AN ORGANIZED HEALTH CARE EDUCATION/TRAINING PROGRAM

## 2023-11-21 PROCEDURE — 85025 COMPLETE CBC W/AUTO DIFF WBC: CPT | Performed by: STUDENT IN AN ORGANIZED HEALTH CARE EDUCATION/TRAINING PROGRAM

## 2023-11-21 PROCEDURE — 71046 X-RAY EXAM CHEST 2 VIEWS: CPT | Mod: TC | Performed by: STUDENT IN AN ORGANIZED HEALTH CARE EDUCATION/TRAINING PROGRAM

## 2023-11-21 PROCEDURE — 84145 PROCALCITONIN (PCT): CPT | Performed by: STUDENT IN AN ORGANIZED HEALTH CARE EDUCATION/TRAINING PROGRAM

## 2023-11-21 PROCEDURE — 36415 COLL VENOUS BLD VENIPUNCTURE: CPT | Performed by: STUDENT IN AN ORGANIZED HEALTH CARE EDUCATION/TRAINING PROGRAM

## 2023-11-21 PROCEDURE — 86140 C-REACTIVE PROTEIN: CPT | Performed by: STUDENT IN AN ORGANIZED HEALTH CARE EDUCATION/TRAINING PROGRAM

## 2023-11-21 PROCEDURE — 99215 OFFICE O/P EST HI 40 MIN: CPT | Performed by: STUDENT IN AN ORGANIZED HEALTH CARE EDUCATION/TRAINING PROGRAM

## 2023-11-21 RX ORDER — ALBUTEROL SULFATE 90 UG/1
2 AEROSOL, METERED RESPIRATORY (INHALATION) EVERY 6 HOURS PRN
Qty: 18 G | Refills: 0 | Status: SHIPPED | OUTPATIENT
Start: 2023-11-21 | End: 2024-04-02

## 2023-11-21 ASSESSMENT — ANXIETY QUESTIONNAIRES
3. WORRYING TOO MUCH ABOUT DIFFERENT THINGS: NOT AT ALL
1. FEELING NERVOUS, ANXIOUS, OR ON EDGE: SEVERAL DAYS
2. NOT BEING ABLE TO STOP OR CONTROL WORRYING: NOT AT ALL
GAD7 TOTAL SCORE: 3
5. BEING SO RESTLESS THAT IT IS HARD TO SIT STILL: NOT AT ALL
6. BECOMING EASILY ANNOYED OR IRRITABLE: NOT AT ALL
IF YOU CHECKED OFF ANY PROBLEMS ON THIS QUESTIONNAIRE, HOW DIFFICULT HAVE THESE PROBLEMS MADE IT FOR YOU TO DO YOUR WORK, TAKE CARE OF THINGS AT HOME, OR GET ALONG WITH OTHER PEOPLE: NOT DIFFICULT AT ALL
4. TROUBLE RELAXING: SEVERAL DAYS
GAD7 TOTAL SCORE: 3
7. FEELING AFRAID AS IF SOMETHING AWFUL MIGHT HAPPEN: SEVERAL DAYS

## 2023-11-21 ASSESSMENT — PAIN SCALES - GENERAL: PAINLEVEL: MILD PAIN (3)

## 2023-11-21 ASSESSMENT — PATIENT HEALTH QUESTIONNAIRE - PHQ9: SUM OF ALL RESPONSES TO PHQ QUESTIONS 1-9: 0

## 2023-11-21 NOTE — PROGRESS NOTES
"  Assessment & Plan     Lower respiratory infection (e.g., bronchitis, pneumonia, pneumonitis, pulmonitis)  About 10 days lower respiratory symptoms, fever, known exposure to COVID.  No respiratory distress but lungs quite inflamed on exam.  CXR to assess for focal pneumonia or effusion was negative on preliminary read.  Lab screening and viral multiplex pending.  We will try to avoid steroid burst particularly with ongoing allergy shots, try albuterol for the wheezing, and reviewed standard supportive care recommendations.  - CBC with platelets and differential; Future  - Basic metabolic panel; Future  - CRP, inflammation; Future  - Procalcitonin; Future  - XR Chest 2 Views; Future  - Symptomatic Influenza A/B, RSV, & SARS-CoV2 PCR (COVID-19) Nose    I spent a total of 42 minutes on the day of the visit.   Time spent by me doing chart review, history and exam, documentation and further activities per the note       BMI:   Estimated body mass index is 35.73 kg/m  as calculated from the following:    Height as of 9/1/23: 1.88 m (6' 2\").    Weight as of this encounter: 126.2 kg (278 lb 4.8 oz).   Weight management plan: Did not discuss today.    Follow-up if not improving.    Valdemar Bray MD  [facility] Drew is a 58 year old, presenting for the following health issues:  Cough      11/21/2023     3:48 PM   Additional Questions   Roomed by Porfirio Gonzalez   Accompanied by None         11/21/2023     3:48 PM   Patient Reported Additional Medications   Patient reports taking the following new medications None       HPI     Acute Illness  Acute illness concerns: Cough, wheezing, fever, headache and sore throat   Onset/Duration: 11/13/2023  Symptoms:  Fever: YES- 103.0   Chills/Sweats: YES  Headache (location?): YES  Sinus Pressure: No  Conjunctivitis:  No  Ear Pain: no  Rhinorrhea: No  Congestion: YES  Sore Throat: YES  Cough: YES-productive of yellow sputum, waxing and waning over " time  Wheeze: YES  Decreased Appetite: YES  Nausea: No  Vomiting: No  Diarrhea: No  Dysuria/Freq.: No  Dysuria or Hematuria: No  Fatigue/Achiness: YES  Sick/Strep Exposure: No  Therapies tried and outcome: Mucinex liquid, nigh quil    -Started a little tickle, evolved into lower respiratory symptoms and fever  -Breathing seeming heavier last several days  -Very junky cough  -Daughter had confirmed COVID-positive yesterday    Review of Systems   Constitutional, HEENT, cardiovascular, pulmonary, gi and gu systems are negative, except as otherwise noted.      Objective    BP (!) 147/96 (BP Location: Right arm, Patient Position: Sitting, Cuff Size: Adult Large)   Pulse 97   Temp 98.7  F (37.1  C) (Tympanic)   Wt 126.2 kg (278 lb 4.8 oz)   SpO2 95%   BMI 35.73 kg/m    Body mass index is 35.73 kg/m .  Physical Exam  Vitals reviewed.   Constitutional:       Appearance: Normal appearance.   HENT:      Head: Normocephalic and atraumatic.      Right Ear: Tympanic membrane, ear canal and external ear normal.      Left Ear: Tympanic membrane, ear canal and external ear normal.      Nose: Congestion present.      Mouth/Throat:      Mouth: Mucous membranes are moist.      Pharynx: Posterior oropharyngeal erythema present. No oropharyngeal exudate.   Eyes:      Pupils: Pupils are equal, round, and reactive to light.   Cardiovascular:      Rate and Rhythm: Normal rate and regular rhythm.      Heart sounds: No murmur heard.  Pulmonary:      Effort: Pulmonary effort is normal.      Breath sounds: No stridor. Wheezing present. No rhonchi or rales.      Comments: Mild scattered expiratory wheeze.  Some readily upper airway accessory sounds.  Slight rhonchorous sounds left upper and lower.  Normal respiratory effort.  Musculoskeletal:         General: Normal range of motion.      Cervical back: Normal range of motion.      Right lower leg: No edema.      Left lower leg: No edema.   Lymphadenopathy:      Cervical: No cervical  adenopathy.   Skin:     General: Skin is warm and dry.   Neurological:      General: No focal deficit present.      Mental Status: He is alert and oriented to person, place, and time.   Psychiatric:         Mood and Affect: Mood normal.         Behavior: Behavior normal.        CXR - Reviewed and interpreted by me: Stable from previous study.  No focal consolidation, effusions.

## 2023-11-21 NOTE — LETTER
November 21, 2023      Rajendra Gagnon  [address]  [place] [address]        To Whom It May Concern:    Rajendra Gagnon was seen on 11/21/23.  Please excuse him 11/21 and 11/22 due to illness.        Sincerely,        Valdemar Bray MD

## 2023-11-21 NOTE — COMMUNITY RESOURCES LIST (ENGLISH)
11/21/2023   [facility] [employer]  N/A  For questions about this resource list or additional care needs, please contact your primary care clinic or care manager.  Phone: [phone]   Email: N/A   Address: [address] [place] [id number]   Hours: N/A        Financial Stability       Utility payment assistance  1  [facility] and Service [place] Distance: 5.08 miles      In-Person   [address] [place] [address]  Language: English  Hours: Mon - Fri 9:00 AM - 12:00 PM , Mon - Fri 1:00 PM - 5:00 PM  Fees: Free, Self Pay   Phone: (137) 668-3043 Email: [email or web address] Website: [email or web address]          Important Numbers & Websites       Emergency Services   911  [facility] Services   311  Poison Control   (485) 500-2685  Suicide Prevention Lifeline   (449) 510-4314 (TALK)  Child Abuse Hotline   (979) 864-4766 (4-A-Child)  Sexual Assault Hotline   (844) 154-1340 (HOPE)  National Runaway Safeline   (207) 184-1643 (RUNAWAY)  All-Options Talkline   (452) 856-9338  Substance Abuse Referral   (773) 184-1245 (HELP)

## 2023-11-21 NOTE — COMMUNITY RESOURCES LIST (ENGLISH)
11/21/2023   [facility] [employer]  N/A  For questions about this resource list or additional care needs, please contact your primary care clinic or care manager.  Phone: [phone]   Email: N/A   Address: [address] [place] [address]   Hours: N/A        Financial Stability       Utility payment assistance  1  [facility] and [facility] [place] Distance: 5.08 miles      In-Person   [address] [place] [address]  Language: English  Hours: Mon - Fri 9:00 AM - 12:00 PM , Mon - Fri 1:00 PM - 5:00 PM  Fees: Free, Self Pay   Phone: (669) 434-9005 Email: [email or web address] Website: [email or web address]          Important Numbers & Websites       Emergency Services   911  [facility] Services   311  Poison Control   (677) 416-9526  Suicide Prevention Lifeline   (461) 286-4091 (TALK)  Child Abuse Hotline   (803) 236-4465 (4-A-Child)  Sexual Assault Hotline   (567) 525-7596 (HOPE)  National Runaway Safeline   (171) 727-4630 (RUNAWAY)  All-Options Talkline   (526) 785-5448  Substance Abuse Referral   (647) 187-2349 (HELP)

## 2023-11-21 NOTE — COMMUNITY RESOURCES LIST (ENGLISH)
11/21/2023   [facility] [employer]  N/A  For questions about this resource list or additional care needs, please contact your primary care clinic or care manager.  Phone: [phone]   Email: N/A   Address: [address] [place] [address]   Hours: N/A        Financial Stability       Utility payment assistance  1  [facility] and Service [place] Distance: 5.08 miles      In-Person   [address] [place] [address]  Language: English  Hours: Mon - Fri 9:00 AM - 12:00 PM , Mon - Fri 1:00 PM - 5:00 PM  Fees: Free, Self Pay   Phone: (531) 494-7178 Email: [email or web address] Website: [email or web address]          Important Numbers & Websites       Emergency Services   911  [facility] Services   311  Poison Control   (901) 638-5262  Suicide Prevention Lifeline   (158) 513-4612 (TALK)  Child Abuse Hotline   (415) 346-3325 (4-A-Child)  Sexual Assault Hotline   (981) 197-1663 (HOPE)  National Runaway Safeline   (213) 590-6747 (RUNAWAY)  All-Options Talkline   (663) 847-1703  Substance Abuse Referral   (476) 537-4717 (HELP)

## 2023-12-09 ENCOUNTER — HEALTH MAINTENANCE LETTER (OUTPATIENT)
Age: 58
End: 2023-12-09

## 2023-12-11 NOTE — LETTER
My Depression Action Plan  Name: Rajendra Gagnon   Date of Birth 1965  Date: 11/14/2017    My doctor: Bhupinder Mckeon   My clinic: [facility] [place]  [address] [place] [address]  [phone]          GREEN    ZONE   Good Control    What it looks like:     Things are going generally well. You have normal up s and down s. You may even feel depressed from time to time, but bad moods usually last less than a day.   What you need to do:  1. Continue to care for yourself (see self care plan)  2. Check your depression survival kit and update it as needed  3. Follow your physician s recommendations including any medication.  4. Do not stop taking medication unless you consult with your physician first.           YELLOW         ZONE Getting Worse    What it looks like:     Depression is starting to interfere with your life.     It may be hard to get out of bed; you may be starting to isolate yourself from others.    Symptoms of depression are starting to last most all day and this has happened for several days.     You may have suicidal thoughts but they are not constant.   What you need to do:     1. Call your care team, your response to treatment will improve if you keep your care team informed of your progress. Yellow periods are signs an adjustment may need to be made.     2. Continue your self-care, even if you have to fake it!    3. Talk to someone in your support network    4. Open up your depression survival kit           RED    ZONE Medical Alert - Get Help    What it looks like:     Depression is seriously interfering with your life.     You may experience these or other symptoms: You can t get out of bed most days, can t work or engage in other necessary activities, you have trouble taking care of basic hygiene, or basic responsibilities, thoughts of suicide or death that will not go away, self-injurious behavior.     What you need to do:  1. Call your care team and request a  same-day appointment. If they are not available (weekends or after hours) call your local crisis line, emergency room or 911.      Electronically signed by: Malinda Reddy, November 14, 2017    Depression Self Care Plan / Survival Kit    Self-Care for Depression  Here s the deal. Your body and mind are really not as separate as most people think.  What you do and think affects how you feel and how you feel influences what you do and think. This means if you do things that people who feel good do, it will help you feel better.  Sometimes this is all it takes.  There is also a place for medication and therapy depending on how severe your depression is, so be sure to consult with your medical provider and/ or Behavioral Health Consultant if your symptoms are worsening or not improving.     In order to better manage my stress, I will:    Exercise  Get some form of exercise, every day. This will help reduce pain and release endorphins, the  feel good  chemicals in your brain. This is almost as good as taking antidepressants!  This is not the same as joining a gym and then never going! (they count on that by the way ) It can be as simple as just going for a walk or doing some gardening, anything that will get you moving.      Hygiene   Maintain good hygiene (Get out of bed in the morning, Make your bed, Brush your teeth, Take a shower, and Get dressed like you were going to work, even if you are unemployed).  If your clothes don't fit try to get ones that do.    Diet  I will strive to eat foods that are good for me, drink plenty of water, and avoid excessive sugar, caffeine, alcohol, and other mood-altering substances.  Some foods that are helpful in depression are: complex carbohydrates, B vitamins, flaxseed, fish or fish oil, fresh fruits and vegetables.    Psychotherapy  I agree to participate in Individual Therapy (if recommended).    Medication  If prescribed medications, I agree to take them.  Missing doses can  result in serious side effects.  I understand that drinking alcohol, or other illicit drug use, may cause potential side effects.  I will not stop my medication abruptly without first discussing it with my provider.    Staying Connected With Others  I will stay in touch with my friends, family members, and my primary care provider/team.    Use your imagination  Be creative.  We all have a creative side; it doesn t matter if it s oil painting, sand castles, or mud pies! This will also kick up the endorphins.    Witness Beauty  (AKA stop and smell the roses) Take a look outside, even in mid-winter. Notice colors, textures. Watch the squirrels and birds.     Service to others  Be of service to others.  There is always someone else in need.  By helping others we can  get out of ourselves  and remember the really important things.  This also provides opportunities for practicing all the other parts of the program.    Humor  Laugh and be silly!  Adjust your TV habits for less news and crime-drama and more comedy.    Control your stress  Try breathing deep, massage therapy, biofeedback, and meditation. Find time to relax each day.     My support system    Clinic Contact:  Phone number:    Contact 1:  Phone number:    Contact 2:  Phone number:    Baptism/:  Phone number:    Therapist:  Phone number:    Local crisis center:    Phone number:    Other community support:  Phone number:       Yes

## 2024-01-26 ENCOUNTER — TELEPHONE (OUTPATIENT)
Dept: ALLERGY | Facility: OTHER | Age: 59
End: 2024-01-26

## 2024-01-26 NOTE — TELEPHONE ENCOUNTER
Called Pt (1/25/24) to let him know his allergy drop refill was requested and they should [employer] would be reaching out to him for payment information. Pt stated his Epipen was good and he was doing well on the drops. Pt stated no further questions or concerns at call completion.

## 2024-04-02 ENCOUNTER — LAB (OUTPATIENT)
Dept: LAB | Facility: OTHER | Age: 59
End: 2024-04-02
Attending: FAMILY MEDICINE
Payer: COMMERCIAL

## 2024-04-02 ENCOUNTER — OFFICE VISIT (OUTPATIENT)
Dept: FAMILY MEDICINE | Facility: OTHER | Age: 59
End: 2024-04-02
Attending: FAMILY MEDICINE
Payer: COMMERCIAL

## 2024-04-02 VITALS
SYSTOLIC BLOOD PRESSURE: 139 MMHG | OXYGEN SATURATION: 94 % | RESPIRATION RATE: 16 BRPM | HEIGHT: 74 IN | DIASTOLIC BLOOD PRESSURE: 83 MMHG | HEART RATE: 79 BPM | BODY MASS INDEX: 36 KG/M2 | TEMPERATURE: 98.2 F | WEIGHT: 280.5 LBS

## 2024-04-02 DIAGNOSIS — Z00.00 ROUTINE GENERAL MEDICAL EXAMINATION AT A HEALTH CARE FACILITY: Primary | ICD-10-CM

## 2024-04-02 DIAGNOSIS — D33.3 BENIGN NEOPLASM OF CRANIAL NERVE (H): ICD-10-CM

## 2024-04-02 DIAGNOSIS — E66.01 MORBID OBESITY (H): ICD-10-CM

## 2024-04-02 DIAGNOSIS — L50.8 CHRONIC URTICARIA: ICD-10-CM

## 2024-04-02 DIAGNOSIS — E78.1 HYPERTRIGLYCERIDEMIA: ICD-10-CM

## 2024-04-02 DIAGNOSIS — C61 PRIMARY MALIGNANT NEOPLASM OF PROSTATE (H): ICD-10-CM

## 2024-04-02 DIAGNOSIS — Z00.00 ROUTINE GENERAL MEDICAL EXAMINATION AT A HEALTH CARE FACILITY: ICD-10-CM

## 2024-04-02 LAB
ALBUMIN SERPL BCG-MCNC: 4.6 G/DL (ref 3.5–5.2)
ALP SERPL-CCNC: 78 U/L (ref 40–150)
ALT SERPL W P-5'-P-CCNC: 31 U/L (ref 0–70)
ANION GAP SERPL CALCULATED.3IONS-SCNC: 9 MMOL/L (ref 7–15)
AST SERPL W P-5'-P-CCNC: 33 U/L (ref 0–45)
BASOPHILS # BLD AUTO: 0 10E3/UL (ref 0–0.2)
BASOPHILS NFR BLD AUTO: 0 %
BILIRUB SERPL-MCNC: 0.9 MG/DL
BUN SERPL-MCNC: 13.9 MG/DL (ref 6–20)
CALCIUM SERPL-MCNC: 9.3 MG/DL (ref 8.6–10)
CHLORIDE SERPL-SCNC: 101 MMOL/L (ref 98–107)
CHOLEST SERPL-MCNC: 185 MG/DL
CREAT SERPL-MCNC: 1 MG/DL (ref 0.67–1.17)
DEPRECATED HCO3 PLAS-SCNC: 26 MMOL/L (ref 22–29)
EGFRCR SERPLBLD CKD-EPI 2021: 88 ML/MIN/1.73M2
EOSINOPHIL # BLD AUTO: 0.3 10E3/UL (ref 0–0.7)
EOSINOPHIL NFR BLD AUTO: 4 %
ERYTHROCYTE [DISTWIDTH] IN BLOOD BY AUTOMATED COUNT: 13 % (ref 10–15)
FASTING STATUS PATIENT QL REPORTED: YES
GLUCOSE SERPL-MCNC: 90 MG/DL (ref 70–99)
HCT VFR BLD AUTO: 43.2 % (ref 40–53)
HDLC SERPL-MCNC: 29 MG/DL
HGB BLD-MCNC: 15 G/DL (ref 13.3–17.7)
IMM GRANULOCYTES # BLD: 0 10E3/UL
IMM GRANULOCYTES NFR BLD: 0 %
LDLC SERPL CALC-MCNC: 117 MG/DL
LYMPHOCYTES # BLD AUTO: 2.1 10E3/UL (ref 0.8–5.3)
LYMPHOCYTES NFR BLD AUTO: 31 %
MCH RBC QN AUTO: 29.6 PG (ref 26.5–33)
MCHC RBC AUTO-ENTMCNC: 34.7 G/DL (ref 31.5–36.5)
MCV RBC AUTO: 85 FL (ref 78–100)
MONOCYTES # BLD AUTO: 0.5 10E3/UL (ref 0–1.3)
MONOCYTES NFR BLD AUTO: 7 %
NEUTROPHILS # BLD AUTO: 4 10E3/UL (ref 1.6–8.3)
NEUTROPHILS NFR BLD AUTO: 57 %
NONHDLC SERPL-MCNC: 156 MG/DL
NRBC # BLD AUTO: 0 10E3/UL
NRBC BLD AUTO-RTO: 0 /100
PLATELET # BLD AUTO: 208 10E3/UL (ref 150–450)
POTASSIUM SERPL-SCNC: 3.9 MMOL/L (ref 3.4–5.3)
PROT SERPL-MCNC: 7.6 G/DL (ref 6.4–8.3)
PSA SERPL DL<=0.01 NG/ML-MCNC: 0.02 NG/ML (ref 0–3.5)
RBC # BLD AUTO: 5.07 10E6/UL (ref 4.4–5.9)
SODIUM SERPL-SCNC: 136 MMOL/L (ref 135–145)
TRIGL SERPL-MCNC: 194 MG/DL
TSH SERPL DL<=0.005 MIU/L-ACNC: 2.38 UIU/ML (ref 0.3–4.2)
WBC # BLD AUTO: 6.9 10E3/UL (ref 4–11)

## 2024-04-02 PROCEDURE — 80050 GENERAL HEALTH PANEL: CPT

## 2024-04-02 PROCEDURE — 80061 LIPID PANEL: CPT

## 2024-04-02 PROCEDURE — 99396 PREV VISIT EST AGE 40-64: CPT | Performed by: FAMILY MEDICINE

## 2024-04-02 PROCEDURE — 99213 OFFICE O/P EST LOW 20 MIN: CPT | Mod: 25 | Performed by: FAMILY MEDICINE

## 2024-04-02 PROCEDURE — 36415 COLL VENOUS BLD VENIPUNCTURE: CPT

## 2024-04-02 PROCEDURE — 84153 ASSAY OF PSA TOTAL: CPT

## 2024-04-02 SDOH — HEALTH STABILITY: PHYSICAL HEALTH: ON AVERAGE, HOW MANY MINUTES DO YOU ENGAGE IN EXERCISE AT THIS LEVEL?: 30 MIN

## 2024-04-02 SDOH — HEALTH STABILITY: PHYSICAL HEALTH: ON AVERAGE, HOW MANY DAYS PER WEEK DO YOU ENGAGE IN MODERATE TO STRENUOUS EXERCISE (LIKE A BRISK WALK)?: 5 DAYS

## 2024-04-02 ASSESSMENT — ANXIETY QUESTIONNAIRES
2. NOT BEING ABLE TO STOP OR CONTROL WORRYING: SEVERAL DAYS
GAD7 TOTAL SCORE: 4
GAD7 TOTAL SCORE: 4
3. WORRYING TOO MUCH ABOUT DIFFERENT THINGS: NOT AT ALL
7. FEELING AFRAID AS IF SOMETHING AWFUL MIGHT HAPPEN: SEVERAL DAYS
5. BEING SO RESTLESS THAT IT IS HARD TO SIT STILL: NOT AT ALL
IF YOU CHECKED OFF ANY PROBLEMS ON THIS QUESTIONNAIRE, HOW DIFFICULT HAVE THESE PROBLEMS MADE IT FOR YOU TO DO YOUR WORK, TAKE CARE OF THINGS AT HOME, OR GET ALONG WITH OTHER PEOPLE: NOT DIFFICULT AT ALL
1. FEELING NERVOUS, ANXIOUS, OR ON EDGE: SEVERAL DAYS
4. TROUBLE RELAXING: NOT AT ALL
7. FEELING AFRAID AS IF SOMETHING AWFUL MIGHT HAPPEN: SEVERAL DAYS
6. BECOMING EASILY ANNOYED OR IRRITABLE: SEVERAL DAYS
GAD7 TOTAL SCORE: 4
8. IF YOU CHECKED OFF ANY PROBLEMS, HOW DIFFICULT HAVE THESE MADE IT FOR YOU TO DO YOUR WORK, TAKE CARE OF THINGS AT HOME, OR GET ALONG WITH OTHER PEOPLE?: NOT DIFFICULT AT ALL

## 2024-04-02 ASSESSMENT — PATIENT HEALTH QUESTIONNAIRE - PHQ9
SUM OF ALL RESPONSES TO PHQ QUESTIONS 1-9: 5
SUM OF ALL RESPONSES TO PHQ QUESTIONS 1-9: 5
10. IF YOU CHECKED OFF ANY PROBLEMS, HOW DIFFICULT HAVE THESE PROBLEMS MADE IT FOR YOU TO DO YOUR WORK, TAKE CARE OF THINGS AT HOME, OR GET ALONG WITH OTHER PEOPLE: NOT DIFFICULT AT ALL

## 2024-04-02 ASSESSMENT — PAIN SCALES - GENERAL: PAINLEVEL: NO PAIN (1)

## 2024-04-02 ASSESSMENT — SOCIAL DETERMINANTS OF HEALTH (SDOH): HOW OFTEN DO YOU GET TOGETHER WITH FRIENDS OR RELATIVES?: ONCE A WEEK

## 2024-04-02 NOTE — PROGRESS NOTES
Preventive Care Visit  [facility]  Bhupinder Mckeon MD, Family Medicine  Apr 2, 2024      Assessment & Plan     Routine general medical examination at a health care facility  Stable - no major issues   - Comprehensive metabolic panel; Future  - CBC with Platelets & Differential; Future  - Lipid Profile; Future  - TSH; Future    Primary malignant neoplasm of prostate (H)  Psa done - slight bump upwards. Will repeat psa in 6 months. Discussed.   - Comprehensive metabolic panel; Future  - CBC with Platelets & Differential; Future  - Lipid Profile; Future  - TSH; Future  - PSA tumor marker; Future  - PSA tumor marker; Future    Hypertriglyceridemia  Mild /has low HDL. No meds recommended. Pt to work on wt loss and add OTC fish oil/omega 3  - Comprehensive metabolic panel; Future  - CBC with Platelets & Differential; Future  - Lipid Profile; Future  - TSH; Future    Benign neoplasm of cranial nerve (H)  No s/s of recurrence  - Comprehensive metabolic panel; Future  - CBC with Platelets & Differential; Future  - Lipid Profile; Future  - TSH; Future    Morbid obesity (H)  Discussed - lost some now gained it back.   - Comprehensive metabolic panel; Future  - CBC with Platelets & Differential; Future  - Lipid Profile; Future  - TSH; Future    Chronic urticaria  Better but still persistant Continue current medications and behavioral changes.   Pt can double up Zyrtec as discussed   - Comprehensive metabolic panel; Future  - CBC with Platelets & Differential; Future  - Lipid Profile; Future  - TSH; Future              Counseling  Appropriate preventive services were discussed with this patient, including applicable screening as appropriate for fall prevention, nutrition, physical activity, Tobacco-use cessation, weight loss and cognition.  Checklist reviewing preventive services available has been given to the patient.  Reviewed patient's diet, addressing concerns and/or questions.   The patient's PHQ-9 score is  consistent with mild depression. He was provided with information regarding depression.           No follow-ups on file.    Subjective   Rajendra is a 58 year old, presenting for the following:  Physical        4/2/2024     3:23 PM   Additional Questions   Roomed by Rose Toscano   Accompanied by None         4/2/2024     3:23 PM   Patient Reported Additional Medications   Patient reports taking the following new medications None        Health Care Directive  Patient does not have a Health Care Directive or Living Will: Discussed advance care planning with patient; however, patient declined at this time.    HPI  No concerns           4/2/2024   General Health   How would you rate your overall physical health? Good   Feel stress (tense, anxious, or unable to sleep) To some extent   (!) STRESS CONCERN      4/2/2024   Nutrition   Three or more servings of calcium each day? Yes   Diet: Other   If other, please elaborate: weight watchers   How many servings of fruit and vegetables per day? (!) 2-3   How many sweetened beverages each day? 0-1         4/2/2024   Exercise   Days per week of moderate/strenous exercise 5 days   Average minutes spent exercising at this level 30 min         4/2/2024   Social Factors   Frequency of gathering with friends or relatives Once a week   Worry food won't last until get money to buy more No   Food not last or not have enough money for food? No   Do you have housing?  Yes   Are you worried about losing your housing? No   Lack of transportation? No   Unable to get utilities (heat,electricity)? No         10/27/2022   Fall Risk   Fallen 2 or more times in the past year? No          4/2/2024   Dental   Dentist two times every year? Yes         4/2/2024   TB Screening   Were you born outside of the [place]? No       Today's PHQ-9 Score:       4/2/2024     2:58 PM   PHQ-9 SCORE   PHQ-9 Total Score MyChart 5 (Mild depression)   PHQ-9 Total Score 5         4/2/2024   Substance Use   Alcohol more  than 3/day or more than 7/wk No   Do you use any other substances recreationally? No     Social History     Tobacco Use    Smoking status: Never     Passive exposure: Never    Smokeless tobacco: Never   Vaping Use    Vaping Use: Never used   Substance Use Topics    Alcohol use: Yes     Alcohol/week: 3.3 standard drinks of alcohol     Types: 4 Cans of beer per week     Comment: rarely    Drug use: No             4/2/2024   One time HIV Screening   Previous HIV test? Yes         4/2/2024   STI Screening   New sexual partner(s) since last STI/HIV test? No   Last PSA:   PSA   Date Value Ref Range Status   01/06/2021 <0.01 0 - 4 ug/L Final     Comment:     Assay Method:  Chemiluminescence using Siemens Vista analyzer     PSA Tumor Marker   Date Value Ref Range Status   09/28/2022 <0.01 0.00 - 4.00 ug/L Final     ASCVD Risk   The 10-year ASCVD risk score (Marnie NELSON, et al., 2019) is: 10.2%    Values used to calculate the score:      Age: 58 years      Sex: Male      Is Non- : No      Diabetic: No      Tobacco smoker: No      Systolic Blood Pressure: 139 mmHg      Is BP treated: No      HDL Cholesterol: 29 mg/dL      Total Cholesterol: 185 mg/dL           Reviewed and updated as needed this visit by Provider                    Past Medical History:   Diagnosis Date    Dysthymia 3/26/2012    Hearing loss in right ear 3/14/2012    History of atrial fibrillation 4/23/2012    Hypertriglyceridemia     Right acoustic neuroma 3/14/2012    Tinnitus, unspecified 3/14/2012    Umbilical hernia without obstruction and without gangrene 5/11/2016    Varicose veins     Varicose veins of both lower extremities 5/12/2017     Past Surgical History:   Procedure Laterality Date    acustic neuroma removed  03/2007    right    BIOPSY PROSTATE TRANSRECTAL N/A 02/05/2019    Procedure: TRANSRECTAL Ultrasound BIOPSY of PROSTATE;  Surgeon: Filemon Betts MD;  Location: [facility] OR    COLONOSCOPY N/A 01/03/2018    Repeat in  "2028//Procedure: COLONOSCOPY;  COLONOSCOPY;  Surgeon: Reggie Ron DO;  Location: [facility] OR    funnel chest[      states had chest reconstruction    HERNIA REPAIR      ORTHOPEDIC SURGERY      right knee surgery    PE TUBES      PROSTATECTOMY           Review of Systems  Constitutional, neuro, ENT, endocrine, pulmonary, cardiac, gastrointestinal, genitourinary, musculoskeletal, integument and psychiatric systems are negative, except as otherwise noted.     Objective    Exam  /83 (BP Location: Right arm, Patient Position: Sitting, Cuff Size: Adult Large)   Pulse 79   Temp 98.2  F (36.8  C) (Tympanic)   Resp 16   Ht 1.88 m (6' 2\")   Wt 127.2 kg (280 lb 8 oz)   SpO2 94%   BMI 36.01 kg/m     Estimated body mass index is 36.01 kg/m  as calculated from the following:    Height as of this encounter: 1.88 m (6' 2\").    Weight as of this encounter: 127.2 kg (280 lb 8 oz).    Physical Exam  GENERAL: alert and no distress  EYES: Eyes grossly normal to inspection, PERRL and conjunctivae and sclerae normal  HENT: ear canals and TM's normal, nose and mouth without ulcers or lesions  NECK: no adenopathy, no asymmetry, masses, or scars  RESP: lungs clear to auscultation - no rales, rhonchi or wheezes  CV: regular rate and rhythm, normal S1 S2, no S3 or S4, no murmur, click or rub, no peripheral edema  ABDOMEN: soft, nontender, no hepatosplenomegaly, no masses and bowel sounds normal   (male): normal male genitalia without lesions or urethral discharge, no hernia  MS: no gross musculoskeletal defects noted, no edema  SKIN: no suspicious lesions or rashes  NEURO: Normal strength and tone, mentation intact and speech normal  PSYCH: mentation appears normal, affect normal/bright  LYMPH: no cervical, supraclavicular, axillary, or inguinal adenopathy        Signed Electronically by: Bhupinder Mckeon MD    Answers submitted by the patient for this visit:  Patient Health Questionnaire (Submitted on 4/2/2024)  If you " checked off any problems, how difficult have these problems made it for you to do your work, take care of things at home, or get along with other people?: Not difficult at all  PHQ9 TOTAL SCORE: 5  CLAUDETTE-7 (Submitted on 4/2/2024)  CLAUDETTE 7 TOTAL SCORE: 4

## 2024-05-07 ENCOUNTER — OFFICE VISIT (OUTPATIENT)
Dept: OTOLARYNGOLOGY | Facility: OTHER | Age: 59
End: 2024-05-07
Attending: PHYSICIAN ASSISTANT
Payer: COMMERCIAL

## 2024-05-07 VITALS
WEIGHT: 280.43 LBS | BODY MASS INDEX: 35.99 KG/M2 | SYSTOLIC BLOOD PRESSURE: 148 MMHG | TEMPERATURE: 97.2 F | DIASTOLIC BLOOD PRESSURE: 97 MMHG | HEART RATE: 81 BPM | OXYGEN SATURATION: 98 % | HEIGHT: 74 IN | RESPIRATION RATE: 18 BRPM

## 2024-05-07 DIAGNOSIS — L50.9 URTICARIA: ICD-10-CM

## 2024-05-07 DIAGNOSIS — J34.3 NASAL TURBINATE HYPERTROPHY: ICD-10-CM

## 2024-05-07 DIAGNOSIS — L50.8 CHRONIC URTICARIA: ICD-10-CM

## 2024-05-07 DIAGNOSIS — J30.89 PERENNIAL ALLERGIC RHINITIS WITH SEASONAL VARIATION: Primary | ICD-10-CM

## 2024-05-07 DIAGNOSIS — J30.2 PERENNIAL ALLERGIC RHINITIS WITH SEASONAL VARIATION: Primary | ICD-10-CM

## 2024-05-07 PROCEDURE — 99213 OFFICE O/P EST LOW 20 MIN: CPT | Performed by: PHYSICIAN ASSISTANT

## 2024-05-07 RX ORDER — FLUTICASONE PROPIONATE 50 MCG
SPRAY, SUSPENSION (ML) NASAL
COMMUNITY
Start: 2023-09-07

## 2024-05-07 ASSESSMENT — PAIN SCALES - GENERAL: PAINLEVEL: NO PAIN (0)

## 2024-05-07 NOTE — LETTER
5/7/2024         RE: Rajendra Gagnon  [id number]  [place] [address]        Dear Colleague,    Thank you for referring your patient, Rajendra Gagnon, to the [facility]. Please see a copy of my visit note below.    Chief Complaint   Patient presents with     Follow Up     Allergy follow up    '    MQT-8/17/2022  Dilution #6-dust  Dilution #5-pigweed,  Birch, [place], Alternaria, cat  Dilution #2-distal, grass, maple, elm, oak, gayle, molds, dog     Today, he presents for allergy shot follow up. He has been using Zyrtec daily and Singulair. He may take Zyrtec twice a day PRN.   Patient reports he has been doing well, reports no concerns at this time. He has been tolerating SLIT w/o concerns. Denies oral itching, swelling.   He has been taking Singulair PRN.   He has been taking Vit C, Vit D with improvement.   He is on build up, currently.one additional vial before maintenance dose.   Epipens are up to date.        He has had previous work up in the past with Dr Mota, Dr Lundberg and Dr Boggs for urticaria.  He was allergy tested in April 2009, moderate sensitivities to grass, birch, dust, alternaria, thistle, elm, oak, grain smut and dog,  Low sensitives to ragweed maple, aspergillus, pigweed, kochia, gayle, pine.      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  He was treated with 3 months of keflex.   He has been treated with Zyrtec in the past with improvement in hives/angioedema symptoms.  In reviewing past medical records it was suggested that this could be idiopathic urticaria but definitive diagnosis was not made.  He did have a normal C1 inhibitor in April 2008.            Past Medical History:   Diagnosis Date     Dysthymia 3/26/2012     Hearing loss in right ear 3/14/2012     History of atrial fibrillation 4/23/2012     Hypertriglyceridemia      Right acoustic neuroma 3/14/2012     Tinnitus,  "unspecified 3/14/2012     Umbilical hernia without obstruction and without gangrene 5/11/2016     Varicose veins      Varicose veins of both lower extremities 5/12/2017        Allergies   Allergen Reactions     Aspartame Nausea     Venlafaxine Other (See Comments)     Night terrors     Zoloft [Sertraline] Other (See Comments)     Head rush, hives and agitation     Current Outpatient Medications   Medication Sig Dispense Refill     Ascorbic Acid (VITAMIN C) 500 MG CHEW Take 1,000 mg by mouth daily He has taken up to 2,000mg daily       Calcium-Magnesium-Zinc 333-133-5 MG TABS per tablet Take 1 tablet by mouth daily       cetirizine (ZYRTEC) 10 MG tablet Take 1 tablet (10 mg) by mouth 2 times daily 180 tablet 4     EPINEPHrine (ANY BX GENERIC EQUIV) 0.3 MG/0.3ML injection 2-pack Inject 0.3 mLs (0.3 mg) into the muscle as needed for anaphylaxis May repeat one time in 5-15 minutes if response to initial dose is inadequate. (Patient not taking: Reported on 11/21/2023) 2 each 1     montelukast (SINGULAIR) 10 MG tablet Take 1 tablet (10 mg) by mouth At Bedtime (Patient not taking: Reported on 4/2/2024) 90 tablet 4     Multiple Vitamins-Minerals (MULTIVITAMIN OR) Take 1 tablet by mouth daily with food       ORDER FOR ALLERGEN IMMUNOTHERAPY Continue allergen immunotherapy injections (SCIT). Follow standard buildup protocols. 5 mL PRN     SUBLINGUAL IMMUNOTHERAPY, SLIT, Place 1 drop under the tongue 3 times daily 1 Bottle 3     vitamin B complex with vitamin C (STRESS TAB) tablet Take 0.5 tablets by mouth daily       VITAMIN D, CHOLECALCIFEROL, PO Take 2,000 Units by mouth daily       vitamin E 400 units TABS Take 400 Units by mouth daily       No current facility-administered medications for this visit.     ROS- SEE HPI  BP (!) 148/97 (BP Location: Right arm, Patient Position: Sitting, Cuff Size: Adult Large)   Pulse 81   Temp 97.2  F (36.2  C) (Temporal)   Resp 18   Ht 1.88 m (6' 2.02\")   Wt 127.2 kg (280 lb 6.8 oz)   " SpO2 98%   BMI 35.99 kg/m      General - The patient is well nourished and well developed, and appears to have good nutritional status.  Alert and oriented to person and place, answers questions and cooperates with examination appropriately.   Head and Face - Normocephalic and atraumatic, with no gross asymmetry noted.  The facial nerve is intact, with strong symmetric movements.  Voice and Breathing - The patient was breathing comfortably without the use of accessory muscles. There was no wheezing, stridor, or stertor.  The patients voice was clear and strong, and had appropriate pitch and quality.  Ears -The external auditory canals are patent, the tympanic membranes are intact without effusion, retraction or mass.  Bony landmarks are intact.  Eyes - Extraocular movements intact, and the pupils were reactive to light.  Sclera were not icteric or injected, conjunctiva were pink and moist.  Mouth - Examination of the oral cavity showed pink, healthy oral mucosa. No lesions or ulcerations noted.  The tongue was mobile and midline, and the dentition were in good condition.    Throat - The walls of the oropharynx were smooth, pink, moist, symmetric, and had no lesions or ulcerations.  The tonsillar pillars and soft palate were symmetric.  The uvula was midline on elevation.  FTP IIb  Neck - Normal midline excursion of the laryngotracheal complex during swallowing.  Full range of motion on passive movement.  Palpation of the occipital, submental, submandibular, internal jugular chain, and supraclavicular nodes did not demonstrate any abnormal lymph nodes or masses.  Palpation of the thyroid was soft and smooth, with no nodules or goiter appreciated.  The trachea was mobile and midline.  Nose - External contour is symmetric, no gross deflection or scars.  Nasal mucosa is pink and moist with no abnormal mucus.  No polyps, masses, or purulence noted on examination.  Nasal turbinate hypertrophy. Mild nasal valve collapse.           ASSESSMENT/ PLAN:    ICD-10-CM    1. Perennial allergic rhinitis with seasonal variation  J30.89     J30.2       2. Urticaria  L50.9       3. Chronic urticaria  L50.8       4. Nasal turbinate hypertrophy  J34.3         He is doing well. Reports his allergies have been doing very well.       Continue with allergy drops. Maintain three drops daily.   Continue with Zyrtec, Singulair.       Annual allergy follow up.       Jeanna Celeste PA-C  ENT  [facility], [place]      Again, thank you for allowing me to participate in the care of your patient.        Sincerely,        Jeanna Celeste PA-C

## 2024-05-07 NOTE — PROGRESS NOTES
Chief Complaint   Patient presents with    Follow Up     Allergy follow up    '    MQT-8/17/2022  Dilution #6-dust  Dilution #5-pigweed,  Birch, [place], Alternaria, cat  Dilution #2-distal, grass, maple, elm, oak, gayle, molds, dog     Today, he presents for allergy shot follow up. He has been using Zyrtec daily and Singulair. He may take Zyrtec twice a day PRN.   Patient reports he has been doing well, reports no concerns at this time. He has been tolerating SLIT w/o concerns. Denies oral itching, swelling.   He has been taking Singulair PRN.   He has been taking Vit C, Vit D with improvement.   He is on build up, currently.one additional vial before maintenance dose.   Epipens are up to date.        He has had previous work up in the past with Dr Mota, Dr Lundberg and Dr Boggs for urticaria.  He was allergy tested in April 2009, moderate sensitivities to grass, birch, dust, alternaria, thistle, elm, oak, grain smut and dog,  Low sensitives to ragweed maple, aspergillus, pigweed, kochia, gayle, pine.      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  He was treated with 3 months of keflex.   He has been treated with Zyrtec in the past with improvement in hives/angioedema symptoms.  In reviewing past medical records it was suggested that this could be idiopathic urticaria but definitive diagnosis was not made.  He did have a normal C1 inhibitor in April 2008.            Past Medical History:   Diagnosis Date    Dysthymia 3/26/2012    Hearing loss in right ear 3/14/2012    History of atrial fibrillation 4/23/2012    Hypertriglyceridemia     Right acoustic neuroma 3/14/2012    Tinnitus, unspecified 3/14/2012    Umbilical hernia without obstruction and without gangrene 5/11/2016    Varicose veins     Varicose veins of both lower extremities 5/12/2017        Allergies   Allergen Reactions    Aspartame Nausea    Venlafaxine Other (See Comments)      "Night terrors    Zoloft [Sertraline] Other (See Comments)     Head rush, hives and agitation     Current Outpatient Medications   Medication Sig Dispense Refill    Ascorbic Acid (VITAMIN C) 500 MG CHEW Take 1,000 mg by mouth daily He has taken up to 2,000mg daily      Calcium-Magnesium-Zinc 333-133-5 MG TABS per tablet Take 1 tablet by mouth daily      cetirizine (ZYRTEC) 10 MG tablet Take 1 tablet (10 mg) by mouth 2 times daily 180 tablet 4    EPINEPHrine (ANY BX GENERIC EQUIV) 0.3 MG/0.3ML injection 2-pack Inject 0.3 mLs (0.3 mg) into the muscle as needed for anaphylaxis May repeat one time in 5-15 minutes if response to initial dose is inadequate. (Patient not taking: Reported on 11/21/2023) 2 each 1    montelukast (SINGULAIR) 10 MG tablet Take 1 tablet (10 mg) by mouth At Bedtime (Patient not taking: Reported on 4/2/2024) 90 tablet 4    Multiple Vitamins-Minerals (MULTIVITAMIN OR) Take 1 tablet by mouth daily with food      ORDER FOR ALLERGEN IMMUNOTHERAPY Continue allergen immunotherapy injections (SCIT). Follow standard buildup protocols. 5 mL PRN    SUBLINGUAL IMMUNOTHERAPY, SLIT, Place 1 drop under the tongue 3 times daily 1 Bottle 3    vitamin B complex with vitamin C (STRESS TAB) tablet Take 0.5 tablets by mouth daily      VITAMIN D, CHOLECALCIFEROL, PO Take 2,000 Units by mouth daily      vitamin E 400 units TABS Take 400 Units by mouth daily       No current facility-administered medications for this visit.     ROS- SEE HPI  BP (!) 148/97 (BP Location: Right arm, Patient Position: Sitting, Cuff Size: Adult Large)   Pulse 81   Temp 97.2  F (36.2  C) (Temporal)   Resp 18   Ht 1.88 m (6' 2.02\")   Wt 127.2 kg (280 lb 6.8 oz)   SpO2 98%   BMI 35.99 kg/m      General - The patient is well nourished and well developed, and appears to have good nutritional status.  Alert and oriented to person and place, answers questions and cooperates with examination appropriately.   Head and Face - Normocephalic and " atraumatic, with no gross asymmetry noted.  The facial nerve is intact, with strong symmetric movements.  Voice and Breathing - The patient was breathing comfortably without the use of accessory muscles. There was no wheezing, stridor, or stertor.  The patients voice was clear and strong, and had appropriate pitch and quality.  Ears -The external auditory canals are patent, the tympanic membranes are intact without effusion, retraction or mass.  Bony landmarks are intact.  Eyes - Extraocular movements intact, and the pupils were reactive to light.  Sclera were not icteric or injected, conjunctiva were pink and moist.  Mouth - Examination of the oral cavity showed pink, healthy oral mucosa. No lesions or ulcerations noted.  The tongue was mobile and midline, and the dentition were in good condition.    Throat - The walls of the oropharynx were smooth, pink, moist, symmetric, and had no lesions or ulcerations.  The tonsillar pillars and soft palate were symmetric.  The uvula was midline on elevation.  FTP IIb  Neck - Normal midline excursion of the laryngotracheal complex during swallowing.  Full range of motion on passive movement.  Palpation of the occipital, submental, submandibular, internal jugular chain, and supraclavicular nodes did not demonstrate any abnormal lymph nodes or masses.  Palpation of the thyroid was soft and smooth, with no nodules or goiter appreciated.  The trachea was mobile and midline.  Nose - External contour is symmetric, no gross deflection or scars.  Nasal mucosa is pink and moist with no abnormal mucus.  No polyps, masses, or purulence noted on examination.  Nasal turbinate hypertrophy. Mild nasal valve collapse.          ASSESSMENT/ PLAN:    ICD-10-CM    1. Perennial allergic rhinitis with seasonal variation  J30.89     J30.2       2. Urticaria  L50.9       3. Chronic urticaria  L50.8       4. Nasal turbinate hypertrophy  J34.3         He is doing well. Reports his allergies have been  doing very well.       Continue with allergy drops. Maintain three drops daily.   Continue with Zyrtec, Singulair.       Annual allergy follow up.       Jeanna Celeste PA-C  ENT  [facility], [place]

## 2024-05-07 NOTE — PATIENT INSTRUCTIONS
Continue with allergy drops. Maintain three drops daily.   Continue with Zyrtec, Singulair.   Annual allergy follow up.       Thank you for allowing Jeanna Celeste PA-C and our ENT team to participate in your care.  If your medications are too expensive, please give the nurse a call.  We can possibly change this medication.  If you have a scheduling or an appointment question please contact our Health Unit Coordinator at [phone], Ext. [phone].    ALL nursing questions or concerns can be directed to your ENT nurse at: [phone] [place]

## 2024-05-31 ENCOUNTER — APPOINTMENT (OUTPATIENT)
Dept: CT IMAGING | Facility: HOSPITAL | Age: 59
End: 2024-05-31
Attending: EMERGENCY MEDICINE
Payer: COMMERCIAL

## 2024-05-31 ENCOUNTER — HOSPITAL ENCOUNTER (EMERGENCY)
Facility: HOSPITAL | Age: 59
Discharge: HOME | End: 2024-05-31
Attending: EMERGENCY MEDICINE | Admitting: EMERGENCY MEDICINE
Payer: COMMERCIAL

## 2024-05-31 VITALS
RESPIRATION RATE: 18 BRPM | TEMPERATURE: 98.2 F | HEART RATE: 72 BPM | DIASTOLIC BLOOD PRESSURE: 87 MMHG | SYSTOLIC BLOOD PRESSURE: 132 MMHG | OXYGEN SATURATION: 98 %

## 2024-05-31 DIAGNOSIS — J32.0 CHRONIC SINUSITIS OF BOTH MAXILLARY SINUSES: ICD-10-CM

## 2024-05-31 PROCEDURE — 70450 CT HEAD/BRAIN W/O DYE: CPT

## 2024-05-31 PROCEDURE — 70486 CT MAXILLOFACIAL W/O DYE: CPT

## 2024-05-31 PROCEDURE — 99284 EMERGENCY DEPT VISIT MOD MDM: CPT | Performed by: EMERGENCY MEDICINE

## 2024-05-31 PROCEDURE — 99284 EMERGENCY DEPT VISIT MOD MDM: CPT | Mod: 25

## 2024-05-31 ASSESSMENT — COLUMBIA-SUICIDE SEVERITY RATING SCALE - C-SSRS
2. HAVE YOU ACTUALLY HAD ANY THOUGHTS OF KILLING YOURSELF IN THE PAST MONTH?: NO
1. IN THE PAST MONTH, HAVE YOU WISHED YOU WERE DEAD OR WISHED YOU COULD GO TO SLEEP AND NOT WAKE UP?: NO
6. HAVE YOU EVER DONE ANYTHING, STARTED TO DO ANYTHING, OR PREPARED TO DO ANYTHING TO END YOUR LIFE?: NO

## 2024-05-31 ASSESSMENT — ACTIVITIES OF DAILY LIVING (ADL)
ADLS_ACUITY_SCORE: 35
ADLS_ACUITY_SCORE: 35

## 2024-05-31 NOTE — ED NOTES
Pt reports that his numbness is improving. Pt has been taking 10 mg zyrtec daily and a dose of singular daily 4-5 days per week. Pt states his provider advised him to take zyrtec BID and singular daily for his allergic reactions. Pt is undergoing allergy treatment via drops. Pt states numbness was circumferential around the mouth and on the right side of his face. 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

## 2024-05-31 NOTE — ED NOTES
Patient discharged to Home at this time. Patient given written and verbal discharge instructions regarding home care, follow-up, and medications. Patient verbalized understanding of all discharge instructions. Rest and hydration encouraged. Patient encouraged to return to the ED if they experience  new, worsening, or concerning symptoms.     Patients RX for Augmentin sent to [place] pharmacy.    Patient to follow-up with PCP as needed.

## 2024-05-31 NOTE — ED PROVIDER NOTES
S: Pt is a 60 yo male who presents to the ED with his wife.  CC is dizziness, sinus congestion, anxiety, and chronic allergies.  Pt noticed some slurred speech and increased dizziness at 0630 this morning after taking his Zyrtec.  Pt has a long hx of allergies and takes Zyrtec morning and evening.  He also admits to being under a lot of stress lately.  Pt denies any muscle weakness and was able to walk into the ED without a problem.  He has some obvious pressured speech on arrival to the ED.  Pt has also noticed some perioral numbness today which is not unusual for him since having surgery to remove R acoustic neuroma in 2007.  Other PMH includes A fib, dysthymia, chronic urticaria, and prostate CA.          Complete multisystem ROS except as mentioned above negative.  Past Medical History:   Diagnosis Date    Dysthymia 3/26/2012    Hearing loss in right ear 3/14/2012    History of atrial fibrillation 4/23/2012    Hypertriglyceridemia     Right acoustic neuroma 3/14/2012    Tinnitus, unspecified 3/14/2012    Umbilical hernia without obstruction and without gangrene 5/11/2016    Varicose veins     Varicose veins of both lower extremities 5/12/2017     Patient Active Problem List   Diagnosis    Benign neoplasm of cranial nerve (H)    Dysthymia    History of atrial fibrillation    Hypertriglyceridemia    ACP (advance care planning)    Umbilical hernia without obstruction and without gangrene    Right acoustic neuroma    Varicose veins of both lower extremities    Adjustment disorder with mixed anxiety and depressed mood    Primary malignant neoplasm of prostate (H)    Morbid obesity (H)    Chronic urticaria    Atrial fibrillation (H)    CLAUDETTE (generalized anxiety disorder)     Past Surgical History:   Procedure Laterality Date    acustic neuroma removed  03/2007    right    BIOPSY PROSTATE TRANSRECTAL N/A 02/05/2019    Procedure: TRANSRECTAL Ultrasound BIOPSY of PROSTATE;  Surgeon: Filemon Betts MD;  Location:  OR     COLONOSCOPY N/A 01/03/2018    Repeat in 2028//Procedure: COLONOSCOPY;  COLONOSCOPY;  Surgeon: Reggie Ron DO;  Location: [facility] OR    funnel chest[      states had chest reconstruction    HERNIA REPAIR      ORTHOPEDIC SURGERY      right knee surgery    PE TUBES      PROSTATECTOMY       Family History   Problem Relation Age of Onset    Diabetes Mother         type 2    Hypertension Mother     Heart Disease Mother     Lipids Mother     Alcohol/Drug Father     Cancer Maternal Grandmother     Eye Disorder Maternal Grandfather     Heart Disease Maternal Grandfather     Cancer Maternal Grandfather     Cancer Paternal Grandmother     Prostate Cancer Paternal Grandfather     Fibromyalgia Brother     Other - See Comments Daughter         lupus    Kidney Disease Daughter     Kidney Disease Daughter      Social History     Tobacco Use    Smoking status: Never     Passive exposure: Never    Smokeless tobacco: Never   Vaping Use    Vaping status: Never Used   Substance Use Topics    Alcohol use: Yes     Alcohol/week: 3.3 standard drinks of alcohol     Types: 4 Cans of beer per week     Comment: rarely    Drug use: No     O:BP (!) 143/105   Pulse 79   Temp 98.2  F (36.8  C) (Tympanic)   Resp 16   SpO2 97%   Gen - pleasant, cooperative, in NAD  Neuro - A&O x 3, CN II-XII intact, gait is steady, tongue is midline, no receptive or expressive aphasia, no slurred speech, muscle strength is 5/5 in the upper and lower extr bilaterally, FNF is accurate bilaterally, Rhomberg is negative  HEENT - PERRLA bilaterally, EOMI bilaterally, no conjunctival injection, post oropharynx is nonerythematous with no tonsillar hypertrophy or exudates, TM's are clear and intact bilaterally but with serous fluid behind both  CV - RRR with no murmurs, clicks, or rubs  Resp -  CTAB with no wheezes  Abd - soft, NT, nl BS, no distention  Extr - no LE edema  Skin - w/d/i    CT Sinus w/o Contrast    Result Date: 5/31/2024  CT SINUS W/O CONTRAST,  5/31/2024 2:24 PM History: Male, age 59 years; dizziness with hx of allergies and sinus issues Comparison: No relevant prior imaging. TECHNIQUE: CT was performed of the paranasal sinuses. Axial, sagittal and coronal images were reviewed. If present, MIP and/or 3-D images were constructed by the technologist. FINDINGS: Mucosal thickening is seen in the inferior portions of the left and right maxillary sinus, more evident on the left. There is also mucosal thickening, also polypoid in appearance involving the mid and anterior ethmoid air cells, more evident on the right. There is occlusion of the left ostium, it is unclear if this is related to mucosal thickening and/or debris.     IMPRESSION: Bilateral maxillary sinusitis with scattered, polypoid mucosal thickening of the ethmoid air cells. This facility minimizes radiation dose by adjusting the mA and/or kV according to each patient size. This CT scan was performed using one or more the following dose reduction techniques: -Automated exposure control, -Adjustment of the mA and/or kV according to patient's size, and/or, -Use of iterative reconstruction technique. ERNIE STEVENS MD   SYSTEM ID:  [id number]    Head CT w/o contrast    Result Date: 5/31/2024  PROCEDURE: CT HEAD W/O CONTRAST 5/31/2024 2:22 PM HISTORY:Male, age,  59 years, , , dizziness with chronic sinus issues COMPARISON: 2/17/2023 TECHNIQUE: CT of the brain without contrast. Axial; sagittal and coronal reconstructed images were reviewed. If present, MIP and/or 3-D images were constructed by the technologist. FINDINGS: Ventricles and sulci are normal in size and shape. Gray and white matter demonstrate normal differentiation. There is no evidence of mass, mass effect or midline shift. No evidence of acute hemorrhage. Polypoid mucosal thickening of the left maxillary sinus. No acute fracture.     IMPRESSION: No acute intracranial abnormality.  No acute fracture. Left maxillary sinusitis. This facility  minimizes radiation dose by adjusting the mA and/or kV according to each patient size. This CT scan was performed using one or more the following dose reduction techniques: -Automated exposure control, -Adjustment of the mA and/or kV according to patient's size, and/or, -Use of iterative reconstruction technique. ERNIE STEVENS MD   SYSTEM ID:  [id number]     A:   1. Chronic sinusitis of both maxillary sinuses        P: Rx for 10 days of Augmentin 875mg bid      Increase daily water intake      Use either Flonase or Nasonex daily x 10-14 days      Continue other usual meds       F/U with PCP as needed     Guerda Edwards DO  05/31/24 [id number]

## 2024-05-31 NOTE — ED TRIAGE NOTES
Patient feels lightheaded, slight dizziness. More numbness in the right side. Symmetry is off normally due to previous brain surgery.  Slurred and slower speech this am. Balance still feels slightly off. Head feels not right. Neuro eval requested at 1318. Provider in at 1320

## 2024-05-31 NOTE — DISCHARGE INSTRUCTIONS
Augmentin 1 pill twice a day with food x 10 days.  Use either over-the-counter Flonase or Nasonex nasal spray daily for at least 10-14 days.  Drink at least 40-60 oz of plain water daily.  Continue your other medications as usual.  Followup with your clinic doctor as needed.

## 2024-06-17 ENCOUNTER — TELEPHONE (OUTPATIENT)
Dept: ALLERGY | Facility: OTHER | Age: 59
End: 2024-06-17

## 2024-06-17 NOTE — TELEPHONE ENCOUNTER
"Call from Pt. Pt verified by name and . Pt requests allergy drop refill. Pt denied the need for a new epi pen, denied questions about the drops, but was transferred to scheduling to get set up with an appointment for \"a possible wheat intolerance'\"  "

## 2024-06-18 ENCOUNTER — APPOINTMENT (OUTPATIENT)
Dept: CT IMAGING | Facility: HOSPITAL | Age: 59
End: 2024-06-18
Attending: NURSE PRACTITIONER
Payer: COMMERCIAL

## 2024-06-18 ENCOUNTER — APPOINTMENT (OUTPATIENT)
Dept: GENERAL RADIOLOGY | Facility: HOSPITAL | Age: 59
End: 2024-06-18
Attending: NURSE PRACTITIONER
Payer: COMMERCIAL

## 2024-06-18 ENCOUNTER — HOSPITAL ENCOUNTER (EMERGENCY)
Facility: HOSPITAL | Age: 59
Discharge: HOME | End: 2024-06-18
Attending: NURSE PRACTITIONER | Admitting: NURSE PRACTITIONER
Payer: COMMERCIAL

## 2024-06-18 VITALS
WEIGHT: 283.73 LBS | BODY MASS INDEX: 36.41 KG/M2 | DIASTOLIC BLOOD PRESSURE: 89 MMHG | RESPIRATION RATE: 16 BRPM | TEMPERATURE: 97.9 F | OXYGEN SATURATION: 98 % | SYSTOLIC BLOOD PRESSURE: 143 MMHG | HEART RATE: 71 BPM

## 2024-06-18 DIAGNOSIS — R42 EPISODIC LIGHTHEADEDNESS: ICD-10-CM

## 2024-06-18 DIAGNOSIS — R07.9 CHEST PAIN: ICD-10-CM

## 2024-06-18 LAB
ALBUMIN SERPL BCG-MCNC: 4.4 G/DL (ref 3.5–5.2)
ALP SERPL-CCNC: 69 U/L (ref 40–150)
ALT SERPL W P-5'-P-CCNC: 27 U/L (ref 0–70)
ANION GAP SERPL CALCULATED.3IONS-SCNC: 11 MMOL/L (ref 7–15)
AST SERPL W P-5'-P-CCNC: 29 U/L (ref 0–45)
ATRIAL RATE - MUSE: 73 BPM
BASOPHILS # BLD AUTO: 0 10E3/UL (ref 0–0.2)
BASOPHILS NFR BLD AUTO: 1 %
BILIRUB SERPL-MCNC: 0.7 MG/DL
BUN SERPL-MCNC: 16 MG/DL (ref 6–20)
CALCIUM SERPL-MCNC: 8.9 MG/DL (ref 8.6–10)
CHLORIDE SERPL-SCNC: 105 MMOL/L (ref 98–107)
CREAT SERPL-MCNC: 0.97 MG/DL (ref 0.67–1.17)
DEPRECATED HCO3 PLAS-SCNC: 22 MMOL/L (ref 22–29)
DIASTOLIC BLOOD PRESSURE - MUSE: NORMAL MMHG
EGFRCR SERPLBLD CKD-EPI 2021: >90 ML/MIN/1.73M2
EOSINOPHIL # BLD AUTO: 0.1 10E3/UL (ref 0–0.7)
EOSINOPHIL NFR BLD AUTO: 1 %
ERYTHROCYTE [DISTWIDTH] IN BLOOD BY AUTOMATED COUNT: 13 % (ref 10–15)
GLUCOSE SERPL-MCNC: 106 MG/DL (ref 70–99)
HCT VFR BLD AUTO: 43.1 % (ref 40–53)
HGB BLD-MCNC: 15 G/DL (ref 13.3–17.7)
HOLD SPECIMEN: NORMAL
IMM GRANULOCYTES # BLD: 0 10E3/UL
IMM GRANULOCYTES NFR BLD: 0 %
INTERPRETATION ECG - MUSE: NORMAL
LYMPHOCYTES # BLD AUTO: 0.9 10E3/UL (ref 0.8–5.3)
LYMPHOCYTES NFR BLD AUTO: 15 %
MAGNESIUM SERPL-MCNC: 2.1 MG/DL (ref 1.7–2.3)
MCH RBC QN AUTO: 30.1 PG (ref 26.5–33)
MCHC RBC AUTO-ENTMCNC: 34.8 G/DL (ref 31.5–36.5)
MCV RBC AUTO: 86 FL (ref 78–100)
MONOCYTES # BLD AUTO: 0.4 10E3/UL (ref 0–1.3)
MONOCYTES NFR BLD AUTO: 6 %
NEUTROPHILS # BLD AUTO: 4.6 10E3/UL (ref 1.6–8.3)
NEUTROPHILS NFR BLD AUTO: 77 %
NRBC # BLD AUTO: 0 10E3/UL
NRBC BLD AUTO-RTO: 0 /100
P AXIS - MUSE: 70 DEGREES
PLATELET # BLD AUTO: 176 10E3/UL (ref 150–450)
POTASSIUM SERPL-SCNC: 3.9 MMOL/L (ref 3.4–5.3)
PR INTERVAL - MUSE: 178 MS
PROT SERPL-MCNC: 7 G/DL (ref 6.4–8.3)
QRS DURATION - MUSE: 86 MS
QT - MUSE: 364 MS
QTC - MUSE: 401 MS
R AXIS - MUSE: 113 DEGREES
RBC # BLD AUTO: 4.99 10E6/UL (ref 4.4–5.9)
SODIUM SERPL-SCNC: 138 MMOL/L (ref 135–145)
SYSTOLIC BLOOD PRESSURE - MUSE: NORMAL MMHG
T AXIS - MUSE: 55 DEGREES
TROPONIN T SERPL HS-MCNC: <6 NG/L
VENTRICULAR RATE- MUSE: 73 BPM
WBC # BLD AUTO: 6 10E3/UL (ref 4–11)

## 2024-06-18 PROCEDURE — 71046 X-RAY EXAM CHEST 2 VIEWS: CPT

## 2024-06-18 PROCEDURE — 84484 ASSAY OF TROPONIN QUANT: CPT | Performed by: NURSE PRACTITIONER

## 2024-06-18 PROCEDURE — 83735 ASSAY OF MAGNESIUM: CPT | Performed by: NURSE PRACTITIONER

## 2024-06-18 PROCEDURE — 70450 CT HEAD/BRAIN W/O DYE: CPT | Mod: XS

## 2024-06-18 PROCEDURE — 82247 BILIRUBIN TOTAL: CPT | Performed by: NURSE PRACTITIONER

## 2024-06-18 PROCEDURE — 36415 COLL VENOUS BLD VENIPUNCTURE: CPT | Performed by: NURSE PRACTITIONER

## 2024-06-18 PROCEDURE — 85048 AUTOMATED LEUKOCYTE COUNT: CPT | Performed by: NURSE PRACTITIONER

## 2024-06-18 PROCEDURE — 93005 ELECTROCARDIOGRAM TRACING: CPT

## 2024-06-18 PROCEDURE — 99284 EMERGENCY DEPT VISIT MOD MDM: CPT | Performed by: NURSE PRACTITIONER

## 2024-06-18 PROCEDURE — 99285 EMERGENCY DEPT VISIT HI MDM: CPT | Mod: 25

## 2024-06-18 PROCEDURE — 70496 CT ANGIOGRAPHY HEAD: CPT

## 2024-06-18 PROCEDURE — 93010 ELECTROCARDIOGRAM REPORT: CPT | Performed by: INTERNAL MEDICINE

## 2024-06-18 PROCEDURE — 250N000011 HC RX IP 250 OP 636: Performed by: RADIOLOGY

## 2024-06-18 RX ORDER — IOPAMIDOL 755 MG/ML
67 INJECTION, SOLUTION INTRAVASCULAR ONCE
Status: COMPLETED | OUTPATIENT
Start: 2024-06-18 | End: 2024-06-18

## 2024-06-18 RX ADMIN — IOPAMIDOL 67 ML: 755 INJECTION, SOLUTION INTRAVENOUS at 13:34

## 2024-06-18 ASSESSMENT — COLUMBIA-SUICIDE SEVERITY RATING SCALE - C-SSRS
1. IN THE PAST MONTH, HAVE YOU WISHED YOU WERE DEAD OR WISHED YOU COULD GO TO SLEEP AND NOT WAKE UP?: NO
2. HAVE YOU ACTUALLY HAD ANY THOUGHTS OF KILLING YOURSELF IN THE PAST MONTH?: NO
6. HAVE YOU EVER DONE ANYTHING, STARTED TO DO ANYTHING, OR PREPARED TO DO ANYTHING TO END YOUR LIFE?: NO

## 2024-06-18 ASSESSMENT — ENCOUNTER SYMPTOMS
DIZZINESS: 1
ABDOMINAL PAIN: 0
RESPIRATORY NEGATIVE: 1
BLOOD IN STOOL: 0
HEADACHES: 0
FACIAL ASYMMETRY: 1
MUSCULOSKELETAL NEGATIVE: 1
ENDOCRINE NEGATIVE: 1
CONSTIPATION: 0
TREMORS: 0
NAUSEA: 1
ALLERGIC/IMMUNOLOGIC NEGATIVE: 1
WEAKNESS: 0
CONSTITUTIONAL NEGATIVE: 1
HEMATOLOGIC/LYMPHATIC NEGATIVE: 1
NUMBNESS: 0
DIARRHEA: 0
LIGHT-HEADEDNESS: 1
VOMITING: 0
SEIZURES: 0
NERVOUS/ANXIOUS: 1

## 2024-06-18 ASSESSMENT — ACTIVITIES OF DAILY LIVING (ADL)
ADLS_ACUITY_SCORE: 35

## 2024-06-18 NOTE — DISCHARGE INSTRUCTIONS
Follow-up with your primary care provider for reevaluation.  Contact your primary care provider if you have any questions or concerns.  Do not hesitate to return to the ER if any new or worsening symptoms.     Please read the attached instructions (if any).  They highlight more specific treatments and interventions for you at home.              Thank you for letting me participate in your care and wish you a fast and uneventful recovery,    Robert FOOTE CNP    Do not hesitate to contact me with questions or concerns.  [email or web address]

## 2024-06-18 NOTE — ED PROVIDER NOTES
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  Patient  has been having dizziness and lightheadedness intermittently for the past couple weeks.  States that today was at work and started to have some dizziness and lightheadedness with shakiness and a little bit of chest pressure.  For this reason patient comes in.  Chest pain/pressure has resolved.  Continues to have some lightheadedness.  Nausea is also improved.  Patient  was seen 3 weeks ago for \"head symptoms\" and diagnosed with sinus infection.  Was placed on antibiotics with improvement of symptoms.  Now symptoms have come back.  No headache reported.   has a lot of sinus drainage at this time.  No fever or chills.     Allergies:  Allergies   Allergen Reactions    Aspartame Nausea    Venlafaxine Other (See Comments)     Night terrors    Zoloft [Sertraline] Other (See Comments)     Head rush, hives and agitation       Problem List:    Patient Active Problem List    Diagnosis Date Noted    CLAUDETTE (generalized anxiety disorder) 01/17/2023     Priority: Medium    Morbid obesity (H) 01/06/2021     Priority: Medium    Chronic urticaria 01/06/2021     Priority: Medium    Primary malignant neoplasm of prostate (H) 02/14/2019     Priority: Medium     Added automatically from request for surgery [id number]      Adjustment disorder with mixed anxiety and depressed mood 11/14/2017     Priority: Medium    Varicose veins of both lower extremities 05/12/2017     Priority: Medium    ACP (advance care planning) 05/11/2016     Priority: Medium     Advance Care Planning 5/11/2016: ACP Review of Chart / Resources Provided:  Reviewed chart for advance care plan.  Rajendra Gagnon has no plan or code status on " file. Discussed available resources and provided with information.   Added by Juan A Vitale            Umbilical hernia without obstruction and without gangrene 05/11/2016     Priority: Medium    Hypertriglyceridemia      Priority: Medium    History of atrial fibrillation 04/23/2012     Priority: Medium    Dysthymia 03/26/2012     Priority: Medium    Benign neoplasm of cranial nerve (H) 03/14/2012     Priority: Medium     Problem list name updated by automated process. Provider to review      Right acoustic neuroma 03/14/2012     Priority: Medium    Atrial fibrillation (H) 01/01/2012     Priority: Medium        Past Medical History:    Past Medical History:   Diagnosis Date    Dysthymia 3/26/2012    Hearing loss in right ear 3/14/2012    History of atrial fibrillation 4/23/2012    Hypertriglyceridemia     Right acoustic neuroma 3/14/2012    Tinnitus, unspecified 3/14/2012    Umbilical hernia without obstruction and without gangrene 5/11/2016    Varicose veins     Varicose veins of both lower extremities 5/12/2017       Past Surgical History:    Past Surgical History:   Procedure Laterality Date    acustic neuroma removed  03/2007    right    BIOPSY PROSTATE TRANSRECTAL N/A 02/05/2019    Procedure: TRANSRECTAL Ultrasound BIOPSY of PROSTATE;  Surgeon: Filemon Betts MD;  Location: [facility] OR    COLONOSCOPY N/A 01/03/2018    Repeat in 2028//Procedure: COLONOSCOPY;  COLONOSCOPY;  Surgeon: Reggie Ron DO;  Location: [facility] OR    funnel chest[      states had chest reconstruction    HERNIA REPAIR      ORTHOPEDIC SURGERY      right knee surgery    PE TUBES      PROSTATECTOMY         Family History:    Family History   Problem Relation Age of Onset    Diabetes Mother         type 2    Hypertension Mother     Heart Disease Mother     Lipids Mother     Alcohol/Drug Father     Cancer Maternal Grandmother     Eye Disorder Maternal Grandfather     Heart Disease Maternal Grandfather     Cancer Maternal Grandfather     Cancer  Paternal Grandmother     Prostate Cancer Paternal Grandfather     Fibromyalgia Brother     Other - See Comments Daughter         lupus    Kidney Disease Daughter     Kidney Disease Daughter        Social History:  Marital Status:   [2]  Social History     Tobacco Use    Smoking status: Never     Passive exposure: Never    Smokeless tobacco: Never   Vaping Use    Vaping status: Never Used   Substance Use Topics    Alcohol use: Yes     Alcohol/week: 3.3 standard drinks of alcohol     Types: 4 Cans of beer per week     Comment: rarely    Drug use: No        Medications:    Ascorbic Acid (VITAMIN C) 500 MG CHEW  ASPIRIN 81 PO  Calcium-Magnesium-Zinc 333-133-5 MG TABS per tablet  cetirizine (ZYRTEC) 10 MG tablet  EPINEPHrine (ANY BX GENERIC EQUIV) 0.3 MG/0.3ML injection 2-pack  fluticasone (FLONASE) 50 MCG/ACT nasal spray  montelukast (SINGULAIR) 10 MG tablet  Multiple Vitamins-Minerals (MULTIVITAMIN OR)  SUBLINGUAL IMMUNOTHERAPY, SLIT,  vitamin B complex with vitamin C (STRESS TAB) tablet  VITAMIN D, CHOLECALCIFEROL, PO  vitamin E 400 units TABS  ORDER FOR ALLERGEN IMMUNOTHERAPY          Review of Systems   Constitutional: Negative.    HENT:  Positive for postnasal drip.    Eyes:  Positive for visual disturbance (Chronic right visual disturbance that is unchanged).   Respiratory: Negative.     Cardiovascular:  Positive for chest pain and leg swelling.   Gastrointestinal:  Positive for nausea. Negative for abdominal pain, blood in stool, constipation, diarrhea and vomiting.   Endocrine: Negative.    Genitourinary: Negative.    Musculoskeletal: Negative.    Skin: Negative.    Allergic/Immunologic: Negative.    Neurological:  Positive for dizziness, facial asymmetry (Chronic right facial droop) and light-headedness. Negative for tremors, seizures, syncope, weakness, numbness and headaches.   Hematological: Negative.    Psychiatric/Behavioral:  The patient is nervous/anxious.        Physical Exam   BP: (!)  155/105  Pulse: 77  Temp: 97.8  F (36.6  C)  Resp: 18  Weight: 128.7 kg (283 lb 11.7 oz)  SpO2: 99 %      GENERAL APPEARANCE:  The patient is a 59 year old well-developed, well-nourished individual in no acute distress that appears as stated age.  HEENT:  Normocephalic and atraumatic.  Pupils are equal, round, and reactive to light.  No conjunctival injection is noted.  Oropharynx is clear.  Uvula is midline and without swelling.  Mouth revealed no lesions.  Voice is clear and without muffling.  Bilateral nares clear of drainage.  Tympanic membranes are clear.  NECK:  Supple.  Trachea is midline.  No carotid bruits present on auscultation.  LUNGS:  Breathing is easy.  Breath sounds are equal and clear bilaterally.  No wheezes, rhonchi, or rales.  HEART:  Regular rate and rhythm with normal S1 and S2.  No murmurs, gallops, or rubs.  EXTREMITIES: Trace edema to bilateral lower extremities.    MENTAL STATUS:   The patient was alert and oriented to person, place, time and purpose. Registration and recall intact. No difficulty with concentration.   CRANIAL NERVES:  PERRL. EOMI; no nystagmus.  Full visual fields.  Trapezius and sternocleidomastoid are full strength. Tongue was midline and protrudes midline. Uvula was midline and raises midline. Facial sensation was intact to pain and light touch at all distributions. No speech disturbance. Hearing intact to conversation and whisper.  Right facial weakness is present which is chronic per patient.  MOTOR: Strength was 5/5 at upper extremities, lower extremities and trunk.  No drift.  Speed and dexterity was unremarkable. Bulk and tone were unremarkable. There was no evidence of atrophy/atrophy of intrinsic hand muscles or foot muscles.  No abnormal movements or fasciculations were observed.  SENSORY:  Sensation intact to pain and light touch at all distributions.   No neglect.  REFLEXES: There was no clonus present.  Toes down-going.  CEREBELLAR FUNCTIONING: No difficulty  "with finger-to-nose, finger tapping and heel-to-shin tasks. No dysmetria or dysdiadochokinesia observed.  PSYCH:   Anxious mood and affect. Thought content unremarkable.    SKIN:  Warm, dry, and well perfused.  Good turgor.  No lesions, nodules, or rashes are noted.  No bruising noted.      Comment: Discrepancies between my note and notes on behalf of the nursing team or other care providers are secondary to my findings reflecting my physical examination and questioning of the patient.  Any conflicting information provided is not in line with my examination of the patient.       ED Course     ED Course as of 06/18/24 1424   Tue Jun 18, 2024   1122 ECG, chest x-ray, lab work ordered.   1130 In to see patient and history/physical completed.    1233 ECG, lab work, x-ray benign.  Patient continues to have the dizziness and lightheadedness with \"prickling sensation in his head\".  With significant history of mass, did order CT of head and CT angio of head and neck.   1416 Head CT and CT angio head and neck showed no acute abnormality.  At this time no acute life-threatening abnormalities noted.  For this reason we will discharge home.  Will place patient on Zio patch to rule out arrhythmia.  Close follow-up with PCP.            ECG:    ECG competed at 1115 and personally reviewed at 1118 showing sinus rhythm with a ventricular rate of 73 and QTc of 401.  Right axis deviation is present.  Anterior infarct age indeterminant is noted.  T wave abnormality in septal leads present.  Abnormal ECG.  When compared to ECG from 7/11/2022, anterior infarct age-indeterminate is now noted.  Criteria for septal infarct age indeterminant has resolved.  No other significant changes found.         Results for orders placed or performed during the hospital encounter of 06/18/24 (from the past 24 hour(s))   EKG 12-lead, tracing only   Result Value Ref Range    Systolic Blood Pressure  mmHg    Diastolic Blood Pressure  mmHg    Ventricular " Rate 73 BPM    Atrial Rate 73 BPM    WI Interval 178 ms    QRS Duration 86 ms     ms    QTc 401 ms    P Axis 70 degrees    R AXIS 113 degrees    T Axis 55 degrees    Interpretation ECG       Sinus rhythm  Right axis deviation  Anteroseptal infarct , age undetermined  Abnormal ECG  No previous ECGs available  Confirmed by MD Small Anthony ([id number]) on 6/18/2024 11:51:38 AM     CBC with platelets differential    Narrative    The following orders were created for panel order CBC with platelets differential.  Procedure                               Abnormality         Status                     ---------                               -----------         ------                     CBC with platelets and d...[[id number]]                      Final result                 Please view results for these tests on the individual orders.   Comprehensive metabolic panel   Result Value Ref Range    Sodium 138 135 - 145 mmol/L    Potassium 3.9 3.4 - 5.3 mmol/L    Carbon Dioxide (CO2) 22 22 - 29 mmol/L    Anion Gap 11 7 - 15 mmol/L    Urea Nitrogen 16.0 6.0 - 20.0 mg/dL    Creatinine 0.97 0.67 - 1.17 mg/dL    GFR Estimate >90 >60 mL/min/1.73m2    Calcium 8.9 8.6 - 10.0 mg/dL    Chloride 105 98 - 107 mmol/L    Glucose 106 (H) 70 - 99 mg/dL    Alkaline Phosphatase 69 40 - 150 U/L    AST 29 0 - 45 U/L    ALT 27 0 - 70 U/L    Protein Total 7.0 6.4 - 8.3 g/dL    Albumin 4.4 3.5 - 5.2 g/dL    Bilirubin Total 0.7 <=1.2 mg/dL   Troponin T, High Sensitivity   Result Value Ref Range    Troponin T, High Sensitivity <6 <=22 ng/L   Magnesium   Result Value Ref Range    Magnesium 2.1 1.7 - 2.3 mg/dL   CBC with platelets and differential   Result Value Ref Range    WBC Count 6.0 4.0 - 11.0 10e3/uL    RBC Count 4.99 4.40 - 5.90 10e6/uL    Hemoglobin 15.0 13.3 - 17.7 g/dL    Hematocrit 43.1 40.0 - 53.0 %    MCV 86 78 - 100 fL    MCH 30.1 26.5 - 33.0 pg    MCHC 34.8 31.5 - 36.5 g/dL    RDW 13.0 10.0 - 15.0 %    Platelet Count 176 150 - 450  10e3/uL    % Neutrophils 77 %    % Lymphocytes 15 %    % Monocytes 6 %    % Eosinophils 1 %    % Basophils 1 %    % Immature Granulocytes 0 %    NRBCs per 100 WBC 0 <1 /100    Absolute Neutrophils 4.6 1.6 - 8.3 10e3/uL    Absolute Lymphocytes 0.9 0.8 - 5.3 10e3/uL    Absolute Monocytes 0.4 0.0 - 1.3 10e3/uL    Absolute Eosinophils 0.1 0.0 - 0.7 10e3/uL    Absolute Basophils 0.0 0.0 - 0.2 10e3/uL    Absolute Immature Granulocytes 0.0 <=0.4 10e3/uL    Absolute NRBCs 0.0 10e3/uL   Extra Tube    Narrative    The following orders were created for panel order Extra Tube.  Procedure                               Abnormality         Status                     ---------                               -----------         ------                     Extra Blue Top Tube[[id number]]                              Final result               Extra Red Top Tube[[id number]]                               Final result               Extra Heparinized Syringe[[id number]]                        Final result                 Please view results for these tests on the individual orders.   Extra Blue Top Tube   Result Value Ref Range    Hold Specimen JIC    Extra Red Top Tube   Result Value Ref Range    Hold Specimen JIC    Extra Heparinized Syringe   Result Value Ref Range    Hold Specimen JIC    XR Chest 2 Views    Narrative    Procedure:XR CHEST 2 VIEWS    Clinical history:Male, 59 years, Dizziness    Technique: Two views are submitted.    Comparison: 11/21/2023, 12/9/2020    Findings: The cardiac silhouette is within normal limits.. The  pulmonary vasculature is within normal limits.    The lungs are clear. Bony structures are unremarkable.      Impression    Impression:   No acute abnormality. No evidence of acute or active cardiopulmonary  disease.    ERNIE STEVENS MD         SYSTEM ID:  [id number]   CT Head w/o Contrast    Narrative    Exam: CT HEAD W/O CONTRAST    Clinical history:59 years Male Dizziness    Comparisons: 5/31/2024 through  10/19/2017 Technique: Axial CT imaging  of the head was performed Without intervenous contrast.  This exam was performed using one or more of the following dose  reduction techniques:  Automated exposure control, adjustment of the DAPHNE and/or KV according  to patient's size, and/or use of iterative reconstruction technique.    FINDINGS:   Ventricles and sulci are symmetric. The gray-white matter  differentiation throughout the brain is well maintained. There is no  evidence of intracranial mass or hemorrhage. Visualized portions of  the paranasal sinuses and mastoid air cells are well aerated. There is  no evidence of skull fracture. There is old surgical change of the  right temporal bone, unchanged from the prior studies.      Impression    IMPRESSION: Negative Head CT    TANISHA HEARD MD         SYSTEM ID:  [id number]   CTA Head Neck with Contrast    Narrative    CTA HEAD NECK W CONTRAST    HISTORY: 59 years Male Dizziness    COMPARISON: None    TECHNIQUE: Contrast-enhanced CT angiography of the neck and head was  performed with intervenous contrast. Multiplanar reconstructions and  MIP, volume rendered and 3-D MIP reconstructions were obtained on a  3-D workstation.  This exam was performed using one or more of the following dose  reduction techniques:  Automated exposure control, adjustment of the DAPHNE and/or KV according  to patient's size, and/or use of iterative reconstruction technique.    FINDINGS:    Right neck:            Brachiocephalic artery: patent            Common carotid artery:  patent            Internal carotid artery:Widely patent            Vertebral artery:Widely patent                Left neck:            Common carotid artery: Widely patent            Internal carotid artery:Widely patent            Vertebral artery:Widely patent      Head:                      Anterior circulation:The vessels are patent without evidence  of abrupt cut off. There is no evidence of aneurysm.            Posterior circulation:The vessels are patent. There is no  abrupt cut off. There is no evidence of aneurysm      Impression    IMPRESSION:No acute findings. There is no evidence of significant  carotid artery stenosis. There is no evidence of large vessel  occlusion or intracranial aneurysm.    TANISHA HEARD MD         SYSTEM ID:  [id number]       Medications   sodium chloride (PF) 0.9% PF flush 100 mL (100 mLs Intravenous $Given 6/18/24 1334)   iopamidol (ISOVUE-370) solution 67 mL (67 mLs Intravenous $Given 6/18/24 1334)       Assessments & Plan (with Medical Decision Making)     I have reviewed the nursing notes.    I have reviewed the findings, diagnosis, plan and need for follow up with the patient.    Summary:  Patient presents to the ER today as, lightheadedness, nausea, and chest pain.  Potential diagnosis which have been considered and evaluated include BPPV, intracerebral abnormality, central vertigo, MI/NSTEMI, arrhythmia, electrolyte abnormality, sinusitis, intra ear abnormality, as well as others. Many of these have been excluded using the various modalities and assessment as noted on the chart. At the present time, the diagnosis given seems to be the most likely sinus and lightheadedness with chest pain without etiology.  Upon arrival, vitals signs are normal.  The patient is alert and oriented.  Patient is very anxious.  Cardiac and respiratory examination normal.  Patient does have right facial weakness compared to left which is chronic due to history of mass in the brain.  No other acute findings noted.  ECG obtained showing no acute abnormality.  Lab work obtained showing WBC of 6.0 with hemoglobin 15.0.  Electrolytes, renal, hepatic functions normal.  High-sensitivity troponin negative making ACS unlikely.  Chest x-ray personally reviewed showing no acute cardiopulmonary abnormalities.  CT of the head showed no acute intracranial abnormalities.  CT angio of head and neck show no evidence of  significant carotid artery stenosis.  There is no evidence of large vessel or intra cranial aneurysm.  At this time no acute findings or life-threatening findings noted.  Will discharge patient home on Zio patch for 14 days to rule out arrhythmia.  Close follow-up with PCP.  Return to ER if new or worsening symptoms.  Patient verbalized understanding agrees with plan of care.  Patient discharged home.      Critical Care Time: None    Impression and plan discussed with patient. Questions answered, concerns addressed, indications for urgent re-evaluation reviewed, and  given. Patient/Parent/Caregiver agree with treatment plan and have no further questions at this time.  AVS provided at discharge.    This note was created by the Dragon Voice Dictation System. Inadvertent typographical errors, due to software recognition problems, may still exist.             New Prescriptions    No medications on file       Final diagnoses:   Episodic lightheadedness   Chest pain       6/18/2024   [facility]       Robert Elizondo APRN CNP  06/18/24 1427

## 2024-06-18 NOTE — ED NOTES
"\"I am anxious, my daughter is at the [employer] and a friend of mine just killed himself.  My dad had a stroke last year and I have been taking care of him.  There has been a lot of stuff going on in my life/stressors in the last 2 years.\"   "

## 2024-06-18 NOTE — Clinical Note
Rajendra Gagnon was seen and treated in our emergency department on 6/18/2024.  He may return to work on 06/19/2024.       If you have any questions or concerns, please don't hesitate to call.      Robert Elizondo APRN CNP

## 2024-06-18 NOTE — ED TRIAGE NOTES
Patient was evaluated in triage by Urgent Care provider and deemed inappropriate for UC.    Patient to be seen in Emergency Department.    Pt presents from work complaining of intermittent high blood pressure, dizziness, shakiness, and nausea. Pt reports increased stress as of late. Pt states he took 81 mg ASA, zyrtec, and singular. Pt was diagnosed with a sinus infection a couple weeks ago. Pt also complains of intermittent blurred vision. Current sx have been on/off recently for about the last couple of weeks.

## 2024-06-19 ENCOUNTER — ORDERS ONLY (AUTO-RELEASED) (OUTPATIENT)
Dept: EMERGENCY MEDICINE | Facility: HOSPITAL | Age: 59
End: 2024-06-19

## 2024-06-19 ENCOUNTER — TELEPHONE (OUTPATIENT)
Dept: FAMILY MEDICINE | Facility: OTHER | Age: 59
End: 2024-06-19

## 2024-06-19 DIAGNOSIS — R07.9 CHEST PAIN: ICD-10-CM

## 2024-06-19 DIAGNOSIS — R42 EPISODIC LIGHTHEADEDNESS: ICD-10-CM

## 2024-06-19 NOTE — TELEPHONE ENCOUNTER
Symptom or reason needing to speak to RN: er follow up     Best number to return call: [phone]      Best time to return call: anytime asap

## 2024-06-19 NOTE — TELEPHONE ENCOUNTER
Pt called back and was having some dizziness, nausea,  and check discomfort.  Pt reported an elevated blood pressure.  Pt reports some anxiety and fluctuation in his blood pressure.  Pt scheduled with PCP.  Pt encourage to seek medical care at ED with any new or worsening symptoms.  Pt verbalized understanding.

## 2024-06-20 ENCOUNTER — OFFICE VISIT (OUTPATIENT)
Dept: FAMILY MEDICINE | Facility: OTHER | Age: 59
End: 2024-06-20
Attending: FAMILY MEDICINE
Payer: COMMERCIAL

## 2024-06-20 VITALS
RESPIRATION RATE: 18 BRPM | DIASTOLIC BLOOD PRESSURE: 78 MMHG | WEIGHT: 272 LBS | TEMPERATURE: 97 F | SYSTOLIC BLOOD PRESSURE: 132 MMHG | BODY MASS INDEX: 34.91 KG/M2 | OXYGEN SATURATION: 98 % | HEART RATE: 81 BPM | HEIGHT: 74 IN

## 2024-06-20 DIAGNOSIS — M99.00 SOMATIC DYSFUNCTION OF HEAD REGION: ICD-10-CM

## 2024-06-20 DIAGNOSIS — F41.1 GAD (GENERALIZED ANXIETY DISORDER): Primary | ICD-10-CM

## 2024-06-20 PROCEDURE — G2211 COMPLEX E/M VISIT ADD ON: HCPCS | Performed by: FAMILY MEDICINE

## 2024-06-20 PROCEDURE — 99214 OFFICE O/P EST MOD 30 MIN: CPT | Performed by: FAMILY MEDICINE

## 2024-06-20 ASSESSMENT — ANXIETY QUESTIONNAIRES
7. FEELING AFRAID AS IF SOMETHING AWFUL MIGHT HAPPEN: SEVERAL DAYS
IF YOU CHECKED OFF ANY PROBLEMS ON THIS QUESTIONNAIRE, HOW DIFFICULT HAVE THESE PROBLEMS MADE IT FOR YOU TO DO YOUR WORK, TAKE CARE OF THINGS AT HOME, OR GET ALONG WITH OTHER PEOPLE: SOMEWHAT DIFFICULT
5. BEING SO RESTLESS THAT IT IS HARD TO SIT STILL: SEVERAL DAYS
4. TROUBLE RELAXING: MORE THAN HALF THE DAYS
3. WORRYING TOO MUCH ABOUT DIFFERENT THINGS: MORE THAN HALF THE DAYS
7. FEELING AFRAID AS IF SOMETHING AWFUL MIGHT HAPPEN: SEVERAL DAYS
GAD7 TOTAL SCORE: 10
2. NOT BEING ABLE TO STOP OR CONTROL WORRYING: SEVERAL DAYS
GAD7 TOTAL SCORE: 10
6. BECOMING EASILY ANNOYED OR IRRITABLE: SEVERAL DAYS
8. IF YOU CHECKED OFF ANY PROBLEMS, HOW DIFFICULT HAVE THESE MADE IT FOR YOU TO DO YOUR WORK, TAKE CARE OF THINGS AT HOME, OR GET ALONG WITH OTHER PEOPLE?: SOMEWHAT DIFFICULT
1. FEELING NERVOUS, ANXIOUS, OR ON EDGE: MORE THAN HALF THE DAYS
GAD7 TOTAL SCORE: 10

## 2024-06-20 ASSESSMENT — PATIENT HEALTH QUESTIONNAIRE - PHQ9
SUM OF ALL RESPONSES TO PHQ QUESTIONS 1-9: 9
SUM OF ALL RESPONSES TO PHQ QUESTIONS 1-9: 9
10. IF YOU CHECKED OFF ANY PROBLEMS, HOW DIFFICULT HAVE THESE PROBLEMS MADE IT FOR YOU TO DO YOUR WORK, TAKE CARE OF THINGS AT HOME, OR GET ALONG WITH OTHER PEOPLE: SOMEWHAT DIFFICULT

## 2024-06-20 ASSESSMENT — PAIN SCALES - GENERAL: PAINLEVEL: NO PAIN (0)

## 2024-06-20 NOTE — PROGRESS NOTES
"  Assessment & Plan     CLAUDETTE (generalized anxiety disorder) with somatic sx  Long talk on this   Discussed good health habits-diet exercise no stimulants  Discussed R/B of meds- he deferred  Discussed counselors/ therapists- he is interested. Tel #'s given   Discussed controlling what he can control.   Symptomatic treatment was discussed along when patient should call and/or come back into the clinic or go to ER/Urgent care. All questions answered.       Review of external notes as documented elsewhere in note  Diagnosis or treatment significantly limited by social determinants of health - complex- lots os counseling   30 minutes spent by me on the date of the encounter doing chart review, history and exam, documentation and further activities per the note      MED REC REQUIRED  Post Medication Reconciliation Status: discharge medications reconciled, continue medications without change  BMI  Estimated body mass index is 34.92 kg/m  as calculated from the following:    Height as of this encounter: 1.88 m (6' 2\").    Weight as of this encounter: 123.4 kg (272 lb).             No follow-ups on file.    Subjective   Rajendra is a 59 year old, presenting for the following health issues:  Hospital F/U        6/20/2024     1:24 PM   Additional Questions   Roomed by April   Accompanied by self         6/20/2024     1:24 PM   Patient Reported Additional Medications   Patient reports taking the following new medications none     HPI     ED/UC Followup:    Facility:  [facility] – [place], [place]  Date of visit: 6/18/2024  Reason for visit: lightheaded, chest pain  Current Status: anxious       ER note reviewed        Review of Systems  Constitutional, HEENT, cardiovascular, pulmonary, gi and gu systems are negative, except as otherwise noted.      Objective    /78 (BP Location: Right arm, Patient Position: Sitting, Cuff Size: Adult Regular)   Pulse 81   Temp 97  F (36.1  C) (Tympanic)   Resp 18   Ht 1.88 m (6' 2\")   Wt 123.4 kg (272 lb)   SpO2 " 98%   BMI 34.92 kg/m    Body mass index is 34.92 kg/m .  Physical Exam   GENERAL: alert and no distress  NECK: no adenopathy, no asymmetry, masses, or scars  RESP: lungs clear to auscultation - no rales, rhonchi or wheezes  CV: regular rate and rhythm, normal S1 S2, no S3 or S4, no murmur, click or rub, no peripheral edema  ABDOMEN: soft, nontender, no hepatosplenomegaly, no masses and bowel sounds normal  MS: no gross musculoskeletal defects noted, no edema  PSYCH: mentation appears normal, affect normal/bright- anxious/ lots of worry and analyzing.  Shook up from friend suicide             Signed Electronically by: Bhupinder Mckeon MD    Answers submitted by the patient for this visit:  Patient Health Questionnaire (Submitted on 6/20/2024)  If you checked off any problems, how difficult have these problems made it for you to do your work, take care of things at home, or get along with other people?: Somewhat difficult  PHQ9 TOTAL SCORE: 9  CLAUDETTE-7 (Submitted on 6/20/2024)  CLAUDETTE 7 TOTAL SCORE: 10  Hypertension Visit (Submitted on 6/20/2024)  Chief Complaint: Chronic problems general questions HPI Form  Do you check your blood pressure regularly outside of the clinic?: Yes  Are your blood pressures ever more than 140 on the top number (systolic) OR more than 90 on the bottom number (diastolic)? (For example, greater than 140/90): Yes  Are you following a low salt diet?: No  Depression / Anxiety Questionnaire (Submitted on 6/20/2024)  Chief Complaint: Chronic problems general questions HPI Form  Depression/Anxiety: Depression & Anxiety  Depression & Anxiety (Submitted on 6/20/2024)  Chief Complaint: Chronic problems general questions HPI Form  Status since last visit:: better  Anxiety since last: : medium  Other associated symptoms of depression:: Yes  Other associated symotome: : Yes  Significant life event: : grief or loss, health concerns  Anxious:: Yes  Current substance use:: No  General Questionnaire (Submitted  on 6/20/2024)  Chief Complaint: Chronic problems general questions HPI Form  What is the reason for your visit today? : allergy  How many servings of fruits and vegetables do you eat daily?: 2-3  On average, how many sweetened beverages do you drink each day (Examples: soda, juice, sweet tea, etc.  Do NOT count diet or artificially sweetened beverages)?: 2  How many minutes a day do you exercise enough to make your heart beat faster?: 10 to 19  How many days a week do you exercise enough to make your heart beat faster?: 5  How many days per week do you miss taking your medication?: 0

## 2024-06-20 NOTE — PATIENT INSTRUCTIONS
Psychologists/ Counselors                        Sissy Clark          ...            ..[phone]  [facility]                    ..  [phone]  [facility]                 .[phone]  [facility] (kids)       .         [phone]  [facility](teens)                ..[phone]  [facility]                    [phone]  [facility]                   [phone]  [facility]           ...      .. [phone]  [facility]                ...[phone]  [facility]     ...          ...[phone]  [facility]               [phone]   [facility]..            .[phone]  [facility]               .    [phone]  [facility]                ..[phone]  [facility]     .      . ..[phone]  [facility].....................................................................................[phone]  [facility].........................................................................[phone]  [facility].......................................................[phone]     [facility]              .   [phone]  [facility]                   .[phone]              [facility]              .   [phone]  [facility]           ...      .. [phone]  [facility]              . ..[phone]  [facility]              . ..  ..[phone]  [facility]              .     .. [phone]  [facility]                 ... [phone]  [facility]                         .[phone]  [facility]     .      . ..[phone]  [facility]...............................................................[phone]  [facility].....................................................................................[phone]  [facility]................................................[phone]  [facility]..................................................................[phone]  [facility]........................................................[phone]  [facility]..................................................................[phone]  [facility]............................................[phone]     [facility]............................................[phone]     [facility]............................................................................[phone]            [facility]              ....  [phone]                 [facility]                                                                   [phone]  [facility]             . ... ..[phone]  [facility]              .     ..[phone]  [facility]        .      . [phone]  [facility]           .[phone]  [facility]         .    ..[phone]  [employer] [facility]             . ..  ..  [phone]  [facility]            [phone]  [facility]              ..[phone]  [facility]                     [phone]  [facility]........................................................................[phone]  [facility]...........................[phone]  [facility]...................................................................[phone]  [facility].......................................[phone]  [facility]......................................[phone]  [facility]....................................................................................[phone]  [facility]............................................[phone]  [facility]..........................................[phone]  [facility]............................................................................[phone]       [facility]    ...     ...[phone]  [facility]...........................................[phone]     [place]  [facility]...........................................................................[phone]  [facility].......................................................[phone]     [facility]            [phone]                                                  [facility]                ..  .  [phone]  [facility]         .     .  [phone]  [facility] ELIES[facility]              . [phone]  [facility]  ..        [phone]  [facility]              .  ..  [phone]    [facility]           ..   ...  ... [phone]  [facility]             . [phone]  [facility]         ...  [phone]  [facility]     .      . ..[phone]               [facility]....................................................[phone]      VIRTUAL  [facility] (Specializing in LGBTQIA+ care)................[phone]  [facility]..................................................................[phone]        *Facilities in bold italics indicate medication management  Services are offered.    *Many places offer telehealth/ virtual services, some may need initial  intake  Appointment done in person before telehealth can be established.     Crisis support    If you or someone you know is struggling or in mental health crisis, help is available.  Call or text 950 or chat [facility]    The volunteer Crisis Counselor will help you move from a 'hot moment to a cool moment'     FOR HOMELESSNESS OR HOUSING CRISIS CALL 211  **For LOCAL homelessness, food, and other assistance, you can contact:    [facility] of support in [place]: [phone]  [facility]: [phone]  [facility]: [phone] / [phone]  Call 211 for homelessness assistance in the [facility] shelter: [phone]  Housing crisis center: [phone] / [phone] ext [phone]  [facility]: [phone]  [facility]: [phone]  [facility]: [phone] / [phone]  [facility]: [phone]

## 2024-08-20 ENCOUNTER — OFFICE VISIT (OUTPATIENT)
Dept: CHIROPRACTIC MEDICINE | Facility: OTHER | Age: 59
End: 2024-08-20
Payer: COMMERCIAL

## 2024-08-20 DIAGNOSIS — M99.02 SEGMENTAL AND SOMATIC DYSFUNCTION OF THORACIC REGION: ICD-10-CM

## 2024-08-20 DIAGNOSIS — M54.50 ACUTE BILATERAL LOW BACK PAIN WITHOUT SCIATICA: ICD-10-CM

## 2024-08-20 DIAGNOSIS — M99.03 SEGMENTAL AND SOMATIC DYSFUNCTION OF LUMBAR REGION: Primary | ICD-10-CM

## 2024-08-20 PROCEDURE — 98940 CHIROPRACT MANJ 1-2 REGIONS: CPT | Mod: AT | Performed by: CHIROPRACTOR

## 2024-08-22 ENCOUNTER — OFFICE VISIT (OUTPATIENT)
Dept: CHIROPRACTIC MEDICINE | Facility: OTHER | Age: 59
End: 2024-08-22
Attending: CHIROPRACTOR
Payer: COMMERCIAL

## 2024-08-22 DIAGNOSIS — M99.03 SEGMENTAL AND SOMATIC DYSFUNCTION OF LUMBAR REGION: Primary | ICD-10-CM

## 2024-08-22 DIAGNOSIS — M99.02 SEGMENTAL AND SOMATIC DYSFUNCTION OF THORACIC REGION: ICD-10-CM

## 2024-08-22 DIAGNOSIS — M54.50 ACUTE BILATERAL LOW BACK PAIN WITHOUT SCIATICA: ICD-10-CM

## 2024-08-22 PROCEDURE — 98940 CHIROPRACT MANJ 1-2 REGIONS: CPT | Mod: AT | Performed by: CHIROPRACTOR

## 2024-08-27 NOTE — PROGRESS NOTES
Subjective Finding:    Chief compalint: Patient presents with:  Back Pain , Pain Scale: 2/10, Intensity: sharp, Duration: 1 weeks, Radiating: no.    Date of injury:     Activities that the pain restricts:   Home/household/hobbies/social activities: Yes.  Work duties: No.  Sleep: No.  Makes symptoms better: rest.  Makes symptoms worse: lumbar extension.  Have you seen anyone else for the symptoms? No.  Work related: No.  Automobile related injury: No.    Objective and Assessment:    Posture Analysis:   High shoulder: .  Head tilt: .  High iliac crest: .  Head carriage: neutral.  Thoracic Kyphosis: neutral.  Lumbar Lordosis: neutral.    Lumbar Range of Motion: flexion decreased and extension decreased.  Cervical Range of Motion: .  Thoracic Range of Motion: .  Extremity Range of Motion: .    Palpation:   Quad lumb: left, referred pain: yes    Segmental dysfunction pre-treatment and treatment area: T6, L2, and L3.    Assessment post-treatment:  Cervical: ROM increased.  Thoracic: ROM increased.  Lumbar: ROM increased.    Comments: .      Complicating Factors: .    Procedure(s):  CMT:  51543 Chiropractic manipulative treatment 1-2 regions performed   Thoracic: Diversified, See above for level, Prone and Lumbar: Diversified, See above for level, Side posture    Modalities:  None performed this visit    Therapeutic procedures:  None    Plan:  Treatment plan: PRN.  Instructed patient: stretch as instructed at visit.  Short term goals: reduce pain.  Long term goals: increase ADL.  Prognosis: very good.

## 2024-08-29 NOTE — PROGRESS NOTES
Subjective Finding:    Chief compalint: Patient presents with:  Back Pain  , Pain Scale: 1/10, Intensity: sharp, Duration: 1 weeks, Radiating: no.    Date of injury:     Activities that the pain restricts:   Home/household/hobbies/social activities: Yes.  Work duties: No.  Sleep: No.  Makes symptoms better: rest.  Makes symptoms worse: lumbar extension.  Have you seen anyone else for the symptoms? No.  Work related: No.  Automobile related injury: No.    Objective and Assessment:    Posture Analysis:   High shoulder: .  Head tilt: .  High iliac crest: .  Head carriage: neutral.  Thoracic Kyphosis: neutral.  Lumbar Lordosis: neutral.    Lumbar Range of Motion: flexion decreased and extension decreased.  Cervical Range of Motion: .  Thoracic Range of Motion: .  Extremity Range of Motion: .    Palpation:   Quad lumb: left, referred pain: yes    Segmental dysfunction pre-treatment and treatment area: T6, L2, and L3.    Assessment post-treatment:  Cervical: ROM increased.  Thoracic: ROM increased.  Lumbar: ROM increased.    Comments: .      Complicating Factors: .    Procedure(s):  CMT:  88276 Chiropractic manipulative treatment 1-2 regions performed   Thoracic: Diversified, See above for level, Prone and Lumbar: Diversified, See above for level, Side posture    Modalities:  None performed this visit    Therapeutic procedures:  None    Plan:  Treatment plan: PRN.  Instructed patient: stretch as instructed at visit.  Short term goals: reduce pain.  Long term goals: increase ADL.  Prognosis: very good.

## 2024-10-07 ENCOUNTER — LAB (OUTPATIENT)
Dept: LAB | Facility: OTHER | Age: 59
End: 2024-10-07
Payer: COMMERCIAL

## 2024-10-07 DIAGNOSIS — C61 PRIMARY MALIGNANT NEOPLASM OF PROSTATE (H): ICD-10-CM

## 2024-10-07 LAB — PSA SERPL DL<=0.01 NG/ML-MCNC: 0.02 NG/ML (ref 0–3.5)

## 2024-10-07 PROCEDURE — 84153 ASSAY OF PSA TOTAL: CPT

## 2024-10-07 PROCEDURE — 36415 COLL VENOUS BLD VENIPUNCTURE: CPT

## 2024-10-11 ENCOUNTER — TELEPHONE (OUTPATIENT)
Dept: ALLERGY | Facility: OTHER | Age: 59
End: 2024-10-11

## 2024-10-11 DIAGNOSIS — J31.0 PERENNIAL NON-ALLERGIC RHINITIS: Primary | ICD-10-CM

## 2024-10-11 NOTE — TELEPHONE ENCOUNTER
Patient calling requesting a refill of his allergy drops. Patient states he will check to see if his Epipen is up to date when he gets home and give us a call if a refill is needed. The patient has no questions or concerns regarding the drops at this time.

## 2024-11-18 ENCOUNTER — TELEPHONE (OUTPATIENT)
Dept: FAMILY MEDICINE | Facility: OTHER | Age: 59
End: 2024-11-18

## 2024-11-18 DIAGNOSIS — L50.8 CHRONIC URTICARIA: ICD-10-CM

## 2024-11-18 DIAGNOSIS — J30.2 PERENNIAL ALLERGIC RHINITIS WITH SEASONAL VARIATION: ICD-10-CM

## 2024-11-18 DIAGNOSIS — J30.89 PERENNIAL ALLERGIC RHINITIS WITH SEASONAL VARIATION: ICD-10-CM

## 2024-11-18 NOTE — TELEPHONE ENCOUNTER
Singulair      Last Written Prescription Date:  9/1/23  Last Fill Quantity: 90,   # refills: 4  Last Office Visit: 6/20/24  Future Office visit:       Routing refill request to provider for review/approval because:

## 2024-11-18 NOTE — TELEPHONE ENCOUNTER
2:27 PM    Reason for Call: OVERBOOK    Patient is having the following symptoms: left eye puss, blurry vision, eye hurts, sensitivity to light for 4 weeks. Eye drops are not working, allergy medication not working.    The patient is requesting an appointment for Thursday or Friday with Dr. Mckeon.    Was an appointment offered for this call? No  If yes : Appointment type              Date    Preferred method for responding to this message: Telephone Call  What is your phone number ? [phone]    If we cannot reach you directly, may we leave a detailed response at the number you provided? Yes    Can this message wait until your PCP/provider returns, if unavailable today? YES,     Lucy Parker

## 2024-11-19 RX ORDER — MONTELUKAST SODIUM 10 MG/1
1 TABLET ORAL AT BEDTIME
Qty: 90 TABLET | Refills: 1 | Status: SHIPPED | OUTPATIENT
Start: 2024-11-19

## 2024-11-21 ENCOUNTER — OFFICE VISIT (OUTPATIENT)
Dept: FAMILY MEDICINE | Facility: OTHER | Age: 59
End: 2024-11-21
Attending: FAMILY MEDICINE
Payer: COMMERCIAL

## 2024-11-21 VITALS
RESPIRATION RATE: 19 BRPM | DIASTOLIC BLOOD PRESSURE: 78 MMHG | SYSTOLIC BLOOD PRESSURE: 128 MMHG | OXYGEN SATURATION: 97 % | HEIGHT: 74 IN | HEART RATE: 81 BPM | WEIGHT: 281 LBS | BODY MASS INDEX: 36.06 KG/M2 | TEMPERATURE: 97.2 F

## 2024-11-21 DIAGNOSIS — H10.13 ALLERGIC CONJUNCTIVITIS, BILATERAL: Primary | ICD-10-CM

## 2024-11-21 RX ORDER — OLOPATADINE HYDROCHLORIDE 1 MG/ML
1 SOLUTION/ DROPS OPHTHALMIC 2 TIMES DAILY
Qty: 5 ML | Refills: 3 | Status: SHIPPED | OUTPATIENT
Start: 2024-11-21

## 2024-11-21 ASSESSMENT — ANXIETY QUESTIONNAIRES
1. FEELING NERVOUS, ANXIOUS, OR ON EDGE: SEVERAL DAYS
3. WORRYING TOO MUCH ABOUT DIFFERENT THINGS: SEVERAL DAYS
GAD7 TOTAL SCORE: 6
GAD7 TOTAL SCORE: 6
8. IF YOU CHECKED OFF ANY PROBLEMS, HOW DIFFICULT HAVE THESE MADE IT FOR YOU TO DO YOUR WORK, TAKE CARE OF THINGS AT HOME, OR GET ALONG WITH OTHER PEOPLE?: SOMEWHAT DIFFICULT
GAD7 TOTAL SCORE: 6
IF YOU CHECKED OFF ANY PROBLEMS ON THIS QUESTIONNAIRE, HOW DIFFICULT HAVE THESE PROBLEMS MADE IT FOR YOU TO DO YOUR WORK, TAKE CARE OF THINGS AT HOME, OR GET ALONG WITH OTHER PEOPLE: SOMEWHAT DIFFICULT
7. FEELING AFRAID AS IF SOMETHING AWFUL MIGHT HAPPEN: SEVERAL DAYS
6. BECOMING EASILY ANNOYED OR IRRITABLE: NOT AT ALL
5. BEING SO RESTLESS THAT IT IS HARD TO SIT STILL: SEVERAL DAYS
7. FEELING AFRAID AS IF SOMETHING AWFUL MIGHT HAPPEN: SEVERAL DAYS
4. TROUBLE RELAXING: SEVERAL DAYS
2. NOT BEING ABLE TO STOP OR CONTROL WORRYING: SEVERAL DAYS

## 2024-11-21 ASSESSMENT — PATIENT HEALTH QUESTIONNAIRE - PHQ9
SUM OF ALL RESPONSES TO PHQ QUESTIONS 1-9: 7
10. IF YOU CHECKED OFF ANY PROBLEMS, HOW DIFFICULT HAVE THESE PROBLEMS MADE IT FOR YOU TO DO YOUR WORK, TAKE CARE OF THINGS AT HOME, OR GET ALONG WITH OTHER PEOPLE: SOMEWHAT DIFFICULT
SUM OF ALL RESPONSES TO PHQ QUESTIONS 1-9: 7

## 2024-11-21 ASSESSMENT — PAIN SCALES - GENERAL: PAINLEVEL_OUTOF10: NO PAIN (0)

## 2024-11-21 NOTE — PROGRESS NOTES
"  Assessment & Plan     Allergic conjunctivitis, bilateral  Will double his zyrtec and add pantonol  no infection seen.  Symptomatic treatment was discussed along when patient should call and/or come back into the clinic or go to ER/Urgent care. All questions answered.   - olopatadine (PATANOL) 0.1 % ophthalmic solution; Place 1 drop into both eyes 2 times daily.          BMI  Estimated body mass index is 36.08 kg/m  as calculated from the following:    Height as of this encounter: 1.88 m (6' 2\").    Weight as of this encounter: 127.5 kg (281 lb).           No follow-ups on file.    Subjective   Rajendra is a 59 year old, presenting for the following health issues:  Eye Problem        11/21/2024     7:43 AM   Additional Questions   Roomed by ADDY Giron   Accompanied by self`     HPI     Eye(s) Problem  Onset/Duration: 4 WEEKS   Description:   Location: Bilateral  Pain: YES  Redness: YES  Accompanying Signs & Symptoms:  Discharge/mattering: YES  Swelling: YES  Visual changes: YES  Fever: No  Nasal Congestion: YES  Bothered by bright lights: YES  History:  Trauma: No  Foreign body exposure: No  Wearing contacts: No  Precipitating or alleviating factors: None  Therapies tried and outcome: allergies drops helping with allergies  -      LEFT worse than right   Discharge/mattering shut at night  Has h/o allergies  Less sx during the day   Some itch and discharge - worse at end of day   On Zyrtec         Review of Systems  Constitutional, HEENT, cardiovascular, pulmonary, gi and gu systems are negative, except as otherwise noted.      Objective    /78 (BP Location: Left arm, Patient Position: Sitting, Cuff Size: Adult Large)   Pulse 81   Temp 97.2  F (36.2  C) (Tympanic)   Resp 19   Ht 1.88 m (6' 2\")   Wt 127.5 kg (281 lb)   SpO2 97%   BMI 36.08 kg/m    Body mass index is 36.08 kg/m .  Physical Exam   GENERAL: alert and no distress  EYES: Eyes grossly normal to inspection, PERRL and conjunctivae and sclerae " normal  HENT: ear canals and TM's normal, nose and mouth without ulcers or lesions  NECK: no adenopathy, no asymmetry, masses, or scars            Signed Electronically by: Bhupinder Mckeon MD

## 2024-12-24 ENCOUNTER — HOSPITAL ENCOUNTER (EMERGENCY)
Facility: HOSPITAL | Age: 59
Discharge: HOME | End: 2024-12-24
Attending: STUDENT IN AN ORGANIZED HEALTH CARE EDUCATION/TRAINING PROGRAM
Payer: COMMERCIAL

## 2024-12-24 VITALS
HEIGHT: 74 IN | BODY MASS INDEX: 34.79 KG/M2 | OXYGEN SATURATION: 96 % | RESPIRATION RATE: 16 BRPM | DIASTOLIC BLOOD PRESSURE: 90 MMHG | WEIGHT: 271.1 LBS | HEART RATE: 84 BPM | SYSTOLIC BLOOD PRESSURE: 139 MMHG | TEMPERATURE: 96.3 F

## 2024-12-24 DIAGNOSIS — R00.2 PALPITATIONS: ICD-10-CM

## 2024-12-24 DIAGNOSIS — R20.0 PERIORAL NUMBNESS: ICD-10-CM

## 2024-12-24 LAB
ALBUMIN SERPL BCG-MCNC: 4.6 G/DL (ref 3.5–5.2)
ALP SERPL-CCNC: 82 U/L (ref 40–150)
ALT SERPL W P-5'-P-CCNC: 32 U/L (ref 0–70)
ANION GAP SERPL CALCULATED.3IONS-SCNC: 10 MMOL/L (ref 7–15)
AST SERPL W P-5'-P-CCNC: 33 U/L (ref 0–45)
BASOPHILS # BLD AUTO: 0 10E3/UL (ref 0–0.2)
BASOPHILS NFR BLD AUTO: 0 %
BILIRUB SERPL-MCNC: 0.8 MG/DL
BUN SERPL-MCNC: 16.9 MG/DL (ref 6–20)
CALCIUM SERPL-MCNC: 9.3 MG/DL (ref 8.8–10.4)
CHLORIDE SERPL-SCNC: 103 MMOL/L (ref 98–107)
CREAT SERPL-MCNC: 1.15 MG/DL (ref 0.67–1.17)
EGFRCR SERPLBLD CKD-EPI 2021: 74 ML/MIN/1.73M2
EOSINOPHIL # BLD AUTO: 0.2 10E3/UL (ref 0–0.7)
EOSINOPHIL NFR BLD AUTO: 3 %
ERYTHROCYTE [DISTWIDTH] IN BLOOD BY AUTOMATED COUNT: 12.7 % (ref 10–15)
GLUCOSE SERPL-MCNC: 113 MG/DL (ref 70–99)
HCO3 SERPL-SCNC: 25 MMOL/L (ref 22–29)
HCT VFR BLD AUTO: 45.2 % (ref 40–53)
HGB BLD-MCNC: 15.9 G/DL (ref 13.3–17.7)
HOLD SPECIMEN: NORMAL
IMM GRANULOCYTES # BLD: 0 10E3/UL
IMM GRANULOCYTES NFR BLD: 0 %
LYMPHOCYTES # BLD AUTO: 1.4 10E3/UL (ref 0.8–5.3)
LYMPHOCYTES NFR BLD AUTO: 18 %
MAGNESIUM SERPL-MCNC: 2.4 MG/DL (ref 1.7–2.3)
MCH RBC QN AUTO: 30.8 PG (ref 26.5–33)
MCHC RBC AUTO-ENTMCNC: 35.2 G/DL (ref 31.5–36.5)
MCV RBC AUTO: 88 FL (ref 78–100)
MONOCYTES # BLD AUTO: 0.5 10E3/UL (ref 0–1.3)
MONOCYTES NFR BLD AUTO: 7 %
NEUTROPHILS # BLD AUTO: 5.7 10E3/UL (ref 1.6–8.3)
NEUTROPHILS NFR BLD AUTO: 72 %
NRBC # BLD AUTO: 0 10E3/UL
NRBC BLD AUTO-RTO: 0 /100
NT-PROBNP SERPL-MCNC: 82 PG/ML (ref 0–900)
PHOSPHATE SERPL-MCNC: 2.7 MG/DL (ref 2.5–4.5)
PLATELET # BLD AUTO: 182 10E3/UL (ref 150–450)
POTASSIUM SERPL-SCNC: 3.6 MMOL/L (ref 3.4–5.3)
PROT SERPL-MCNC: 7.1 G/DL (ref 6.4–8.3)
RBC # BLD AUTO: 5.16 10E6/UL (ref 4.4–5.9)
SODIUM SERPL-SCNC: 138 MMOL/L (ref 135–145)
TROPONIN T SERPL HS-MCNC: <6 NG/L
WBC # BLD AUTO: 8 10E3/UL (ref 4–11)

## 2024-12-24 PROCEDURE — 99284 EMERGENCY DEPT VISIT MOD MDM: CPT | Mod: 25

## 2024-12-24 PROCEDURE — 82310 ASSAY OF CALCIUM: CPT | Performed by: STUDENT IN AN ORGANIZED HEALTH CARE EDUCATION/TRAINING PROGRAM

## 2024-12-24 PROCEDURE — 83880 ASSAY OF NATRIURETIC PEPTIDE: CPT | Performed by: STUDENT IN AN ORGANIZED HEALTH CARE EDUCATION/TRAINING PROGRAM

## 2024-12-24 PROCEDURE — 96374 THER/PROPH/DIAG INJ IV PUSH: CPT

## 2024-12-24 PROCEDURE — 84443 ASSAY THYROID STIM HORMONE: CPT

## 2024-12-24 PROCEDURE — 93005 ELECTROCARDIOGRAM TRACING: CPT

## 2024-12-24 PROCEDURE — 84155 ASSAY OF PROTEIN SERUM: CPT | Performed by: STUDENT IN AN ORGANIZED HEALTH CARE EDUCATION/TRAINING PROGRAM

## 2024-12-24 PROCEDURE — 36415 COLL VENOUS BLD VENIPUNCTURE: CPT | Performed by: STUDENT IN AN ORGANIZED HEALTH CARE EDUCATION/TRAINING PROGRAM

## 2024-12-24 PROCEDURE — 83735 ASSAY OF MAGNESIUM: CPT | Performed by: STUDENT IN AN ORGANIZED HEALTH CARE EDUCATION/TRAINING PROGRAM

## 2024-12-24 PROCEDURE — 85004 AUTOMATED DIFF WBC COUNT: CPT | Performed by: STUDENT IN AN ORGANIZED HEALTH CARE EDUCATION/TRAINING PROGRAM

## 2024-12-24 PROCEDURE — 85014 HEMATOCRIT: CPT | Performed by: STUDENT IN AN ORGANIZED HEALTH CARE EDUCATION/TRAINING PROGRAM

## 2024-12-24 PROCEDURE — 250N000011 HC RX IP 250 OP 636: Performed by: STUDENT IN AN ORGANIZED HEALTH CARE EDUCATION/TRAINING PROGRAM

## 2024-12-24 PROCEDURE — 84100 ASSAY OF PHOSPHORUS: CPT | Performed by: STUDENT IN AN ORGANIZED HEALTH CARE EDUCATION/TRAINING PROGRAM

## 2024-12-24 PROCEDURE — 99284 EMERGENCY DEPT VISIT MOD MDM: CPT | Performed by: STUDENT IN AN ORGANIZED HEALTH CARE EDUCATION/TRAINING PROGRAM

## 2024-12-24 PROCEDURE — 84484 ASSAY OF TROPONIN QUANT: CPT | Performed by: STUDENT IN AN ORGANIZED HEALTH CARE EDUCATION/TRAINING PROGRAM

## 2024-12-24 PROCEDURE — 93010 ELECTROCARDIOGRAM REPORT: CPT | Performed by: INTERNAL MEDICINE

## 2024-12-24 RX ORDER — ONDANSETRON 4 MG/1
4 TABLET, ORALLY DISINTEGRATING ORAL EVERY 8 HOURS PRN
Qty: 20 TABLET | Refills: 0 | Status: SHIPPED | OUTPATIENT
Start: 2024-12-24

## 2024-12-24 RX ORDER — ONDANSETRON 2 MG/ML
4 INJECTION INTRAMUSCULAR; INTRAVENOUS ONCE
Status: COMPLETED | OUTPATIENT
Start: 2024-12-24 | End: 2024-12-24

## 2024-12-24 RX ADMIN — ONDANSETRON 4 MG: 2 INJECTION INTRAMUSCULAR; INTRAVENOUS at 17:16

## 2024-12-24 ASSESSMENT — COLUMBIA-SUICIDE SEVERITY RATING SCALE - C-SSRS
2. HAVE YOU ACTUALLY HAD ANY THOUGHTS OF KILLING YOURSELF IN THE PAST MONTH?: NO
6. HAVE YOU EVER DONE ANYTHING, STARTED TO DO ANYTHING, OR PREPARED TO DO ANYTHING TO END YOUR LIFE?: NO
1. IN THE PAST MONTH, HAVE YOU WISHED YOU WERE DEAD OR WISHED YOU COULD GO TO SLEEP AND NOT WAKE UP?: NO

## 2024-12-24 ASSESSMENT — ENCOUNTER SYMPTOMS
MUSCULOSKELETAL NEGATIVE: 1
SHORTNESS OF BREATH: 0
PALPITATIONS: 1
FEVER: 0
ABDOMINAL PAIN: 0
NEUROLOGICAL NEGATIVE: 1
PSYCHIATRIC NEGATIVE: 1
COUGH: 0

## 2024-12-24 ASSESSMENT — ACTIVITIES OF DAILY LIVING (ADL)
ADLS_ACUITY_SCORE: 41
ADLS_ACUITY_SCORE: 41

## 2024-12-24 NOTE — ED TRIAGE NOTES
Patient here today for his heart feeling like it is skipping beats and palpitations. Has been treated for A-fib in the past. Does have a Zio patch on. States he also has a little right chin numbness during [employer] this morning and states that it still feels numb. States he has had this numbness before he had his brain surgery. .     Provider to room for neuro eval.      Triage Assessment (Adult)       Row Name 12/24/24 6834          Triage Assessment    Airway WDL WDL        Respiratory WDL    Respiratory WDL WDL        Skin Circulation/Temperature WDL    Skin Circulation/Temperature WDL WDL        Cardiac WDL    Cardiac WDL WDL        Peripheral/Neurovascular WDL    Peripheral Neurovascular WDL X        Cognitive/Neuro/Behavioral WDL    Cognitive/Neuro/Behavioral WDL WDL

## 2024-12-24 NOTE — ED PROVIDER NOTES
History     Chief Complaint   Patient presents with    Irregular Heart Beat    Numbness     HPI  Rajendra Gagnon is a 59 year old male who arrives to the ED via private auto for the chief complaint of palpitations, right sided perioral numbness just prior to arrival, numbness has now resolved.  He does have a history of resection of acoustic neuroma on the right side and does have intermittent facial numbness but he states that this is in a bit of a different area than normal.  All of this started while he was sitting at [employer].  He is worried that he could be having a stroke or be in A-fib which is why he is here.  He has a history of 1 episode of A-fib remotely in which he self converted.  He denies any other cardiac history, is not on blood thinning medications.  He denies slurred speech, facial droop, feeling as though one of his limbs is weaker or heavier than the others, no history of stroke.  Additionally, patient states that he has been doing a new intermittent fasting diet with his wife for the past 5 weeks and has lost 16 pounds.      Allergies:  Allergies   Allergen Reactions    Aspartame Nausea    Venlafaxine Other (See Comments)     Night terrors    Zoloft [Sertraline] Other (See Comments)     Head rush, hives and agitation       Problem List:    Patient Active Problem List    Diagnosis Date Noted    CLAUDETTE (generalized anxiety disorder) 01/17/2023     Priority: Medium    Morbid obesity (H) 01/06/2021     Priority: Medium    Chronic urticaria 01/06/2021     Priority: Medium    Primary malignant neoplasm of prostate (H) 02/14/2019     Priority: Medium     Added automatically from request for surgery [id number]      Adjustment disorder with mixed anxiety and depressed mood 11/14/2017     Priority: Medium    Varicose veins of both lower extremities 05/12/2017     Priority: Medium    ACP (advance care planning) 05/11/2016     Priority: Medium     Advance Care Planning 5/11/2016: ACP Review of Chart /  Resources Provided:  Reviewed chart for advance care plan.  Rajendra Gagnon has no plan or code status on file. Discussed available resources and provided with information.   Added by Juan A Vitale            Umbilical hernia without obstruction and without gangrene 05/11/2016     Priority: Medium    Hypertriglyceridemia      Priority: Medium    History of atrial fibrillation 04/23/2012     Priority: Medium    Dysthymia 03/26/2012     Priority: Medium    Benign neoplasm of cranial nerve (H) 03/14/2012     Priority: Medium     Problem list name updated by automated process. Provider to review      Right acoustic neuroma 03/14/2012     Priority: Medium    Atrial fibrillation (H) 01/01/2012     Priority: Medium        Past Medical History:    Past Medical History:   Diagnosis Date    Dysthymia 3/26/2012    Hearing loss in right ear 3/14/2012    History of atrial fibrillation 4/23/2012    Hypertriglyceridemia     Right acoustic neuroma 3/14/2012    Tinnitus, unspecified 3/14/2012    Umbilical hernia without obstruction and without gangrene 5/11/2016    Varicose veins     Varicose veins of both lower extremities 5/12/2017       Past Surgical History:    Past Surgical History:   Procedure Laterality Date    acustic neuroma removed  03/2007    right    BIOPSY PROSTATE TRANSRECTAL N/A 02/05/2019    Procedure: TRANSRECTAL Ultrasound BIOPSY of PROSTATE;  Surgeon: Filemon Betts MD;  Location: [facility] OR    COLONOSCOPY N/A 01/03/2018    Repeat in 2028//Procedure: COLONOSCOPY;  COLONOSCOPY;  Surgeon: Reggie Ron DO;  Location: [facility] OR    funnel chest[      states had chest reconstruction    HERNIA REPAIR      ORTHOPEDIC SURGERY      right knee surgery    PE TUBES      PROSTATECTOMY         Family History:    Family History   Problem Relation Age of Onset    Diabetes Mother         type 2    Hypertension Mother     Heart Disease Mother     Lipids Mother     Alcohol/Drug Father     Cancer Maternal Grandmother     Eye Disorder  "Maternal Grandfather     Heart Disease Maternal Grandfather     Cancer Maternal Grandfather     Cancer Paternal Grandmother     Prostate Cancer Paternal Grandfather     Fibromyalgia Brother     Other - See Comments Daughter         lupus    Kidney Disease Daughter     Kidney Disease Daughter        Social History:  Marital Status:   [2]  Social History     Tobacco Use    Smoking status: Never     Passive exposure: Never    Smokeless tobacco: Never   Vaping Use    Vaping status: Never Used   Substance Use Topics    Alcohol use: Yes     Alcohol/week: 3.3 standard drinks of alcohol     Types: 4 Cans of beer per week     Comment: rarely    Drug use: No        Medications:    ondansetron (ZOFRAN ODT) 4 MG ODT tab  Ascorbic Acid (VITAMIN C) 500 MG CHEW  ASPIRIN 81 PO  Calcium-Magnesium-Zinc 333-133-5 MG TABS per tablet  cetirizine (ZYRTEC) 10 MG tablet  EPINEPHrine (ANY BX GENERIC EQUIV) 0.3 MG/0.3ML injection 2-pack  fluticasone (FLONASE) 50 MCG/ACT nasal spray  montelukast (SINGULAIR) 10 MG tablet  Multiple Vitamins-Minerals (MULTIVITAMIN OR)  olopatadine (PATANOL) 0.1 % ophthalmic solution  SUBLINGUAL IMMUNOTHERAPY, SLIT,  vitamin B complex with vitamin C (STRESS TAB) tablet  VITAMIN D, CHOLECALCIFEROL, PO  vitamin E 400 units TABS          Review of Systems   Constitutional:  Negative for fever.   Respiratory:  Negative for cough and shortness of breath.    Cardiovascular:  Positive for palpitations. Negative for chest pain.   Gastrointestinal:  Negative for abdominal pain.   Genitourinary: Negative.    Musculoskeletal: Negative.    Skin:  Negative for rash.   Allergic/Immunologic: Negative for immunocompromised state.   Neurological: Negative.    Psychiatric/Behavioral: Negative.     All other systems reviewed and are negative.      Physical Exam   BP: 166/94  Pulse: 98  Temp: (!) 96.3  F (35.7  C)  Resp: 20  Height: 188 cm (6' 2\")  Weight: 123 kg (271 lb 1.6 oz)  SpO2: 96 %      Physical Exam  Vitals and " nursing note reviewed.   Constitutional:       Appearance: Normal appearance. He is not diaphoretic.   HENT:      Head: Normocephalic and atraumatic.      Nose: Nose normal.      Mouth/Throat:      Mouth: Mucous membranes are moist.   Eyes:      Pupils: Pupils are equal, round, and reactive to light.   Cardiovascular:      Rate and Rhythm: Normal rate and regular rhythm.      Heart sounds: Normal heart sounds.   Pulmonary:      Effort: No respiratory distress.      Breath sounds: Normal breath sounds.   Chest:      Chest wall: No tenderness.   Abdominal:      General: Abdomen is flat.      Tenderness: There is no abdominal tenderness.   Musculoskeletal:         General: Normal range of motion.      Cervical back: Normal range of motion. No tenderness.      Thoracic back: No tenderness.      Lumbar back: No tenderness.   Neurological:      General: No focal deficit present.      Mental Status: He is alert and oriented to person, place, and time. Mental status is at baseline.      Cranial Nerves: No cranial nerve deficit.      Sensory: No sensory deficit.      Motor: No weakness.      Coordination: Coordination normal.      Gait: Gait normal.      Comments: NIH of 0   Psychiatric:         Mood and Affect: Mood normal.         Thought Content: Thought content normal.         ED Course                      EKG Interpretation:      Interpreted by Misael Severino DO  Time reviewed: 1653  Symptoms at time of EKG: palpitations   Rhythm: normal sinus   Rate: normal  Axis: R  Ectopy: none  Conduction: normal  ST Segments/ T Waves: No ST-T wave changes  Q Waves: none  Comparison to prior: Unchanged from June '24    Clinical Impression: normal EKG             Results for orders placed or performed during the hospital encounter of 12/24/24 (from the past 24 hours)   EKG 12-lead, tracing only   Result Value Ref Range    Systolic Blood Pressure  mmHg    Diastolic Blood Pressure  mmHg    Ventricular Rate 90 BPM    Atrial Rate 90 BPM     NC Interval 166 ms    QRS Duration 92 ms     ms    QTc 428 ms    P Axis 78 degrees    R AXIS 116 degrees    T Axis 63 degrees    Interpretation ECG       Sinus rhythm with occasional Premature ventricular complexes  Possible Left atrial enlargement  Right axis deviation  Low voltage QRS  Incomplete right bundle branch block  Septal infarct (cited on or before 18-Jun-2024)  Abnormal ECG  When compared with ECG of 18-Jun-2024 11:15,  Premature ventricular complexes are now Present     [place] Draw    Narrative    The following orders were created for panel order [place] Draw.  Procedure                               Abnormality         Status                     ---------                               -----------         ------                     Extra Blue Top Tube[[id number]]                              In process                 Extra Red Top Tube[[id number]]                               In process                 Extra Green Top (Lithium...[[id number]]                      In process                 Extra Purple Top Tube[[id number]]                            In process                   Please view results for these tests on the individual orders.   CBC with platelets differential    Narrative    The following orders were created for panel order CBC with platelets differential.  Procedure                               Abnormality         Status                     ---------                               -----------         ------                     CBC with platelets and d...[[id number]]                      Final result                 Please view results for these tests on the individual orders.   Comprehensive metabolic panel   Result Value Ref Range    Sodium 138 135 - 145 mmol/L    Potassium 3.6 3.4 - 5.3 mmol/L    Carbon Dioxide (CO2) 25 22 - 29 mmol/L    Anion Gap 10 7 - 15 mmol/L    Urea Nitrogen 16.9 6.0 - 20.0 mg/dL    Creatinine 1.15 0.67 - 1.17 mg/dL    GFR Estimate 74 >60 mL/min/1.73m2     Calcium 9.3 8.8 - 10.4 mg/dL    Chloride 103 98 - 107 mmol/L    Glucose 113 (H) 70 - 99 mg/dL    Alkaline Phosphatase 82 40 - 150 U/L    AST 33 0 - 45 U/L    ALT 32 0 - 70 U/L    Protein Total 7.1 6.4 - 8.3 g/dL    Albumin 4.6 3.5 - 5.2 g/dL    Bilirubin Total 0.8 <=1.2 mg/dL   Troponin T, High Sensitivity   Result Value Ref Range    Troponin T, High Sensitivity <6 <=22 ng/L   NT pro BNP   Result Value Ref Range    N terminal Pro BNP Inpatient 82 0 - 900 pg/mL   Magnesium   Result Value Ref Range    Magnesium 2.4 (H) 1.7 - 2.3 mg/dL   Phosphorus   Result Value Ref Range    Phosphorus 2.7 2.5 - 4.5 mg/dL   CBC with platelets and differential   Result Value Ref Range    WBC Count 8.0 4.0 - 11.0 10e3/uL    RBC Count 5.16 4.40 - 5.90 10e6/uL    Hemoglobin 15.9 13.3 - 17.7 g/dL    Hematocrit 45.2 40.0 - 53.0 %    MCV 88 78 - 100 fL    MCH 30.8 26.5 - 33.0 pg    MCHC 35.2 31.5 - 36.5 g/dL    RDW 12.7 10.0 - 15.0 %    Platelet Count 182 150 - 450 10e3/uL    % Neutrophils 72 %    % Lymphocytes 18 %    % Monocytes 7 %    % Eosinophils 3 %    % Basophils 0 %    % Immature Granulocytes 0 %    NRBCs per 100 WBC 0 <1 /100    Absolute Neutrophils 5.7 1.6 - 8.3 10e3/uL    Absolute Lymphocytes 1.4 0.8 - 5.3 10e3/uL    Absolute Monocytes 0.5 0.0 - 1.3 10e3/uL    Absolute Eosinophils 0.2 0.0 - 0.7 10e3/uL    Absolute Basophils 0.0 0.0 - 0.2 10e3/uL    Absolute Immature Granulocytes 0.0 <=0.4 10e3/uL    Absolute NRBCs 0.0 10e3/uL   [place] Draw    Narrative    The following orders were created for panel order [place] Draw.  Procedure                               Abnormality         Status                     ---------                               -----------         ------                     Extra Heparinized Syringe[[id number]]                        In process                   Please view results for these tests on the individual orders.       Medications   ondansetron (ZOFRAN) injection 4 mg (4 mg Intravenous $Given 12/24/24  1716)       Assessments & Plan (with Medical Decision Making)     I have reviewed the nursing notes.    I have reviewed the findings, diagnosis, plan and need for follow up with the patient.          Medical Decision Making  The patient's presentation was of moderate complexity (an acute illness with systemic symptoms).    The patient's evaluation involved:  ordering and/or review of 3+ test(s) in this encounter (see separate area of note for details)    The patient's management necessitated moderate risk (prescription drug management including medications given in the ED).      Lab work within normal limits.  EKG is reassuring, I do think the patient is feeling some PVCs.  He does take magnesium daily, I counseled him to continue this.  He is currently wearing a Zio patch and does have follow-up for this.  No evidence of A-fib.  NIH is 0, perioral numbness did resolve, no other concerning symptoms noted throughout ED stay.  Patient does agree to follow-up with family doctor as necessary.  Stress reduction techniques counseled upon.  Stable for discharge.  New Prescriptions    ONDANSETRON (ZOFRAN ODT) 4 MG ODT TAB    Take 1 tablet (4 mg) by mouth every 8 hours as needed for nausea.       Final diagnoses:   Palpitations   Perioral numbness       12/24/2024   [facility]       Misael Severino DO  12/24/24 1818

## 2024-12-26 ENCOUNTER — TELEPHONE (OUTPATIENT)
Dept: FAMILY MEDICINE | Facility: OTHER | Age: 59
End: 2024-12-26

## 2024-12-26 LAB
ATRIAL RATE - MUSE: 90 BPM
DIASTOLIC BLOOD PRESSURE - MUSE: NORMAL MMHG
INTERPRETATION ECG - MUSE: NORMAL
P AXIS - MUSE: 78 DEGREES
PR INTERVAL - MUSE: 166 MS
QRS DURATION - MUSE: 92 MS
QT - MUSE: 350 MS
QTC - MUSE: 428 MS
R AXIS - MUSE: 116 DEGREES
SYSTOLIC BLOOD PRESSURE - MUSE: NORMAL MMHG
T AXIS - MUSE: 63 DEGREES
VENTRICULAR RATE- MUSE: 90 BPM

## 2024-12-26 NOTE — TELEPHONE ENCOUNTER
Spoke with patient, he was seen in ED on 12/24 for palpitations and numbness of right side, he is still continuing to have palpitations on and off. Woke up with them this morning. Currently feels fine. No new symptoms. Feels he is more anxious worrying about this. Would like more lab work and possibly a referral to the [facility] for further workup.   Would like to see if PCP would squeeze him in today, if not, will schedule with covering provider next week.   Routing to PCP to advise.    Did schedule patient with covering provider Dr. Bray tomorrow, but patient would prefer to see PCP today if able.

## 2024-12-26 NOTE — PROGRESS NOTES
"  Assessment & Plan     Palpitations  Sensation of chest pressure  ED visit 12/24 for acute on chronic palpitations with some facial paresthesias; workup reassuring.  Similar episodes and workup in the past.  No anginal equivalent or red flag symptoms, did have PVC on EKG and may be dealing with frequent PVCs; currently wearing Zio patch.  Was upfront with him that a lot of the symptoms could be related to untreated mood disorder but given recurrent/persistent symptoms we will complete cardiac workup with stress test and echo.  Adding on TSH today as well.  - TSH with free T4 reflex; Future  - Exercise Stress Test - Adult; Future  - Echocardiogram Complete; Future  - Zio patch results pending  - Could consider low-dose beta-blocker pending Zio patch results  - Reviewed S/S that warrant emergent evaluation    Mood disorder (H)  Ongoing issue.  A lot of stress and loss over the past year.  Discussed with PCP last summer.  Side effects from Effexor and sertraline in the past so quite hesitant for medications, open to therapy.  Discussed options.  Discussed somatic symptom presentation associated with underlying anxiety, now dealing with some depressive features as well.  Hopefully completing cardiac workup can provide some reassurance as well.  - Discussed alternative medication options, Lexapro  - Could try propranolol with concomitant palpitations/PVCs  - Discussed psychiatry referral options  - Discussed self coping strategies  - Will follow-up with PCP    MED REC REQUIRED  Post Medication Reconciliation Status:     BMI  Estimated body mass index is 35.28 kg/m  as calculated from the following:    Height as of 12/24/24: 1.88 m (6' 2\").    Weight as of this encounter: 124.6 kg (274 lb 12.8 oz).   Weight management plan: Discussed healthy diet and exercise guidelines    I spent a total of 40 minutes on the day of the visit.   Time spent by me today doing chart review, history and exam, documentation and further " activities per the note    Follow-up in about 1 month with Dr. Mckeon.    Subjective   Rajendra is a 59 year old, presenting for the following health issues:  ER F/U        12/27/2024     1:54 PM   Additional Questions   Roomed by Porfirio Gonzalez   Accompanied by None         12/27/2024     1:54 PM   Patient Reported Additional Medications   Patient reports taking the following new medications None     HPI     ED/UC Followup:  [place]  [facility] [place]   Date of visit: 12/24/2024  Reason for visit: Palpitations; Perioral numbness    Current Status: Patient states that he feels funky still. Reports headache. Patient is currently wearing a ZioPatch and will remove it on 12/29/2024  -ED visit a few days ago for palpitations, some right facial paresthesias  -Was concerned about having a stroke or A-fib  -History of A-fib in the past with spontaneous conversion  -No other focal neurologic deficits  -Full ED workup including EKG, CBC, BNP, electrolytes, CBC, troponin reassuring  -EKG did have a PVC  -Has had similar intermittent episodes in the past  -Previous evaluation for lightheadedness, chest pain  -Symptoms have improved since ED but not resolved  -Ongoing sensation of palpitations  -Currently wearing Zio patch  -Definitely feeling close to normal but still feels a bit off, almost like he is recovering from the flu  -No chest pain or no exertional components  -No headache or dizziness, no shortness of breath or new peripheral edema    Depression and Anxiety   How are you doing with your depression since your last visit? Worsened   How are you doing with your anxiety since your last visit?  Worsened   Are you having other symptoms that might be associated with depression or anxiety? No  Have you had a significant life event? Grief or Loss and OTHER: Father had a stroke, brother got into car accident     Do you have any concerns with your use of alcohol or other drugs? No  -Has been ongoing conversation about likely anxiety, this  "has been rough lately  -Lots of stress in the past year; both good and bad  -Has been dealing with a number of losses, recently father had a stroke  -Definitely acknowledges some anxiety, now getting to a point where this is starting to affect affect as well  -Feels like his \"happiness meter is broken\"  -Overall doing fine, just not as happy as normal  -Holiday should be from time, just did not have the energy that he usually does  -Discussed issue last June with Dr. Mckeon  -Verbal referral to counselor/therapy at the time  -Reports bad reactions from Effexor and Zoloft in the past  -Very hesitant to add medications  -Does recognize that this is starting to affect daily life, acknowledges that it could be contributing to his palpitations/paresthesia symptoms    Social History     Tobacco Use    Smoking status: Never     Passive exposure: Never    Smokeless tobacco: Never   Vaping Use    Vaping status: Never Used   Substance Use Topics    Alcohol use: Yes     Alcohol/week: 3.3 standard drinks of alcohol     Types: 4 Cans of beer per week     Comment: rarely    Drug use: No         6/20/2024     1:19 PM 11/21/2024     7:31 AM 12/27/2024     1:56 PM   PHQ   PHQ-9 Total Score 9 7  5   Q9: Thoughts of better off dead/self-harm past 2 weeks Not at all Not at all Not at all       Patient-reported         4/2/2024     2:59 PM 6/20/2024     1:20 PM 11/21/2024     7:32 AM   CLAUDETTE-7 SCORE   Total Score 4 (minimal anxiety) 10 (moderate anxiety) 6 (mild anxiety)   Total Score 4 10 6        Patient-reported         12/27/2024     1:56 PM   Last PHQ-9   1.  Little interest or pleasure in doing things 1   2.  Feeling down, depressed, or hopeless 1   3.  Trouble falling or staying asleep, or sleeping too much 1   4.  Feeling tired or having little energy 0   5.  Poor appetite or overeating 0   6.  Feeling bad about yourself 0   7.  Trouble concentrating 1   8.  Moving slowly or restless 1   Q9: Thoughts of better off dead/self-harm " past 2 weeks 0   PHQ-9 Total Score 5   Difficulty at work, home, or with people Somewhat difficult         12/27/2024     2:00 PM   CLAUDETTE-7    1. Feeling nervous, anxious, or on edge 2   2. Not being able to stop or control worrying 3   3. Worrying too much about different things 2   4. Trouble relaxing 1   5. Being so restless that it is hard to sit still 1   6. Becoming easily annoyed or irritable 1   7. Feeling afraid, as if something awful might happen 1   CLAUDETTE-7 Total Score 11   If you checked any problems, how difficult have they made it for you to do your work, take care of things at home, or get along with other people? Somewhat difficult       Suicide Assessment Five-step Evaluation and Treatment (SAFE-T)        Review of Systems  Constitutional, HEENT, cardiovascular, pulmonary, gi and gu systems are negative, except as otherwise noted.      Objective    /85 (BP Location: Left arm, Patient Position: Sitting, Cuff Size: Adult Large)   Pulse 80   Temp 98.5  F (36.9  C) (Tympanic)   Resp 16   Wt 124.6 kg (274 lb 12.8 oz)   SpO2 98%   BMI 35.28 kg/m    Body mass index is 35.28 kg/m .  Physical Exam  Vitals reviewed.   Constitutional:       Appearance: Normal appearance.   HENT:      Head: Normocephalic and atraumatic.   Cardiovascular:      Rate and Rhythm: Normal rate and regular rhythm.      Heart sounds: No murmur heard.  Pulmonary:      Effort: Pulmonary effort is normal.      Breath sounds: Normal breath sounds. No stridor. No wheezing, rhonchi or rales.   Musculoskeletal:         General: Normal range of motion.   Skin:     General: Skin is warm and dry.   Neurological:      General: No focal deficit present.      Mental Status: He is alert and oriented to person, place, and time.   Psychiatric:         Mood and Affect: Mood normal.         Behavior: Behavior normal.          Signed Electronically by: Valdemar Bray MD

## 2024-12-26 NOTE — TELEPHONE ENCOUNTER
Symptom or reason needing to speak to RN: [place] [facility] [place] return call: [phone]      Best time to return call: soon

## 2024-12-27 ENCOUNTER — OFFICE VISIT (OUTPATIENT)
Dept: FAMILY MEDICINE | Facility: OTHER | Age: 59
End: 2024-12-27
Attending: STUDENT IN AN ORGANIZED HEALTH CARE EDUCATION/TRAINING PROGRAM
Payer: COMMERCIAL

## 2024-12-27 VITALS
HEART RATE: 80 BPM | SYSTOLIC BLOOD PRESSURE: 132 MMHG | BODY MASS INDEX: 35.28 KG/M2 | TEMPERATURE: 98.5 F | RESPIRATION RATE: 16 BRPM | DIASTOLIC BLOOD PRESSURE: 85 MMHG | OXYGEN SATURATION: 98 % | WEIGHT: 274.8 LBS

## 2024-12-27 DIAGNOSIS — R07.89 SENSATION OF CHEST PRESSURE: ICD-10-CM

## 2024-12-27 DIAGNOSIS — F39 MOOD DISORDER (H): ICD-10-CM

## 2024-12-27 DIAGNOSIS — R00.2 PALPITATIONS: Primary | ICD-10-CM

## 2024-12-27 LAB — TSH SERPL DL<=0.005 MIU/L-ACNC: 1.96 UIU/ML (ref 0.3–4.2)

## 2024-12-27 PROCEDURE — 99215 OFFICE O/P EST HI 40 MIN: CPT | Performed by: STUDENT IN AN ORGANIZED HEALTH CARE EDUCATION/TRAINING PROGRAM

## 2024-12-27 ASSESSMENT — ANXIETY QUESTIONNAIRES
6. BECOMING EASILY ANNOYED OR IRRITABLE: SEVERAL DAYS
GAD7 TOTAL SCORE: 11
IF YOU CHECKED OFF ANY PROBLEMS ON THIS QUESTIONNAIRE, HOW DIFFICULT HAVE THESE PROBLEMS MADE IT FOR YOU TO DO YOUR WORK, TAKE CARE OF THINGS AT HOME, OR GET ALONG WITH OTHER PEOPLE: SOMEWHAT DIFFICULT
7. FEELING AFRAID AS IF SOMETHING AWFUL MIGHT HAPPEN: SEVERAL DAYS
2. NOT BEING ABLE TO STOP OR CONTROL WORRYING: NEARLY EVERY DAY
4. TROUBLE RELAXING: SEVERAL DAYS
1. FEELING NERVOUS, ANXIOUS, OR ON EDGE: MORE THAN HALF THE DAYS
5. BEING SO RESTLESS THAT IT IS HARD TO SIT STILL: SEVERAL DAYS
3. WORRYING TOO MUCH ABOUT DIFFERENT THINGS: MORE THAN HALF THE DAYS
GAD7 TOTAL SCORE: 11

## 2024-12-27 ASSESSMENT — PATIENT HEALTH QUESTIONNAIRE - PHQ9: SUM OF ALL RESPONSES TO PHQ QUESTIONS 1-9: 5

## 2024-12-27 ASSESSMENT — PAIN SCALES - GENERAL: PAINLEVEL_OUTOF10: NO PAIN (1)

## 2024-12-30 ENCOUNTER — TELEPHONE (OUTPATIENT)
Dept: FAMILY MEDICINE | Facility: OTHER | Age: 59
End: 2024-12-30

## 2024-12-30 DIAGNOSIS — R07.89 SENSATION OF CHEST PRESSURE: ICD-10-CM

## 2024-12-30 DIAGNOSIS — R00.2 PALPITATIONS: Primary | ICD-10-CM

## 2024-12-30 NOTE — TELEPHONE ENCOUNTER
12/30: This patient has a plain treadmill order. Due to baseline EKG abnormalities per Dr. Damian this patient is going to need a cardiolite stress.  If agreed please order [id number].  Please call Dr. Damian with any questions.  Thank You.

## 2025-01-02 LAB — CV ZIO PRELIM RESULTS: NORMAL

## 2025-01-06 LAB — CV ZIO PRELIM RESULTS: NORMAL

## 2025-01-07 ENCOUNTER — HOSPITAL ENCOUNTER (OUTPATIENT)
Dept: CARDIOLOGY | Facility: HOSPITAL | Age: 60
Discharge: HOME | End: 2025-01-07
Attending: STUDENT IN AN ORGANIZED HEALTH CARE EDUCATION/TRAINING PROGRAM | Admitting: INTERNAL MEDICINE
Payer: COMMERCIAL

## 2025-01-07 DIAGNOSIS — R07.89 SENSATION OF CHEST PRESSURE: ICD-10-CM

## 2025-01-07 DIAGNOSIS — R00.2 PALPITATIONS: ICD-10-CM

## 2025-01-07 LAB — LVEF ECHO: NORMAL

## 2025-01-07 PROCEDURE — 93306 TTE W/DOPPLER COMPLETE: CPT | Mod: 26 | Performed by: INTERNAL MEDICINE

## 2025-01-07 PROCEDURE — 93306 TTE W/DOPPLER COMPLETE: CPT

## 2025-01-13 ENCOUNTER — HOSPITAL ENCOUNTER (OUTPATIENT)
Dept: NUCLEAR MEDICINE | Facility: HOSPITAL | Age: 60
Setting detail: NUCLEAR MEDICINE
Discharge: HOME | End: 2025-01-13
Attending: STUDENT IN AN ORGANIZED HEALTH CARE EDUCATION/TRAINING PROGRAM
Payer: COMMERCIAL

## 2025-01-13 ENCOUNTER — HOSPITAL ENCOUNTER (OUTPATIENT)
Dept: CARDIOLOGY | Facility: HOSPITAL | Age: 60
Setting detail: NUCLEAR MEDICINE
Discharge: HOME | End: 2025-01-13
Attending: STUDENT IN AN ORGANIZED HEALTH CARE EDUCATION/TRAINING PROGRAM
Payer: COMMERCIAL

## 2025-01-13 DIAGNOSIS — R07.89 SENSATION OF CHEST PRESSURE: ICD-10-CM

## 2025-01-13 DIAGNOSIS — R00.2 PALPITATIONS: ICD-10-CM

## 2025-01-13 LAB
CV BLOOD PRESSURE: 68 MMHG
CV STRESS MAX HR HE: 161
RATE PRESSURE PRODUCT: NORMAL
STRESS ECHO BASELINE DIASTOLIC HE: 86
STRESS ECHO BASELINE HR: 70 BPM
STRESS ECHO BASELINE SYSTOLIC BP: 146
STRESS ECHO CALCULATED PERCENT HR: 99 %
STRESS ECHO LAST STRESS DIASTOLIC BP: 78
STRESS ECHO LAST STRESS SYSTOLIC BP: 192
STRESS ECHO POST ESTIMATED WORKLOAD: 9.8 METS
STRESS ECHO POST EXERCISE DUR MIN: 7 MIN
STRESS ECHO POST EXERCISE DUR SEC: 35 SEC
STRESS ECHO TARGET HR: 163

## 2025-01-13 PROCEDURE — 78452 HT MUSCLE IMAGE SPECT MULT: CPT

## 2025-01-13 PROCEDURE — A9500 TC99M SESTAMIBI: HCPCS | Performed by: RADIOLOGY

## 2025-01-13 PROCEDURE — 258N000003 HC RX IP 258 OP 636: Performed by: INTERNAL MEDICINE

## 2025-01-13 PROCEDURE — 93017 CV STRESS TEST TRACING ONLY: CPT

## 2025-01-13 PROCEDURE — 343N000001 HC RX 343 MED OP 636: Performed by: RADIOLOGY

## 2025-01-13 RX ORDER — SODIUM CHLORIDE 9 MG/ML
INJECTION, SOLUTION INTRAVENOUS ONCE
Status: COMPLETED | OUTPATIENT
Start: 2025-01-13 | End: 2025-01-13

## 2025-01-13 RX ADMIN — Medication 10.6 MILLICURIE: at 07:52

## 2025-01-13 RX ADMIN — Medication 30.8 MILLICURIE: at 09:54

## 2025-01-13 RX ADMIN — SODIUM CHLORIDE: 9 INJECTION, SOLUTION INTRAVENOUS at 09:46

## 2025-02-04 ENCOUNTER — OFFICE VISIT (OUTPATIENT)
Dept: FAMILY MEDICINE | Facility: OTHER | Age: 60
End: 2025-02-04
Attending: STUDENT IN AN ORGANIZED HEALTH CARE EDUCATION/TRAINING PROGRAM
Payer: COMMERCIAL

## 2025-02-04 VITALS
BODY MASS INDEX: 34.02 KG/M2 | WEIGHT: 265 LBS | RESPIRATION RATE: 18 BRPM | TEMPERATURE: 97.5 F | DIASTOLIC BLOOD PRESSURE: 74 MMHG | OXYGEN SATURATION: 98 % | HEART RATE: 86 BPM | SYSTOLIC BLOOD PRESSURE: 122 MMHG

## 2025-02-04 DIAGNOSIS — J30.89 PERENNIAL ALLERGIC RHINITIS WITH SEASONAL VARIATION: ICD-10-CM

## 2025-02-04 DIAGNOSIS — R00.2 PALPITATIONS: Primary | ICD-10-CM

## 2025-02-04 DIAGNOSIS — J30.2 PERENNIAL ALLERGIC RHINITIS WITH SEASONAL VARIATION: ICD-10-CM

## 2025-02-04 DIAGNOSIS — F39 MOOD DISORDER: ICD-10-CM

## 2025-02-04 PROCEDURE — 90673 RIV3 VACCINE NO PRESERV IM: CPT | Performed by: STUDENT IN AN ORGANIZED HEALTH CARE EDUCATION/TRAINING PROGRAM

## 2025-02-04 PROCEDURE — G2211 COMPLEX E/M VISIT ADD ON: HCPCS | Performed by: STUDENT IN AN ORGANIZED HEALTH CARE EDUCATION/TRAINING PROGRAM

## 2025-02-04 PROCEDURE — 91320 SARSCV2 VAC 30MCG TRS-SUC IM: CPT | Performed by: STUDENT IN AN ORGANIZED HEALTH CARE EDUCATION/TRAINING PROGRAM

## 2025-02-04 PROCEDURE — 99214 OFFICE O/P EST MOD 30 MIN: CPT | Mod: 25 | Performed by: STUDENT IN AN ORGANIZED HEALTH CARE EDUCATION/TRAINING PROGRAM

## 2025-02-04 PROCEDURE — 90480 ADMN SARSCOV2 VAC 1/ONLY CMP: CPT | Performed by: STUDENT IN AN ORGANIZED HEALTH CARE EDUCATION/TRAINING PROGRAM

## 2025-02-04 PROCEDURE — 90471 IMMUNIZATION ADMIN: CPT | Performed by: STUDENT IN AN ORGANIZED HEALTH CARE EDUCATION/TRAINING PROGRAM

## 2025-02-04 RX ORDER — EPINEPHRINE 0.3 MG/.3ML
0.3 INJECTION SUBCUTANEOUS PRN
Qty: 2 EACH | Refills: 1 | Status: SHIPPED | OUTPATIENT
Start: 2025-02-04

## 2025-02-04 ASSESSMENT — ANXIETY QUESTIONNAIRES
3. WORRYING TOO MUCH ABOUT DIFFERENT THINGS: MORE THAN HALF THE DAYS
GAD7 TOTAL SCORE: 8
GAD7 TOTAL SCORE: 8
8. IF YOU CHECKED OFF ANY PROBLEMS, HOW DIFFICULT HAVE THESE MADE IT FOR YOU TO DO YOUR WORK, TAKE CARE OF THINGS AT HOME, OR GET ALONG WITH OTHER PEOPLE?: NOT DIFFICULT AT ALL
2. NOT BEING ABLE TO STOP OR CONTROL WORRYING: MORE THAN HALF THE DAYS
4. TROUBLE RELAXING: SEVERAL DAYS
GAD7 TOTAL SCORE: 8
7. FEELING AFRAID AS IF SOMETHING AWFUL MIGHT HAPPEN: MORE THAN HALF THE DAYS
5. BEING SO RESTLESS THAT IT IS HARD TO SIT STILL: NOT AT ALL
IF YOU CHECKED OFF ANY PROBLEMS ON THIS QUESTIONNAIRE, HOW DIFFICULT HAVE THESE PROBLEMS MADE IT FOR YOU TO DO YOUR WORK, TAKE CARE OF THINGS AT HOME, OR GET ALONG WITH OTHER PEOPLE: NOT DIFFICULT AT ALL
1. FEELING NERVOUS, ANXIOUS, OR ON EDGE: SEVERAL DAYS
6. BECOMING EASILY ANNOYED OR IRRITABLE: NOT AT ALL
7. FEELING AFRAID AS IF SOMETHING AWFUL MIGHT HAPPEN: MORE THAN HALF THE DAYS

## 2025-02-04 ASSESSMENT — PATIENT HEALTH QUESTIONNAIRE - PHQ9
SUM OF ALL RESPONSES TO PHQ QUESTIONS 1-9: 8
10. IF YOU CHECKED OFF ANY PROBLEMS, HOW DIFFICULT HAVE THESE PROBLEMS MADE IT FOR YOU TO DO YOUR WORK, TAKE CARE OF THINGS AT HOME, OR GET ALONG WITH OTHER PEOPLE: SOMEWHAT DIFFICULT
SUM OF ALL RESPONSES TO PHQ QUESTIONS 1-9: 8

## 2025-02-04 ASSESSMENT — PAIN SCALES - GENERAL: PAINLEVEL_OUTOF10: NO PAIN (0)

## 2025-02-04 NOTE — PROGRESS NOTES
{PROVIDER CHARTING PREFERENCE:787461}    Subjective   Rajendra is a 59 year old, presenting for the following health issues:  Anxiety and Depression        2/4/2025     2:51 PM   Additional Questions   Roomed by ALDEN Ross CMA   Accompanied by Self         2/4/2025     2:51 PM   Patient Reported Additional Medications   Patient reports taking the following new medications None     HPI     Depression and Anxiety   How are you doing with your depression since your last visit? Improved ***  How are you doing with your anxiety since your last visit?  Improved ***  Are you having other symptoms that might be associated with depression or anxiety? Not sure  Have you had a significant life event? No   Do you have any concerns with your use of alcohol or other drugs? No    Social History     Tobacco Use    Smoking status: Never     Passive exposure: Never    Smokeless tobacco: Never   Vaping Use    Vaping status: Never Used   Substance Use Topics    Alcohol use: Yes     Alcohol/week: 3.3 standard drinks of alcohol     Types: 4 Cans of beer per week     Comment: rarely    Drug use: No         6/20/2024     1:19 PM 11/21/2024     7:31 AM 12/27/2024     1:56 PM   PHQ   PHQ-9 Total Score 9 7  5   Q9: Thoughts of better off dead/self-harm past 2 weeks Not at all Not at all Not at all       Patient-reported         6/20/2024     1:20 PM 11/21/2024     7:32 AM 12/27/2024     2:00 PM   CLAUDETTE-7 SCORE   Total Score 10 (moderate anxiety) 6 (mild anxiety)    Total Score 10 6  11       Patient-reported     {Last PHQ9 or GAD7 Responses (Optional):697779}    Suicide Assessment Five-step Evaluation and Treatment (SAFE-T)  {Provider  Link to Depression Care Package SmartSet :424393}      {additonal problems for provider to add (Optional):567569}    {ROS Picklists (Optional):009598}      Objective    Wt 120.2 kg (265 lb)   BMI 34.02 kg/m    Body mass index is 34.02 kg/m .  Physical Exam   {Exam List (Optional):554651}    {Diagnostic Test  Results (Optional):233092}        Signed Electronically by: Valdemar Bray MD  {Email feedback regarding this note to [email or web address]   :904021}   reassuring cardiac workup has been helpful  -Feeling stable now    Social History     Tobacco Use    Smoking status: Never     Passive exposure: Never    Smokeless tobacco: Never   Vaping Use    Vaping status: Never Used   Substance Use Topics    Alcohol use: Yes     Alcohol/week: 3.3 standard drinks of alcohol     Types: 4 Cans of beer per week     Comment: rarely    Drug use: No         6/20/2024     1:19 PM 11/21/2024     7:31 AM 12/27/2024     1:56 PM   PHQ   PHQ-9 Total Score 9 7  5   Q9: Thoughts of better off dead/self-harm past 2 weeks Not at all Not at all Not at all       Patient-reported         6/20/2024     1:20 PM 11/21/2024     7:32 AM 12/27/2024     2:00 PM   CLAUDETTE-7 SCORE   Total Score 10 (moderate anxiety) 6 (mild anxiety)    Total Score 10 6  11       Patient-reported         Review of Systems  Constitutional, HEENT, cardiovascular, pulmonary, gi and gu systems are negative, except as otherwise noted.      Objective    Wt 120.2 kg (265 lb)   BMI 34.02 kg/m    Body mass index is 34.02 kg/m .  Physical Exam  Vitals reviewed.   Constitutional:       Appearance: Normal appearance.   HENT:      Head: Normocephalic and atraumatic.   Cardiovascular:      Rate and Rhythm: Normal rate and regular rhythm.      Heart sounds: No murmur heard.  Pulmonary:      Effort: Pulmonary effort is normal.      Breath sounds: Normal breath sounds. No stridor. No wheezing, rhonchi or rales.   Musculoskeletal:         General: Normal range of motion.   Skin:     General: Skin is warm and dry.   Neurological:      General: No focal deficit present.      Mental Status: He is alert and oriented to person, place, and time.   Psychiatric:         Mood and Affect: Mood normal.         Behavior: Behavior normal.        Signed Electronically by: Valdemar Bray MD

## 2025-04-08 ENCOUNTER — LAB (OUTPATIENT)
Dept: LAB | Facility: OTHER | Age: 60
End: 2025-04-08
Attending: STUDENT IN AN ORGANIZED HEALTH CARE EDUCATION/TRAINING PROGRAM
Payer: COMMERCIAL

## 2025-04-08 ENCOUNTER — TELEPHONE (OUTPATIENT)
Dept: FAMILY MEDICINE | Facility: OTHER | Age: 60
End: 2025-04-08

## 2025-04-08 ENCOUNTER — OFFICE VISIT (OUTPATIENT)
Dept: FAMILY MEDICINE | Facility: OTHER | Age: 60
End: 2025-04-08
Attending: STUDENT IN AN ORGANIZED HEALTH CARE EDUCATION/TRAINING PROGRAM
Payer: COMMERCIAL

## 2025-04-08 VITALS
OXYGEN SATURATION: 95 % | WEIGHT: 267.2 LBS | TEMPERATURE: 97.5 F | RESPIRATION RATE: 18 BRPM | BODY MASS INDEX: 34.29 KG/M2 | DIASTOLIC BLOOD PRESSURE: 84 MMHG | SYSTOLIC BLOOD PRESSURE: 132 MMHG | HEART RATE: 75 BPM | HEIGHT: 74 IN

## 2025-04-08 DIAGNOSIS — Z00.00 HEALTHCARE MAINTENANCE: ICD-10-CM

## 2025-04-08 DIAGNOSIS — F43.23 ADJUSTMENT DISORDER WITH MIXED ANXIETY AND DEPRESSED MOOD: Chronic | ICD-10-CM

## 2025-04-08 DIAGNOSIS — Z86.39 HISTORY OF ELEVATED GLUCOSE: ICD-10-CM

## 2025-04-08 DIAGNOSIS — E66.01 MORBID OBESITY (H): ICD-10-CM

## 2025-04-08 DIAGNOSIS — Z13.1 SCREENING FOR DIABETES MELLITUS: ICD-10-CM

## 2025-04-08 DIAGNOSIS — Z00.00 ROUTINE GENERAL MEDICAL EXAMINATION AT A HEALTH CARE FACILITY: Primary | ICD-10-CM

## 2025-04-08 DIAGNOSIS — Z13.220 SCREENING FOR HYPERLIPIDEMIA: ICD-10-CM

## 2025-04-08 DIAGNOSIS — C61 PRIMARY MALIGNANT NEOPLASM OF PROSTATE (H): ICD-10-CM

## 2025-04-08 DIAGNOSIS — Z00.00 ROUTINE GENERAL MEDICAL EXAMINATION AT A HEALTH CARE FACILITY: ICD-10-CM

## 2025-04-08 DIAGNOSIS — E78.1 HYPERTRIGLYCERIDEMIA: Chronic | ICD-10-CM

## 2025-04-08 DIAGNOSIS — Z12.5 SCREENING FOR MALIGNANT NEOPLASM OF PROSTATE: ICD-10-CM

## 2025-04-08 DIAGNOSIS — Z23 NEED FOR VACCINATION: ICD-10-CM

## 2025-04-08 LAB
ALBUMIN SERPL BCG-MCNC: 4.6 G/DL (ref 3.5–5.2)
ALP SERPL-CCNC: 71 U/L (ref 40–150)
ALT SERPL W P-5'-P-CCNC: 28 U/L (ref 0–70)
ANION GAP SERPL CALCULATED.3IONS-SCNC: 6 MMOL/L (ref 7–15)
AST SERPL W P-5'-P-CCNC: 27 U/L (ref 0–45)
BASOPHILS # BLD AUTO: 0 10E3/UL (ref 0–0.2)
BASOPHILS NFR BLD AUTO: 0 %
BILIRUB SERPL-MCNC: 0.9 MG/DL
BUN SERPL-MCNC: 13.8 MG/DL (ref 6–20)
CALCIUM SERPL-MCNC: 9.1 MG/DL (ref 8.8–10.4)
CHLORIDE SERPL-SCNC: 103 MMOL/L (ref 98–107)
CHOLEST SERPL-MCNC: 212 MG/DL
CREAT SERPL-MCNC: 1.01 MG/DL (ref 0.67–1.17)
EGFRCR SERPLBLD CKD-EPI 2021: 87 ML/MIN/1.73M2
EOSINOPHIL # BLD AUTO: 0.2 10E3/UL (ref 0–0.7)
EOSINOPHIL NFR BLD AUTO: 2 %
ERYTHROCYTE [DISTWIDTH] IN BLOOD BY AUTOMATED COUNT: 13 % (ref 10–15)
EST. AVERAGE GLUCOSE BLD GHB EST-MCNC: 105 MG/DL
FASTING STATUS PATIENT QL REPORTED: YES
FASTING STATUS PATIENT QL REPORTED: YES
GLUCOSE SERPL-MCNC: 94 MG/DL (ref 70–99)
HBA1C MFR BLD: 5.3 %
HCO3 SERPL-SCNC: 29 MMOL/L (ref 22–29)
HCT VFR BLD AUTO: 44 % (ref 40–53)
HDLC SERPL-MCNC: 36 MG/DL
HGB BLD-MCNC: 15.2 G/DL (ref 13.3–17.7)
IMM GRANULOCYTES # BLD: 0 10E3/UL
IMM GRANULOCYTES NFR BLD: 0 %
LDLC SERPL CALC-MCNC: 151 MG/DL
LYMPHOCYTES # BLD AUTO: 1.6 10E3/UL (ref 0.8–5.3)
LYMPHOCYTES NFR BLD AUTO: 23 %
MCH RBC QN AUTO: 30.1 PG (ref 26.5–33)
MCHC RBC AUTO-ENTMCNC: 34.5 G/DL (ref 31.5–36.5)
MCV RBC AUTO: 87 FL (ref 78–100)
MONOCYTES # BLD AUTO: 0.6 10E3/UL (ref 0–1.3)
MONOCYTES NFR BLD AUTO: 8 %
NEUTROPHILS # BLD AUTO: 4.7 10E3/UL (ref 1.6–8.3)
NEUTROPHILS NFR BLD AUTO: 67 %
NONHDLC SERPL-MCNC: 176 MG/DL
NRBC # BLD AUTO: 0 10E3/UL
NRBC BLD AUTO-RTO: 0 /100
PLATELET # BLD AUTO: 189 10E3/UL (ref 150–450)
POTASSIUM SERPL-SCNC: 4.1 MMOL/L (ref 3.4–5.3)
PROT SERPL-MCNC: 7.3 G/DL (ref 6.4–8.3)
PSA SERPL DL<=0.01 NG/ML-MCNC: <0.01 NG/ML (ref 0–3.5)
RBC # BLD AUTO: 5.05 10E6/UL (ref 4.4–5.9)
SODIUM SERPL-SCNC: 138 MMOL/L (ref 135–145)
TRIGL SERPL-MCNC: 124 MG/DL
WBC # BLD AUTO: 7.1 10E3/UL (ref 4–11)

## 2025-04-08 PROCEDURE — 83036 HEMOGLOBIN GLYCOSYLATED A1C: CPT

## 2025-04-08 PROCEDURE — 80053 COMPREHEN METABOLIC PANEL: CPT

## 2025-04-08 PROCEDURE — 85025 COMPLETE CBC W/AUTO DIFF WBC: CPT

## 2025-04-08 PROCEDURE — 80061 LIPID PANEL: CPT

## 2025-04-08 PROCEDURE — 36415 COLL VENOUS BLD VENIPUNCTURE: CPT

## 2025-04-08 PROCEDURE — 82306 VITAMIN D 25 HYDROXY: CPT

## 2025-04-08 PROCEDURE — G0103 PSA SCREENING: HCPCS

## 2025-04-08 SDOH — HEALTH STABILITY: PHYSICAL HEALTH: ON AVERAGE, HOW MANY DAYS PER WEEK DO YOU ENGAGE IN MODERATE TO STRENUOUS EXERCISE (LIKE A BRISK WALK)?: 5 DAYS

## 2025-04-08 SDOH — HEALTH STABILITY: PHYSICAL HEALTH: ON AVERAGE, HOW MANY MINUTES DO YOU ENGAGE IN EXERCISE AT THIS LEVEL?: 60 MIN

## 2025-04-08 ASSESSMENT — SOCIAL DETERMINANTS OF HEALTH (SDOH): HOW OFTEN DO YOU GET TOGETHER WITH FRIENDS OR RELATIVES?: MORE THAN THREE TIMES A WEEK

## 2025-04-08 ASSESSMENT — PATIENT HEALTH QUESTIONNAIRE - PHQ9: SUM OF ALL RESPONSES TO PHQ QUESTIONS 1-9: 7

## 2025-04-08 ASSESSMENT — PAIN SCALES - GENERAL: PAINLEVEL_OUTOF10: MILD PAIN (1)

## 2025-04-08 NOTE — PROGRESS NOTES
Preventive Care Visit  [facility]  Valdemar Bray MD, Family Medicine  Apr 8, 2025      Assessment & Plan     Routine general medical examination at a health care facility  - CBC with platelets and differential; Future  - Comprehensive metabolic panel (BMP + Alb, Alk Phos, ALT, AST, Total. Bili, TP); Future  - Hemoglobin A1c; Future  - Lipid Profile (Chol, Trig, HDL, LDL calc); Future  - Vitamin D Deficiency; Future  - PSA, screen; Future    Healthcare maintenance  - BP/vitals: Reviewed  - BMI: Body mass index is 34.31 kg/m .; discussed healthy diet and excise recommendations  - ASCVD risk screening: Annual A1c/lipid screen.  Discussed risk mitigation including indications for ASA/statin therapy.   The ASCVD Risk score (Marnie NELSON, et al., 2019) failed to calculate for the following reasons:    Risk score cannot be calculated because patient has a medical history suggesting prior/existing ASCVD  - Mood: Denies concerns.      10/11/2022     2:00 PM 9/28/2022     2:57 PM   PHQ-2 ( 1999 Pf[employer])   Q1: Little interest or pleasure in doing things 0 1    Q2: Feeling down, depressed or hopeless 0 1    PHQ-2 Score 0 2   Q1: Little interest or pleasure in doing things  Several days   Q2: Feeling down, depressed or hopeless  Several days   PHQ-2 Score  2       Proxy-reported   - Tobacco/substance screening: No tobacco illicit substance use     Tobacco Use      Smoking status: Never        Passive exposure: Never      Smokeless tobacco: Never  - Infectious disease screening: Low risk  - Cancer screening:              -Family history:   -Lung: n/a   -Colon: Colonoscopy 1/3/2018; 10-year follow-up   -Prostate: Annual PSA screen today  - Immunizations: Due for PCV, zoster, hep B    History of elevated glucose  Screening for diabetes mellitus  - Hemoglobin A1c; Future    Screening for hyperlipidemia  Hypertriglyceridemia  Big increase today from previous baseline.  Discussed dietary/lifestyle management.  Will recheck in 6  months.  Really wants to avoid medication.  - Lipid Profile (Chol, Trig, HDL, LDL calc); Future    Morbid obesity (H)  BMI 35.31.  Discussed target weight, optimizing lifestyle management.    Adjustment disorder with mixed anxiety and depressed mood  Long discussion last visit, doing pretty well lately, still a number of ongoing life stressors but seems to be coping pretty well.  Will reach out if he would like to pursue counseling or pharmacotherapy on the road.  - Vitamin D Deficiency; Future    Primary malignant neoplasm of prostate (H)  Screening for malignant neoplasm of prostate  - PSA, screen; Future    Need for vaccination  Return in about 1 month for hep B.  - Pneumococcal 20 Valent Conjugate (Prevnar 20)  - ZOSTER RECOMBINANT ADJUVANTED (SHINGRIX)      Patient has been advised of split billing requirements and indicates understanding: Yes        Counseling  Appropriate preventive services were addressed with this patient via screening, questionnaire, or discussion as appropriate for fall prevention, nutrition, physical activity, Tobacco-use cessation, social engagement, weight loss and cognition.  Checklist reviewing preventive services available has been given to the patient.  Reviewed patient's diet, addressing concerns and/or questions.   The patient's PHQ-9 score is consistent with mild depression. He was provided with information regarding depression.     The longitudinal plan of care for the diagnosis(es)/condition(s) as documented were addressed during this visit. Due to the added complexity in care, I will continue to support Rajendra in the subsequent management and with ongoing continuity of care.    Follow-up 6 months hyperlipidemia.  Return in about 53 weeks (around 4/14/2026) for Annual Wellness Visit.    Subjective   Rajendra is a 59 year old, presenting for the following:  Physical (Annual Visit)        4/8/2025     1:16 PM   Additional Questions   Roomed by Joby Lane LPN   Accompanied by Self          4/8/2025     1:16 PM   Patient Reported Additional Medications   Patient reports taking the following new medications None         HPI     Depression and Anxiety   How are you doing with your depression since your last visit? Improved   How are you doing with your anxiety since your last visit?  Improved   Are you having other symptoms that might be associated with depression or anxiety? No  Have you had a significant life event? Grief or Loss and OTHER: Father had a stroke, he is taking care of his father.    Do you have any concerns with your use of alcohol or other drugs? No    Social History     Tobacco Use    Smoking status: Never     Passive exposure: Never    Smokeless tobacco: Never   Vaping Use    Vaping status: Never Used   Substance Use Topics    Alcohol use: Yes     Alcohol/week: 3.3 standard drinks of alcohol     Types: 4 Cans of beer per week     Comment: rarely    Drug use: No         12/27/2024     1:56 PM 2/4/2025     3:02 PM 4/8/2025     1:31 PM   PHQ   PHQ-9 Total Score 5 8  7   Q9: Thoughts of better off dead/self-harm past 2 weeks Not at all Not at all Not at all       Patient-reported         11/21/2024     7:32 AM 12/27/2024     2:00 PM 2/4/2025     3:00 PM   CLAUDETTE-7 SCORE   Total Score 6 (mild anxiety)  8 (mild anxiety)   Total Score 6  11 8        Patient-reported         4/8/2025     1:31 PM   Last PHQ-9   1.  Little interest or pleasure in doing things 1   2.  Feeling down, depressed, or hopeless 1   3.  Trouble falling or staying asleep, or sleeping too much 0   4.  Feeling tired or having little energy 2   5.  Poor appetite or overeating 2   6.  Feeling bad about yourself 1   7.  Trouble concentrating 0   8.  Moving slowly or restless 0   Q9: Thoughts of better off dead/self-harm past 2 weeks 0   PHQ-9 Total Score 7   Difficulty at work, home, or with people Not difficult at all         2/4/2025     3:00 PM   CLAUDETTE-7    1. Feeling nervous, anxious, or on edge 1   2. Not being able to  stop or control worrying 2   3. Worrying too much about different things 2   4. Trouble relaxing 1   5. Being so restless that it is hard to sit still 0   6. Becoming easily annoyed or irritable 0   7. Feeling afraid, as if something awful might happen 2   CLAUDETTE-7 Total Score 8    If you checked any problems, how difficult have they made it for you to do your work, take care of things at home, or get along with other people? Not difficult at all       Patient-reported       Suicide Assessment Five-step Evaluation and Treatment (SAFE-T)      Advance Care Planning  Patient does not have a Health Care Directive: Discussed advance care planning with patient; information given to patient to review.      4/8/2025   General Health   How would you rate your overall physical health? Good   Feel stress (tense, anxious, or unable to sleep) Only a little   (!) STRESS CONCERN      4/8/2025   Nutrition   Three or more servings of calcium each day? Yes   Diet: Other   If other, please elaborate: Fasting diet   How many servings of fruit and vegetables per day? (!) 2-3   How many sweetened beverages each day? 0-1         4/8/2025   Exercise   Days per week of moderate/strenous exercise 5 days   Average minutes spent exercising at this level 60 min         4/8/2025   Social Factors   Frequency of gathering with friends or relatives More than three times a week   Worry food won't last until get money to buy more No   Food not last or not have enough money for food? No   Do you have housing? (Housing is defined as stable permanent housing and does not include staying ouside in a car, in a tent, in an abandoned building, in an overnight shelter, or couch-surfing.) Yes   Are you worried about losing your housing? No   Lack of transportation? No   Unable to get utilities (heat,electricity)? No         4/8/2025   Fall Risk   Fallen 2 or more times in the past year? No    Trouble with walking or balance? Yes        Proxy-reported            2025   Dental   Dentist two times every year? Yes           2024   TB Screening   Were you born outside of the [place]? No         Today's PHQ-9 Score:       2025     1:31 PM   PHQ-9 SCORE   PHQ-9 Total Score 7         2025   Substance Use   Alcohol more than 3/day or more than 7/wk No   Do you use any other substances recreationally? No     Social History     Tobacco Use    Smoking status: Never     Passive exposure: Never    Smokeless tobacco: Never   Vaping Use    Vaping status: Never Used   Substance Use Topics    Alcohol use: Yes     Alcohol/week: 3.3 standard drinks of alcohol     Types: 4 Cans of beer per week     Comment: rarely    Drug use: No             2025   One time HIV Screening   Previous HIV test? Yes         2025   STI Screening   New sexual partner(s) since last STI/HIV test? No   Last PSA:   PSA   Date Value Ref Range Status   2021 <0.01 0 - 4 ug/L Final     Comment:     Assay Method:  Chemiluminescence using Siemens Vista analyzer     PSA Tumor Marker   Date Value Ref Range Status   10/07/2024 0.02 0.00 - 3.50 ng/mL Final   2022 <0.01 0.00 - 4.00 ug/L Final     ASCVD Risk   The ASCVD Risk score (Marnie NELSON, et al., 2019) failed to calculate for the following reasons:    Risk score cannot be calculated because patient has a medical history suggesting prior/existing ASCVD    Fracture Risk Assessment Tool  Link to Frax Calculator  Use the information below to complete the Frax calculator  : 1965  Sex: male  Weight (kg): 121.2 kg (actual weight)  Height (cm): 188 cm  Previous Fragility Fracture:  No  History of parent with fractured hip:  No  Current Smoking:  No  Patient has been on glucocorticoids for more than 3 months (5mg/day or more): No  Rheumatoid Arthritis on Problem List:  No  Secondary Osteoporosis on Problem List:  No  Consumes 3 or more units of alcohol per day: No  Femoral Neck BMD (g/cm2)           Reviewed and updated as needed this visit  "by Provider                    Lab work is in process  Labs reviewed in EPIC      Review of Systems  Constitutional, HEENT, cardiovascular, pulmonary, gi and gu systems are negative, except as otherwise noted.     Objective    Exam  /84   Pulse 75   Temp 97.5  F (36.4  C) (Tympanic)   Resp 18   Ht 1.88 m (6' 2\")   Wt 121.2 kg (267 lb 3.2 oz)   SpO2 95%   BMI 34.31 kg/m     Estimated body mass index is 34.31 kg/m  as calculated from the following:    Height as of this encounter: 1.88 m (6' 2\").    Weight as of this encounter: 121.2 kg (267 lb 3.2 oz).    Physical Exam  GENERAL: alert and no distress  EYES: Eyes grossly normal to inspection, PERRL and conjunctivae and sclerae normal  HENT: ear canals and TM's normal, nose and mouth without ulcers or lesions  NECK: no adenopathy, no asymmetry, masses, or scars  RESP: lungs clear to auscultation - no rales, rhonchi or wheezes  CV: regular rate and rhythm, normal S1 S2, no S3 or S4, no murmur, click or rub, no peripheral edema  ABDOMEN: soft, nontender, no hepatosplenomegaly, no masses and bowel sounds normal  MS: no gross musculoskeletal defects noted, no edema  SKIN: no suspicious lesions or rashes  NEURO: Normal strength and tone, mentation intact and speech normal  PSYCH: mentation appears normal, affect normal/bright        Signed Electronically by: Valdemar Bray MD    "

## 2025-04-08 NOTE — PATIENT INSTRUCTIONS
Patient Education   Preventive Care Advice   This is general advice given by our system to help you stay healthy. However, your care team may have specific advice just for you. Please talk to your care team about your preventive care needs.  Nutrition  Eat 5 or more servings of fruits and vegetables each day.  Try wheat bread, brown rice and whole grain pasta (instead of white bread, rice, and pasta).  Get enough calcium and vitamin D. Check the label on foods and aim for 100% of the RDA (recommended daily allowance).  Lifestyle  Exercise at least 150 minutes each week  (30 minutes a day, 5 days a week).  Do muscle strengthening activities 2 days a week. These help control your weight and prevent disease.  No smoking.  Wear sunscreen to prevent skin cancer.  Have a dental exam and cleaning every 6 months.  Yearly exams  See your health care team every year to talk about:  Any changes in your health.  Any medicines your care team has prescribed.  Preventive care, family planning, and ways to prevent chronic diseases.  Shots (vaccines)   HPV shots (up to age 26), if you've never had them before.  Hepatitis B shots (up to age 59), if you've never had them before.  COVID-19 shot: Get this shot when it's due.  Flu shot: Get a flu shot every year.  Tetanus shot: Get a tetanus shot every 10 years.  Pneumococcal, hepatitis A, and RSV shots: Ask your care team if you need these based on your risk.  Shingles shot (for age 50 and up)  General health tests  Diabetes screening:  Starting at age 35, Get screened for diabetes at least every 3 years.  If you are younger than age 35, ask your care team if you should be screened for diabetes.  Cholesterol test: At age 39, start having a cholesterol test every 5 years, or more often if advised.  Bone density scan (DEXA): At age 50, ask your care team if you should have this scan for osteoporosis (brittle bones).  Hepatitis C: Get tested at least once in your life.  STIs (sexually  transmitted infections)  Before age 24: Ask your care team if you should be screened for STIs.  After age 24: Get screened for STIs if you're at risk. You are at risk for STIs (including HIV) if:  You are sexually active with more than one person.  You don't use condoms every time.  You or a partner was diagnosed with a sexually transmitted infection.  If you are at risk for HIV, ask about PrEP medicine to prevent HIV.  Get tested for HIV at least once in your life, whether you are at risk for HIV or not.  Cancer screening tests  Cervical cancer screening: If you have a cervix, begin getting regular cervical cancer screening tests starting at age 21.  Breast cancer scan (mammogram): If you've ever had breasts, begin having regular mammograms starting at age 40. This is a scan to check for breast cancer.  Colon cancer screening: It is important to start screening for colon cancer at age 45.  Have a colonoscopy test every 10 years (or more often if you're at risk) Or, ask your provider about stool tests like a FIT test every year or Cologuard test every 3 years.  To learn more about your testing options, visit:   .  For help making a decision, visit:   https://bit.ly/xh47394.  Prostate cancer screening test: If you have a prostate, ask your care team if a prostate cancer screening test (PSA) at age 55 is right for you.  Lung cancer screening: If you are a current or former smoker ages 50 to 80, ask your care team if ongoing lung cancer screenings are right for you.  For informational purposes only. Not to replace the advice of your health care provider. Copyright   2023 [employer]. All rights reserved. Clinically reviewed by the [facility]itions Program. Docitt 311982 - REV 01/24.

## 2025-04-08 NOTE — TELEPHONE ENCOUNTER
8:38 AM    Reason for Call: Phone Call / FYI - Lab orders not on file     Description: Patient was going to reschedule his appointment for today due to his Father breaking his arm. When patient found out the next available wasn't until September he decided to take his original appointments back for today. This staff rescheduled both his Physical and Fasting Labs for today around the same time for labs.   This staff noticed that there is not Active Requests on File for labs from Dr. Bray despite the appointments already being scheduled. Will need to add for labs at 1pm today.    No need to call patient, staff reaching out only to notify care team.    Was an appointment offered for this call? Yes  If yes : Appointment type : Physical and Fasting Labs              Date : 4/8/25          Can this message wait until your PCP/provider returns, if available today? Not applicable    Lucy Parker

## 2025-04-09 LAB — VIT D+METAB SERPL-MCNC: 38 NG/ML (ref 20–50)

## 2025-05-08 ENCOUNTER — ALLIED HEALTH/NURSE VISIT (OUTPATIENT)
Dept: FAMILY MEDICINE | Facility: OTHER | Age: 60
End: 2025-05-08
Attending: STUDENT IN AN ORGANIZED HEALTH CARE EDUCATION/TRAINING PROGRAM
Payer: COMMERCIAL

## 2025-05-08 VITALS — TEMPERATURE: 97 F

## 2025-05-08 DIAGNOSIS — Z23 NEED FOR VACCINATION: Primary | ICD-10-CM

## 2025-06-09 ENCOUNTER — ALLIED HEALTH/NURSE VISIT (OUTPATIENT)
Dept: FAMILY MEDICINE | Facility: OTHER | Age: 60
End: 2025-06-09
Attending: STUDENT IN AN ORGANIZED HEALTH CARE EDUCATION/TRAINING PROGRAM
Payer: COMMERCIAL

## 2025-06-09 VITALS — TEMPERATURE: 97.1 F

## 2025-06-09 DIAGNOSIS — Z23 ENCOUNTER FOR IMMUNIZATION: Primary | ICD-10-CM

## 2025-06-09 NOTE — PROGRESS NOTES
Prior to immunization administration, verified patients identity using patient s name and date of birth. Please see Immunization Activity for additional information.     Is the patient's temperature normal (100.5 or less)? Yes     Patient MEETS CRITERIA. PROCEED with vaccine administration.          6/9/2025   Zoster   Have you had a serious reaction to the shingles vaccine or something in the shingles vaccine? No   Do you have shingles now? No   Are you getting treatment for cancer, organ transplant, or bone marrow transplant? No   Do you have an autoimmune or inflammatory condition? No   Is the patient immunocompromised and wanting to complete the Shingles vaccine series in less than 2 months? No   Have you had Guillain-Austin syndrome within 6 weeks of getting a vaccine? No         Patient MEETS CRITERIA. PROCEED with vaccine administration.      Patient instructed to remain in clinic for 15 minutes afterwards, and to report any adverse reactions.      Link to Ancillary Visit Immunization Standing Orders SmartSet     Screening performed by Blanca Boone LPN on 6/9/2025 at 3:53 PM.

## (undated) DEVICE — PAD CHUX UNDERPAD 30X36" P3036C

## (undated) DEVICE — DRSG GAUZE 4X4" 3033

## (undated) DEVICE — TRANSRECTAL NEEDLE GUIDE DISPOSABLE

## (undated) DEVICE — NDL BX 18GAX25CM MC1825

## (undated) DEVICE — IRRIGATION-H2O 1000ML

## (undated) DEVICE — PAD PERI INDIV WRAP 11" 2022A

## (undated) DEVICE — CANISTER-SUCTION 2000CC

## (undated) DEVICE — SOL WATER 1500ML

## (undated) DEVICE — TUBING-SUCTION 20FT

## (undated) DEVICE — LUBRICATING JELLY 2.7GM T00137

## (undated) DEVICE — LABEL YELLOW TIME OUT CUSTOM SBA15TOFHB

## (undated) RX ORDER — LIDOCAINE HYDROCHLORIDE 20 MG/ML
INJECTION, SOLUTION EPIDURAL; INFILTRATION; INTRACAUDAL; PERINEURAL
Status: DISPENSED
Start: 2019-02-05

## (undated) RX ORDER — GENTAMICIN SULFATE 60 MG/50ML
INJECTION, SOLUTION INTRAVENOUS
Status: DISPENSED
Start: 2019-02-05

## (undated) RX ORDER — PROPOFOL 10 MG/ML
INJECTION, EMULSION INTRAVENOUS
Status: DISPENSED
Start: 2019-02-05

## (undated) RX ORDER — LIDOCAINE HYDROCHLORIDE 10 MG/ML
INJECTION, SOLUTION EPIDURAL; INFILTRATION; INTRACAUDAL; PERINEURAL
Status: DISPENSED
Start: 2019-02-05

## (undated) RX ORDER — PROPOFOL 10 MG/ML
INJECTION, EMULSION INTRAVENOUS
Status: DISPENSED
Start: 2018-01-03

## (undated) RX ORDER — LIDOCAINE HYDROCHLORIDE 20 MG/ML
INJECTION, SOLUTION EPIDURAL; INFILTRATION; INTRACAUDAL; PERINEURAL
Status: DISPENSED
Start: 2018-01-03